# Patient Record
Sex: MALE | Race: WHITE | Employment: FULL TIME | URBAN - METROPOLITAN AREA
[De-identification: names, ages, dates, MRNs, and addresses within clinical notes are randomized per-mention and may not be internally consistent; named-entity substitution may affect disease eponyms.]

---

## 2019-02-04 ENCOUNTER — PATIENT OUTREACH (OUTPATIENT)
Dept: FAMILY MEDICINE CLINIC | Facility: CLINIC | Age: 49
End: 2019-02-04

## 2019-02-04 DIAGNOSIS — Z12.11 SCREEN FOR COLON CANCER: Primary | ICD-10-CM

## 2019-02-04 NOTE — PROGRESS NOTES
Please call pt to inquire if pt has had a colonoscopy in the last 10 years and if so who performed it (name and phone #). Please let [facility] know so I can obtain the report.     If pt did not have a colonoscopy please advise them we will leave the FIT stoo

## 2019-09-09 ENCOUNTER — TELEPHONE (OUTPATIENT)
Dept: FAMILY MEDICINE CLINIC | Facility: CLINIC | Age: 49
End: 2019-09-09

## 2019-12-01 ENCOUNTER — HOSPITAL ENCOUNTER (EMERGENCY)
Facility: HOSPITAL | Age: 49
Discharge: HOME | End: 2019-12-01
Attending: EMERGENCY MEDICINE
Payer: COMMERCIAL

## 2019-12-01 ENCOUNTER — APPOINTMENT (OUTPATIENT)
Dept: GENERAL RADIOLOGY | Facility: HOSPITAL | Age: 49
End: 2019-12-01
Payer: COMMERCIAL

## 2019-12-01 VITALS
WEIGHT: 152 LBS | TEMPERATURE: 98 F | DIASTOLIC BLOOD PRESSURE: 82 MMHG | SYSTOLIC BLOOD PRESSURE: 133 MMHG | RESPIRATION RATE: 16 BRPM | BODY MASS INDEX: 23.86 KG/M2 | HEIGHT: 67 IN | OXYGEN SATURATION: 96 % | HEART RATE: 63 BPM

## 2019-12-01 DIAGNOSIS — R07.9 CHEST PAIN WITH LOW RISK FOR CARDIAC ETIOLOGY: Primary | ICD-10-CM

## 2019-12-01 PROCEDURE — 93010 ELECTROCARDIOGRAM REPORT: CPT

## 2019-12-01 PROCEDURE — 85025 COMPLETE CBC W/AUTO DIFF WBC: CPT

## 2019-12-01 PROCEDURE — 80053 COMPREHEN METABOLIC PANEL: CPT

## 2019-12-01 PROCEDURE — 84484 ASSAY OF TROPONIN QUANT: CPT

## 2019-12-01 PROCEDURE — 80053 COMPREHEN METABOLIC PANEL: CPT | Performed by: EMERGENCY MEDICINE

## 2019-12-01 PROCEDURE — 36415 COLL VENOUS BLD VENIPUNCTURE: CPT

## 2019-12-01 PROCEDURE — 93005 ELECTROCARDIOGRAM TRACING: CPT

## 2019-12-01 PROCEDURE — 71046 X-RAY EXAM CHEST 2 VIEWS: CPT

## 2019-12-01 PROCEDURE — 99285 EMERGENCY DEPT VISIT HI MDM: CPT

## 2019-12-01 PROCEDURE — 85025 COMPLETE CBC W/AUTO DIFF WBC: CPT | Performed by: EMERGENCY MEDICINE

## 2019-12-01 PROCEDURE — 99284 EMERGENCY DEPT VISIT MOD MDM: CPT

## 2019-12-01 PROCEDURE — 84484 ASSAY OF TROPONIN QUANT: CPT | Performed by: EMERGENCY MEDICINE

## 2019-12-01 RX ORDER — CHOLECALCIFEROL (VITAMIN D3) 125 MCG
CAPSULE ORAL
COMMUNITY

## 2019-12-02 ENCOUNTER — TELEPHONE (OUTPATIENT)
Dept: FAMILY MEDICINE CLINIC | Facility: CLINIC | Age: 49
End: 2019-12-02

## 2019-12-02 NOTE — ED INITIAL ASSESSMENT (HPI)
A/O x 4. Patient presents with chest pain and dizziness that began Wednesday. Patient describes the pain as midsternal chest burning. Does not radiate. Denies any nausea or shortness of breath. States that the pain at this point feels like a tightness.

## 2019-12-02 NOTE — TELEPHONE ENCOUNTER
Patient was seen at ER yesterday. Was told to call office to have Dr. Rachelle Chu order a Stress Test.  Verified Phone number on file. Patient asking for a phone call when in system. Please advise. Thank you.

## 2019-12-02 NOTE — ED PROVIDER NOTES
Patient Seen in: [facility] Emergency Department      History   Patient presents with:  Chest Pain Angina (cardiovascular)  Dizziness (neurologic)    Stated Complaint: Chest pain, dizziness    HPI  20-year-old male no significant past medical histor dizziness  Other systems are as noted in HPI. Constitutional and vital signs reviewed. All other systems reviewed and negative except as noted above.     Physical Exam     ED Triage Vitals [12/01/19 2038]   /89   Pulse 75   Resp 18   Temp 98.1 ° ------                     CBC W/ DIFFERENTIAL[[id number]]                              Final result                 Please view results for these tests on the individual orders.    RAINBOW DRAW BLUE   RAINBOW DRAW LAVENDER   RAINBOW DRAW LIGHT GREEN improved condition.               Disposition and Plan     Clinical Impression:  Chest pain with low risk for cardiac etiology  (primary encounter diagnosis)    Disposition:  Discharge  12/1/2019 11:32 pm    Follow-up:  Janay Hines

## 2019-12-03 ENCOUNTER — OFFICE VISIT (OUTPATIENT)
Dept: FAMILY MEDICINE CLINIC | Facility: CLINIC | Age: 49
End: 2019-12-03

## 2019-12-03 VITALS
TEMPERATURE: 98 F | BODY MASS INDEX: 24.82 KG/M2 | DIASTOLIC BLOOD PRESSURE: 80 MMHG | OXYGEN SATURATION: 96 % | HEIGHT: 67.5 IN | HEART RATE: 68 BPM | WEIGHT: 160 LBS | RESPIRATION RATE: 16 BRPM | SYSTOLIC BLOOD PRESSURE: 122 MMHG

## 2019-12-03 DIAGNOSIS — R07.2 SUBSTERNAL CHEST PAIN: Primary | ICD-10-CM

## 2019-12-03 DIAGNOSIS — Z23 NEED FOR VACCINATION: ICD-10-CM

## 2019-12-03 PROCEDURE — 99203 OFFICE O/P NEW LOW 30 MIN: CPT | Performed by: FAMILY MEDICINE

## 2019-12-03 PROCEDURE — 90471 IMMUNIZATION ADMIN: CPT | Performed by: FAMILY MEDICINE

## 2019-12-03 PROCEDURE — 90686 IIV4 VACC NO PRSV 0.5 ML IM: CPT | Performed by: FAMILY MEDICINE

## 2019-12-03 NOTE — PROGRESS NOTES
[address] [facility] Family Medicine Office Note  Chief Complaint:   Patient presents with:  Referral: stress test  Imm/Inj: flu vaccine      HPI:   This is a 44year old male coming in for ER follow-up for chest pressure.   Patient was having chest pressur Brother    • Lipids Brother      Allergies:    Seasonal                  Current Meds:  Current Outpatient Medications   Medication Sig Dispense Refill   • Lactobacillus (PROBIOTIC ACIDOPHILUS) Oral Cap Take by mouth.      • Multiple Vitamins-Minerals (MULT CN 2 - 12 grossly intact     ASSESSMENT AND PLAN:   1. Substernal chest pain  -  ER workup negative  -  Symptoms resolved at this point  -  Check stress test  -  Check TSH and lipid panel  -  Recommend heart scan  -  Further recs once testing and labs are

## 2019-12-10 ENCOUNTER — HOSPITAL ENCOUNTER (OUTPATIENT)
Dept: CV DIAGNOSTICS | Facility: HOSPITAL | Age: 49
Discharge: HOME | End: 2019-12-10
Attending: FAMILY MEDICINE
Payer: COMMERCIAL

## 2019-12-10 DIAGNOSIS — R07.2 SUBSTERNAL CHEST PAIN: ICD-10-CM

## 2019-12-10 PROCEDURE — 93018 CV STRESS TEST I&R ONLY: CPT | Performed by: FAMILY MEDICINE

## 2019-12-10 PROCEDURE — 93017 CV STRESS TEST TRACING ONLY: CPT | Performed by: FAMILY MEDICINE

## 2020-01-11 ENCOUNTER — APPOINTMENT (OUTPATIENT)
Dept: LAB | Facility: HOSPITAL | Age: 50
End: 2020-01-11
Attending: FAMILY MEDICINE
Payer: COMMERCIAL

## 2020-01-11 DIAGNOSIS — R07.2 SUBSTERNAL CHEST PAIN: ICD-10-CM

## 2020-01-11 LAB
CHOLEST SMN-MCNC: 203 MG/DL (ref ?–200)
HDLC SERPL-MCNC: 103 MG/DL (ref 40–59)
LDLC SERPL CALC-MCNC: 93 MG/DL (ref ?–100)
NONHDLC SERPL-MCNC: 100 MG/DL (ref ?–130)
PATIENT FASTING Y/N/NP: YES
TRIGL SERPL-MCNC: 36 MG/DL (ref 30–149)
TSI SER-ACNC: 1.35 MIU/ML (ref 0.36–3.74)
VLDLC SERPL CALC-MCNC: 7 MG/DL (ref 0–30)

## 2020-01-11 PROCEDURE — 36415 COLL VENOUS BLD VENIPUNCTURE: CPT

## 2020-01-11 PROCEDURE — 80061 LIPID PANEL: CPT

## 2020-01-11 PROCEDURE — 84443 ASSAY THYROID STIM HORMONE: CPT

## 2020-02-02 ENCOUNTER — TELEPHONE (OUTPATIENT)
Dept: FAMILY MEDICINE CLINIC | Facility: CLINIC | Age: 50
End: 2020-02-02

## 2020-02-02 NOTE — TELEPHONE ENCOUNTER
Please call patient to ask him to schedule physical with Dr. Shannan Chan.   He is due for colorectal cancer screening and should discuss this with Dr. Shannan Chan at physical.

## 2020-02-10 NOTE — TELEPHONE ENCOUNTER
Left voicemail for patient to call back to schedule this appointment. Sent unable to reach letter via [employer].

## 2020-11-06 ENCOUNTER — MED REC SCAN ONLY (OUTPATIENT)
Dept: FAMILY MEDICINE CLINIC | Facility: CLINIC | Age: 50
End: 2020-11-06

## 2020-12-07 ENCOUNTER — PATIENT OUTREACH (OUTPATIENT)
Dept: FAMILY MEDICINE CLINIC | Facility: CLINIC | Age: 50
End: 2020-12-07

## 2020-12-07 NOTE — PROGRESS NOTES
Please call patient to ask him to schedule physical with Dr. Michelle Rosales.   He is due for colorectal cancer screening and should discuss this with Dr. Michelle Rosales at physical.

## 2021-08-17 ENCOUNTER — TELEPHONE (OUTPATIENT)
Dept: FAMILY MEDICINE CLINIC | Facility: CLINIC | Age: 51
End: 2021-08-17

## 2021-10-28 ENCOUNTER — OFFICE VISIT (OUTPATIENT)
Dept: FAMILY MEDICINE CLINIC | Facility: CLINIC | Age: 51
End: 2021-10-28

## 2021-10-28 VITALS
WEIGHT: 160 LBS | RESPIRATION RATE: 16 BRPM | TEMPERATURE: 98 F | SYSTOLIC BLOOD PRESSURE: 122 MMHG | DIASTOLIC BLOOD PRESSURE: 68 MMHG | HEIGHT: 65.25 IN | BODY MASS INDEX: 26.34 KG/M2 | HEART RATE: 68 BPM

## 2021-10-28 DIAGNOSIS — Z12.11 COLON CANCER SCREENING: ICD-10-CM

## 2021-10-28 DIAGNOSIS — Z12.83 SKIN CANCER SCREENING: ICD-10-CM

## 2021-10-28 DIAGNOSIS — Z00.00 LABORATORY EXAMINATION ORDERED AS PART OF A ROUTINE GENERAL MEDICAL EXAMINATION: Primary | ICD-10-CM

## 2021-10-28 PROCEDURE — 3008F BODY MASS INDEX DOCD: CPT | Performed by: FAMILY MEDICINE

## 2021-10-28 PROCEDURE — 3074F SYST BP LT 130 MM HG: CPT | Performed by: FAMILY MEDICINE

## 2021-10-28 PROCEDURE — 3078F DIAST BP <80 MM HG: CPT | Performed by: FAMILY MEDICINE

## 2021-10-28 PROCEDURE — G0438 PPPS, INITIAL VISIT: HCPCS | Performed by: FAMILY MEDICINE

## 2021-10-28 PROCEDURE — 99396 PREV VISIT EST AGE 40-64: CPT | Performed by: FAMILY MEDICINE

## 2021-10-28 RX ORDER — B-COMPLEX WITH VITAMIN C
TABLET ORAL
COMMUNITY
Start: 2020-06-01

## 2021-10-28 RX ORDER — VITAMIN B COMPLEX
TABLET ORAL
COMMUNITY
Start: 2020-06-01

## 2021-10-28 NOTE — PROGRESS NOTES
Anu Flores is a 46year old male who presents for a complete physical exam.   HPI:   Pt complains of nothing today. States he is doing well. Denies any recent illnesses or hospitalizations. Tdap up to date.   Denies any family history of prostat palsy   • Heart Disorder Maternal Grandmother    • Hypertension Maternal Grandmother    • Cancer Maternal Grandfather         bone cancer   • Other (Other[other]) Paternal Grandmother         emphysema   • Hypertension Brother    • Lipids Brother       Soc murmur  GI: good BS's,no masses, HSM or tenderness  :  two descended testes,no masses,no hernia,no penile lesions  RECTAL: good rectal tone, prostate shows no masses, stool is OB negative  MUSCULOSKELETAL: back is not tender,FROM of the back  EXTREMITIES

## 2021-11-06 ENCOUNTER — LAB ENCOUNTER (OUTPATIENT)
Dept: LAB | Facility: HOSPITAL | Age: 51
End: 2021-11-06
Attending: FAMILY MEDICINE
Payer: COMMERCIAL

## 2021-11-06 DIAGNOSIS — Z00.00 LABORATORY EXAMINATION ORDERED AS PART OF A ROUTINE GENERAL MEDICAL EXAMINATION: ICD-10-CM

## 2021-11-06 PROCEDURE — 36415 COLL VENOUS BLD VENIPUNCTURE: CPT

## 2021-11-06 PROCEDURE — 81003 URINALYSIS AUTO W/O SCOPE: CPT

## 2021-11-06 PROCEDURE — 80053 COMPREHEN METABOLIC PANEL: CPT

## 2021-11-06 PROCEDURE — 80061 LIPID PANEL: CPT

## 2021-11-06 PROCEDURE — 85025 COMPLETE CBC W/AUTO DIFF WBC: CPT

## 2021-11-06 PROCEDURE — 84443 ASSAY THYROID STIM HORMONE: CPT

## 2021-11-08 DIAGNOSIS — R73.09 ELEVATED GLUCOSE: ICD-10-CM

## 2021-11-08 DIAGNOSIS — E78.5 HYPERLIPIDEMIA, UNSPECIFIED HYPERLIPIDEMIA TYPE: Primary | ICD-10-CM

## 2022-01-28 ENCOUNTER — LAB ENCOUNTER (OUTPATIENT)
Dept: LAB | Facility: HOSPITAL | Age: 52
End: 2022-01-28
Attending: INTERNAL MEDICINE
Payer: COMMERCIAL

## 2022-01-28 DIAGNOSIS — Z01.818 PRE-OP TESTING: ICD-10-CM

## 2022-01-28 LAB — SARS-COV-2 RNA RESP QL NAA+PROBE: NOT DETECTED

## 2022-01-31 PROBLEM — Z12.11 SPECIAL SCREENING FOR MALIGNANT NEOPLASM OF COLON: Status: ACTIVE | Noted: 2022-01-31

## 2022-01-31 PROBLEM — D12.0 BENIGN NEOPLASM OF CECUM: Status: ACTIVE | Noted: 2022-01-31

## 2022-01-31 PROBLEM — Z83.719 FAMILY HISTORY OF COLONIC POLYPS: Status: ACTIVE | Noted: 2022-01-31

## 2022-01-31 PROBLEM — D12.2 BENIGN NEOPLASM OF ASCENDING COLON: Status: ACTIVE | Noted: 2022-01-31

## 2022-01-31 PROBLEM — Z83.71 FAMILY HISTORY OF COLONIC POLYPS: Status: ACTIVE | Noted: 2022-01-31

## 2022-01-31 PROCEDURE — 88305 TISSUE EXAM BY PATHOLOGIST: CPT | Performed by: INTERNAL MEDICINE

## 2022-05-14 ENCOUNTER — LAB ENCOUNTER (OUTPATIENT)
Dept: LAB | Facility: HOSPITAL | Age: 52
End: 2022-05-14
Attending: FAMILY MEDICINE
Payer: COMMERCIAL

## 2022-05-14 DIAGNOSIS — R73.09 ELEVATED GLUCOSE: ICD-10-CM

## 2022-05-14 DIAGNOSIS — E78.5 HYPERLIPIDEMIA, UNSPECIFIED HYPERLIPIDEMIA TYPE: ICD-10-CM

## 2022-05-14 LAB
ALBUMIN SERPL-MCNC: 3.9 G/DL (ref 3.4–5)
ALBUMIN/GLOB SERPL: 1.2 {RATIO} (ref 1–2)
ALP LIVER SERPL-CCNC: 74 U/L
ALT SERPL-CCNC: 37 U/L
ANION GAP SERPL CALC-SCNC: 6 MMOL/L (ref 0–18)
AST SERPL-CCNC: 31 U/L (ref 15–37)
BILIRUB SERPL-MCNC: 0.6 MG/DL (ref 0.1–2)
BUN BLD-MCNC: 24 MG/DL (ref 7–18)
CALCIUM BLD-MCNC: 9.4 MG/DL (ref 8.5–10.1)
CHLORIDE SERPL-SCNC: 103 MMOL/L (ref 98–112)
CHOLEST SERPL-MCNC: 193 MG/DL (ref ?–200)
CO2 SERPL-SCNC: 27 MMOL/L (ref 21–32)
CREAT BLD-MCNC: 1.2 MG/DL
EST. AVERAGE GLUCOSE BLD GHB EST-MCNC: 120 MG/DL (ref 68–126)
FASTING PATIENT LIPID ANSWER: YES
FASTING STATUS PATIENT QL REPORTED: YES
GLOBULIN PLAS-MCNC: 3.2 G/DL (ref 2.8–4.4)
GLUCOSE BLD-MCNC: 109 MG/DL (ref 70–99)
HBA1C MFR BLD: 5.8 % (ref ?–5.7)
HDLC SERPL-MCNC: 95 MG/DL (ref 40–59)
LDLC SERPL CALC-MCNC: 90 MG/DL (ref ?–100)
NONHDLC SERPL-MCNC: 98 MG/DL (ref ?–130)
OSMOLALITY SERPL CALC.SUM OF ELEC: 287 MOSM/KG (ref 275–295)
POTASSIUM SERPL-SCNC: 4 MMOL/L (ref 3.5–5.1)
PROT SERPL-MCNC: 7.1 G/DL (ref 6.4–8.2)
SODIUM SERPL-SCNC: 136 MMOL/L (ref 136–145)
TRIGL SERPL-MCNC: 41 MG/DL (ref 30–149)
VLDLC SERPL CALC-MCNC: 7 MG/DL (ref 0–30)

## 2022-05-14 PROCEDURE — 83036 HEMOGLOBIN GLYCOSYLATED A1C: CPT

## 2022-05-14 PROCEDURE — 80053 COMPREHEN METABOLIC PANEL: CPT

## 2022-05-14 PROCEDURE — 36415 COLL VENOUS BLD VENIPUNCTURE: CPT

## 2022-05-14 PROCEDURE — 80061 LIPID PANEL: CPT

## 2023-07-13 ENCOUNTER — OFFICE VISIT (OUTPATIENT)
Dept: FAMILY MEDICINE CLINIC | Facility: CLINIC | Age: 53
End: 2023-07-13
Payer: COMMERCIAL

## 2023-07-13 ENCOUNTER — LAB ENCOUNTER (OUTPATIENT)
Dept: LAB | Age: 53
End: 2023-07-13
Attending: FAMILY MEDICINE
Payer: COMMERCIAL

## 2023-07-13 VITALS
HEIGHT: 65 IN | HEART RATE: 60 BPM | RESPIRATION RATE: 14 BRPM | BODY MASS INDEX: 25.83 KG/M2 | DIASTOLIC BLOOD PRESSURE: 78 MMHG | TEMPERATURE: 97 F | WEIGHT: 155 LBS | SYSTOLIC BLOOD PRESSURE: 122 MMHG

## 2023-07-13 DIAGNOSIS — R73.03 PREDIABETES: ICD-10-CM

## 2023-07-13 DIAGNOSIS — Z00.00 LABORATORY EXAM ORDERED AS PART OF ROUTINE GENERAL MEDICAL EXAMINATION: ICD-10-CM

## 2023-07-13 DIAGNOSIS — Z00.00 LABORATORY EXAM ORDERED AS PART OF ROUTINE GENERAL MEDICAL EXAMINATION: Primary | ICD-10-CM

## 2023-07-13 LAB
ALBUMIN SERPL-MCNC: 4 G/DL (ref 3.4–5)
ALBUMIN/GLOB SERPL: 1.2 {RATIO} (ref 1–2)
ALP LIVER SERPL-CCNC: 76 U/L
ALT SERPL-CCNC: 40 U/L
ANION GAP SERPL CALC-SCNC: 7 MMOL/L (ref 0–18)
AST SERPL-CCNC: 26 U/L (ref 15–37)
BASOPHILS # BLD AUTO: 0.03 X10(3) UL (ref 0–0.2)
BASOPHILS NFR BLD AUTO: 0.6 %
BILIRUB SERPL-MCNC: 0.6 MG/DL (ref 0.1–2)
BILIRUB UR QL STRIP.AUTO: NEGATIVE
BUN BLD-MCNC: 22 MG/DL (ref 7–18)
CALCIUM BLD-MCNC: 9.7 MG/DL (ref 8.5–10.1)
CHLORIDE SERPL-SCNC: 103 MMOL/L (ref 98–112)
CHOLEST SERPL-MCNC: 204 MG/DL (ref ?–200)
CLARITY UR REFRACT.AUTO: CLEAR
CO2 SERPL-SCNC: 28 MMOL/L (ref 21–32)
COMPLEXED PSA SERPL-MCNC: 2.87 NG/ML (ref ?–4)
CREAT BLD-MCNC: 1.14 MG/DL
EOSINOPHIL # BLD AUTO: 0.11 X10(3) UL (ref 0–0.7)
EOSINOPHIL NFR BLD AUTO: 2.2 %
ERYTHROCYTE [DISTWIDTH] IN BLOOD BY AUTOMATED COUNT: 13 %
EST. AVERAGE GLUCOSE BLD GHB EST-MCNC: 114 MG/DL (ref 68–126)
FASTING PATIENT LIPID ANSWER: NO
FASTING STATUS PATIENT QL REPORTED: NO
GFR SERPLBLD BASED ON 1.73 SQ M-ARVRAT: 76 ML/MIN/1.73M2 (ref 60–?)
GLOBULIN PLAS-MCNC: 3.3 G/DL (ref 2.8–4.4)
GLUCOSE BLD-MCNC: 104 MG/DL (ref 70–99)
GLUCOSE UR STRIP.AUTO-MCNC: NEGATIVE MG/DL
HBA1C MFR BLD: 5.6 % (ref ?–5.7)
HCT VFR BLD AUTO: 50.4 %
HDLC SERPL-MCNC: 101 MG/DL (ref 40–59)
HGB BLD-MCNC: 16.3 G/DL
IMM GRANULOCYTES # BLD AUTO: 0.02 X10(3) UL (ref 0–1)
IMM GRANULOCYTES NFR BLD: 0.4 %
KETONES UR STRIP.AUTO-MCNC: NEGATIVE MG/DL
LDLC SERPL CALC-MCNC: 94 MG/DL (ref ?–100)
LEUKOCYTE ESTERASE UR QL STRIP.AUTO: NEGATIVE
LYMPHOCYTES # BLD AUTO: 1.52 X10(3) UL (ref 1–4)
LYMPHOCYTES NFR BLD AUTO: 30.4 %
MCH RBC QN AUTO: 29 PG (ref 26–34)
MCHC RBC AUTO-ENTMCNC: 32.3 G/DL (ref 31–37)
MCV RBC AUTO: 89.7 FL
MONOCYTES # BLD AUTO: 0.43 X10(3) UL (ref 0.1–1)
MONOCYTES NFR BLD AUTO: 8.6 %
NEUTROPHILS # BLD AUTO: 2.89 X10 (3) UL (ref 1.5–7.7)
NEUTROPHILS # BLD AUTO: 2.89 X10(3) UL (ref 1.5–7.7)
NEUTROPHILS NFR BLD AUTO: 57.8 %
NITRITE UR QL STRIP.AUTO: NEGATIVE
NONHDLC SERPL-MCNC: 103 MG/DL (ref ?–130)
OSMOLALITY SERPL CALC.SUM OF ELEC: 290 MOSM/KG (ref 275–295)
PH UR STRIP.AUTO: 7 [PH] (ref 5–8)
PLATELET # BLD AUTO: 174 10(3)UL (ref 150–450)
POTASSIUM SERPL-SCNC: 3.9 MMOL/L (ref 3.5–5.1)
PROT SERPL-MCNC: 7.3 G/DL (ref 6.4–8.2)
PROT UR STRIP.AUTO-MCNC: NEGATIVE MG/DL
RBC # BLD AUTO: 5.62 X10(6)UL
RBC UR QL AUTO: NEGATIVE
SODIUM SERPL-SCNC: 138 MMOL/L (ref 136–145)
SP GR UR STRIP.AUTO: 1.01 (ref 1–1.03)
TRIGL SERPL-MCNC: 50 MG/DL (ref 30–149)
TSI SER-ACNC: 1.08 MIU/ML (ref 0.36–3.74)
UROBILINOGEN UR STRIP.AUTO-MCNC: <2 MG/DL
VLDLC SERPL CALC-MCNC: 8 MG/DL (ref 0–30)
WBC # BLD AUTO: 5 X10(3) UL (ref 4–11)

## 2023-07-13 PROCEDURE — 80061 LIPID PANEL: CPT

## 2023-07-13 PROCEDURE — G0438 PPPS, INITIAL VISIT: HCPCS | Performed by: FAMILY MEDICINE

## 2023-07-13 PROCEDURE — 81003 URINALYSIS AUTO W/O SCOPE: CPT

## 2023-07-13 PROCEDURE — 3008F BODY MASS INDEX DOCD: CPT | Performed by: FAMILY MEDICINE

## 2023-07-13 PROCEDURE — 99396 PREV VISIT EST AGE 40-64: CPT | Performed by: FAMILY MEDICINE

## 2023-07-13 PROCEDURE — 80053 COMPREHEN METABOLIC PANEL: CPT

## 2023-07-13 PROCEDURE — 84443 ASSAY THYROID STIM HORMONE: CPT

## 2023-07-13 PROCEDURE — 3078F DIAST BP <80 MM HG: CPT | Performed by: FAMILY MEDICINE

## 2023-07-13 PROCEDURE — 83036 HEMOGLOBIN GLYCOSYLATED A1C: CPT

## 2023-07-13 PROCEDURE — 85025 COMPLETE CBC W/AUTO DIFF WBC: CPT

## 2023-07-13 PROCEDURE — 3074F SYST BP LT 130 MM HG: CPT | Performed by: FAMILY MEDICINE

## 2024-03-03 ENCOUNTER — HOSPITAL ENCOUNTER (INPATIENT)
Facility: HOSPITAL | Age: 54
LOS: 4 days | Discharge: HOME | End: 2024-03-07
Attending: EMERGENCY MEDICINE | Admitting: HOSPITALIST
Payer: COMMERCIAL

## 2024-03-03 ENCOUNTER — APPOINTMENT (OUTPATIENT)
Dept: CT IMAGING | Facility: HOSPITAL | Age: 54
End: 2024-03-03
Attending: EMERGENCY MEDICINE
Payer: COMMERCIAL

## 2024-03-03 ENCOUNTER — APPOINTMENT (OUTPATIENT)
Dept: ULTRASOUND IMAGING | Facility: HOSPITAL | Age: 54
End: 2024-03-03
Attending: EMERGENCY MEDICINE
Payer: COMMERCIAL

## 2024-03-03 ENCOUNTER — HOSPITAL ENCOUNTER (OUTPATIENT)
Age: 54
Discharge: ANOTHER HOSPITAL | End: 2024-03-03
Payer: COMMERCIAL

## 2024-03-03 VITALS
OXYGEN SATURATION: 98 % | SYSTOLIC BLOOD PRESSURE: 136 MMHG | HEIGHT: 66 IN | TEMPERATURE: 98 F | BODY MASS INDEX: 24.59 KG/M2 | DIASTOLIC BLOOD PRESSURE: 73 MMHG | RESPIRATION RATE: 18 BRPM | HEART RATE: 84 BPM | WEIGHT: 153 LBS

## 2024-03-03 DIAGNOSIS — R10.10 UPPER ABDOMINAL PAIN: Primary | ICD-10-CM

## 2024-03-03 DIAGNOSIS — K85.90 ACUTE PANCREATITIS, UNSPECIFIED COMPLICATION STATUS, UNSPECIFIED PANCREATITIS TYPE (HCC): Primary | ICD-10-CM

## 2024-03-03 DIAGNOSIS — R10.9 ABDOMINAL PAIN, ACUTE: ICD-10-CM

## 2024-03-03 DIAGNOSIS — K83.8 DILATED CBD, ACQUIRED: ICD-10-CM

## 2024-03-03 DIAGNOSIS — D12.2 BENIGN NEOPLASM OF ASCENDING COLON: ICD-10-CM

## 2024-03-03 DIAGNOSIS — R74.8 ELEVATED LIVER ENZYMES: ICD-10-CM

## 2024-03-03 LAB
ALBUMIN SERPL-MCNC: 3.9 G/DL (ref 3.4–5)
ALBUMIN/GLOB SERPL: 1.1 {RATIO} (ref 1–2)
ALP LIVER SERPL-CCNC: 183 U/L
ALT SERPL-CCNC: 1969 U/L
ANION GAP SERPL CALC-SCNC: 2 MMOL/L (ref 0–18)
AST SERPL-CCNC: 2280 U/L (ref 15–37)
BASOPHILS # BLD AUTO: 0.03 X10(3) UL (ref 0–0.2)
BASOPHILS NFR BLD AUTO: 0.5 %
BILIRUB SERPL-MCNC: 1.8 MG/DL (ref 0.1–2)
BILIRUB UR QL STRIP: NEGATIVE
BUN BLD-MCNC: 20 MG/DL (ref 9–23)
CALCIUM BLD-MCNC: 10.1 MG/DL (ref 8.5–10.1)
CHLORIDE SERPL-SCNC: 103 MMOL/L (ref 98–112)
CLARITY UR: CLEAR
CO2 SERPL-SCNC: 31 MMOL/L (ref 21–32)
COLOR UR: YELLOW
CREAT BLD-MCNC: 1.09 MG/DL
EGFRCR SERPLBLD CKD-EPI 2021: 80 ML/MIN/1.73M2 (ref 60–?)
EOSINOPHIL # BLD AUTO: 0.13 X10(3) UL (ref 0–0.7)
EOSINOPHIL NFR BLD AUTO: 2 %
ERYTHROCYTE [DISTWIDTH] IN BLOOD BY AUTOMATED COUNT: 12.7 %
GLOBULIN PLAS-MCNC: 3.4 G/DL (ref 2.8–4.4)
GLUCOSE BLD-MCNC: 113 MG/DL (ref 70–99)
GLUCOSE UR STRIP-MCNC: NEGATIVE MG/DL
HCT VFR BLD AUTO: 47.1 %
HGB BLD-MCNC: 16.3 G/DL
HGB UR QL STRIP: NEGATIVE
IMM GRANULOCYTES # BLD AUTO: 0.01 X10(3) UL (ref 0–1)
IMM GRANULOCYTES NFR BLD: 0.2 %
KETONES UR STRIP-MCNC: NEGATIVE MG/DL
LEUKOCYTE ESTERASE UR QL STRIP: NEGATIVE
LIPASE SERPL-CCNC: 3677 U/L (ref 13–75)
LYMPHOCYTES # BLD AUTO: 0.93 X10(3) UL (ref 1–4)
LYMPHOCYTES NFR BLD AUTO: 14.4 %
MCH RBC QN AUTO: 29.7 PG (ref 26–34)
MCHC RBC AUTO-ENTMCNC: 34.6 G/DL (ref 31–37)
MCV RBC AUTO: 85.8 FL
MONOCYTES # BLD AUTO: 0.73 X10(3) UL (ref 0.1–1)
MONOCYTES NFR BLD AUTO: 11.3 %
NEUTROPHILS # BLD AUTO: 4.64 X10 (3) UL (ref 1.5–7.7)
NEUTROPHILS # BLD AUTO: 4.64 X10(3) UL (ref 1.5–7.7)
NEUTROPHILS NFR BLD AUTO: 71.6 %
NITRITE UR QL STRIP: NEGATIVE
OSMOLALITY SERPL CALC.SUM OF ELEC: 285 MOSM/KG (ref 275–295)
PH UR STRIP: 7.5 [PH]
PLATELET # BLD AUTO: 218 10(3)UL (ref 150–450)
POTASSIUM SERPL-SCNC: 3.6 MMOL/L (ref 3.5–5.1)
PROT SERPL-MCNC: 7.3 G/DL (ref 6.4–8.2)
PROT UR STRIP-MCNC: NEGATIVE MG/DL
RBC # BLD AUTO: 5.49 X10(6)UL
SODIUM SERPL-SCNC: 136 MMOL/L (ref 136–145)
SP GR UR STRIP: 1.01
UROBILINOGEN UR STRIP-ACNC: <2 MG/DL
WBC # BLD AUTO: 6.5 X10(3) UL (ref 4–11)

## 2024-03-03 PROCEDURE — 99223 1ST HOSP IP/OBS HIGH 75: CPT | Performed by: HOSPITALIST

## 2024-03-03 PROCEDURE — 74177 CT ABD & PELVIS W/CONTRAST: CPT | Performed by: EMERGENCY MEDICINE

## 2024-03-03 PROCEDURE — 76700 US EXAM ABDOM COMPLETE: CPT | Performed by: EMERGENCY MEDICINE

## 2024-03-03 RX ORDER — ENOXAPARIN SODIUM 100 MG/ML
40 INJECTION SUBCUTANEOUS DAILY
Status: DISCONTINUED | OUTPATIENT
Start: 2024-03-04 | End: 2024-03-07

## 2024-03-03 RX ORDER — ONDANSETRON 2 MG/ML
4 INJECTION INTRAMUSCULAR; INTRAVENOUS EVERY 6 HOURS PRN
Status: DISCONTINUED | OUTPATIENT
Start: 2024-03-03 | End: 2024-03-07

## 2024-03-03 RX ORDER — HYDROMORPHONE HYDROCHLORIDE 1 MG/ML
0.4 INJECTION, SOLUTION INTRAMUSCULAR; INTRAVENOUS; SUBCUTANEOUS EVERY 2 HOUR PRN
Status: DISCONTINUED | OUTPATIENT
Start: 2024-03-03 | End: 2024-03-07

## 2024-03-03 RX ORDER — BISACODYL 10 MG
10 SUPPOSITORY, RECTAL RECTAL
Status: DISCONTINUED | OUTPATIENT
Start: 2024-03-03 | End: 2024-03-07

## 2024-03-03 RX ORDER — HYDROMORPHONE HYDROCHLORIDE 1 MG/ML
0.5 INJECTION, SOLUTION INTRAMUSCULAR; INTRAVENOUS; SUBCUTANEOUS ONCE
Status: COMPLETED | OUTPATIENT
Start: 2024-03-03 | End: 2024-03-03

## 2024-03-03 RX ORDER — MELATONIN
3 NIGHTLY PRN
Status: DISCONTINUED | OUTPATIENT
Start: 2024-03-03 | End: 2024-03-07

## 2024-03-03 RX ORDER — HYDROMORPHONE HYDROCHLORIDE 1 MG/ML
0.2 INJECTION, SOLUTION INTRAMUSCULAR; INTRAVENOUS; SUBCUTANEOUS EVERY 2 HOUR PRN
Status: DISCONTINUED | OUTPATIENT
Start: 2024-03-03 | End: 2024-03-07

## 2024-03-03 RX ORDER — SODIUM CHLORIDE, SODIUM LACTATE, POTASSIUM CHLORIDE, CALCIUM CHLORIDE 600; 310; 30; 20 MG/100ML; MG/100ML; MG/100ML; MG/100ML
INJECTION, SOLUTION INTRAVENOUS CONTINUOUS
Status: DISCONTINUED | OUTPATIENT
Start: 2024-03-03 | End: 2024-03-06

## 2024-03-03 RX ORDER — ENEMA 19; 7 G/133ML; G/133ML
1 ENEMA RECTAL ONCE AS NEEDED
Status: DISCONTINUED | OUTPATIENT
Start: 2024-03-03 | End: 2024-03-07

## 2024-03-03 RX ORDER — HYDROMORPHONE HYDROCHLORIDE 1 MG/ML
0.8 INJECTION, SOLUTION INTRAMUSCULAR; INTRAVENOUS; SUBCUTANEOUS EVERY 2 HOUR PRN
Status: DISCONTINUED | OUTPATIENT
Start: 2024-03-03 | End: 2024-03-07

## 2024-03-03 RX ORDER — PROCHLORPERAZINE EDISYLATE 5 MG/ML
5 INJECTION INTRAMUSCULAR; INTRAVENOUS EVERY 8 HOURS PRN
Status: DISCONTINUED | OUTPATIENT
Start: 2024-03-03 | End: 2024-03-07

## 2024-03-03 RX ORDER — POLYETHYLENE GLYCOL 3350 17 G/17G
17 POWDER, FOR SOLUTION ORAL DAILY PRN
Status: DISCONTINUED | OUTPATIENT
Start: 2024-03-03 | End: 2024-03-07

## 2024-03-03 RX ORDER — ACETAMINOPHEN 500 MG
1000 TABLET ORAL EVERY 4 HOURS PRN
Status: DISCONTINUED | OUTPATIENT
Start: 2024-03-03 | End: 2024-03-07

## 2024-03-03 RX ORDER — SENNOSIDES 8.6 MG
17.2 TABLET ORAL NIGHTLY PRN
Status: DISCONTINUED | OUTPATIENT
Start: 2024-03-03 | End: 2024-03-07

## 2024-03-03 RX ORDER — ECHINACEA PURPUREA EXTRACT 125 MG
1 TABLET ORAL
Status: DISCONTINUED | OUTPATIENT
Start: 2024-03-03 | End: 2024-03-07

## 2024-03-03 RX ORDER — SODIUM CHLORIDE 9 MG/ML
INJECTION, SOLUTION INTRAVENOUS CONTINUOUS
Status: DISCONTINUED | OUTPATIENT
Start: 2024-03-03 | End: 2024-03-03

## 2024-03-03 RX ORDER — SODIUM CHLORIDE 9 MG/ML
1000 INJECTION, SOLUTION INTRAVENOUS ONCE
Status: COMPLETED | OUTPATIENT
Start: 2024-03-03 | End: 2024-03-03

## 2024-03-03 NOTE — ED PROVIDER NOTES
Patient Seen in: [facility] Emergency Department      History     Chief Complaint   Patient presents with    Abdomen/Flank Pain     Stated Complaint: RUQ abdominal pain    Subjective:   HPI    This is a 53-year-old male presents emergency room for evaluation of right upper quadrant discomfort.  States symptoms have been intermittent, states he had an episode last week with pain in the right upper quadrant.  States today symptoms occurred again after eating lunch, states the pain does seem to travel towards the back.  Denies tearing type chest or abdominal pain.  Admits to nausea but denies vomiting or diarrhea.  Denies melena hematochezia.  Denies chest pain or shortness of breath.  Denies fevers or chills denies cough.  Denies any pleuritic type chest pain.  Denies fevers or chills.  Denies urinary symptoms.  Patient went to immediate care, urine dip performed, these were also reviewed which was negative.  Denies any trauma.    Objective:   Past Medical History:   Diagnosis Date    Anxiety     Belching     Bloating     Disorder of prostate 2016    Slightly enlarged    Flatulence/gas pain/belching     Food intolerance     Headache disorder     Hemorrhoids     Stress     Wears glasses               Past Surgical History:   Procedure Laterality Date    KNEE REPLACEMENT SURGERY      OTHER SURGICAL HISTORY  [id number]    left knee/\"removed a band\"                Social History     Socioeconomic History    Marital status:    Tobacco Use    Smoking status: Former     Packs/day: 1.00     Years: 18.00     Additional pack years: 0.00     Total pack years: 18.00     Types: Cigarettes     Quit date: 10/1/2004     Years since quittin.4    Smokeless tobacco: Former     Types: Chew     Quit date: 1985   Substance and Sexual Activity    Alcohol use: Yes     Alcohol/week: 6.0 standard drinks of alcohol     Types: 3 Cans of beer, 3 Standard drinks or equivalent per week      Comment: 3-4drinks/wk    Drug use: No    Sexual activity: Yes     Partners: Female   Other Topics Concern    Caffeine Concern Yes     Comment: 3x/day    Exercise Yes     Comment: walk, bike, run weights              Review of Systems    Positive for stated complaint: RUQ abdominal pain  Other systems are as noted in HPI.  Constitutional and vital signs reviewed.      All other systems reviewed and negative except as noted above.    Physical Exam     ED Triage Vitals [03/03/24 1627]   /90   Pulse 78   Resp 18   Temp 97 °F (36.1 °C)   Temp src Temporal   SpO2 97 %   O2 Device None (Room air)       Current:BP (!) 140/92   Pulse 58   Temp 97 °F (36.1 °C) (Temporal)   Resp 11   Ht 167.6 cm (5' 6\")   Wt 68 kg   SpO2 99%   BMI 24.21 kg/m²         Physical Exam    GENERAL: Patient is awake, alert, well-appearing, in no acute distress.  HEENT: no scleral icterus.  Mucous membranes are moist,  HEART: Regular rate and rhythm, no murmurs.  LUNGS: Clear to auscultation bilaterally.  No Rales, no rhonchi, no wheezing, no stridor.  ABDOMEN: Soft, nondistended, mild right upper quadrant tender, no periumbilical or McBurney point tenderness negative Rovsing sign, bowel sounds are present, no rebound, no rigidity, no guarding.no pulsatile masses. No CVA tenderness  EXTREMITIES: No peripheral edema, no calf tenderness  ED Course     Labs Reviewed   COMP METABOLIC PANEL (14) - Abnormal; Notable for the following components:       Result Value    Glucose 113 (*)     AST 2,280 (*)     ALT 1,969 (*)     Alkaline Phosphatase 183 (*)     All other components within normal limits   LIPASE - Abnormal; Notable for the following components:    Lipase 3,677 (*)     All other components within normal limits   CBC W/ DIFFERENTIAL - Abnormal; Notable for the following components:    Lymphocyte Absolute 0.93 (*)     All other components within normal limits   CBC WITH DIFFERENTIAL WITH PLATELET    Narrative:     The following orders  were created for panel order CBC With Differential With Platelet.  Procedure                               Abnormality         Status                     ---------                               -----------         ------                     CBC W/ DIFFERENTIAL[[id number]]          Abnormal            Final result                 Please view results for these tests on the individual orders.     EKG    Rate, intervals and axes as noted on EKG Report.  Rate: 58  Rhythm: Sinus Rhythm  Reading: Sinus bradycardia.  Right bundle branch block.                          MDM        Differential diagnosis before testing includes but not limited to cholecystitis, cholelithiasis, choledocholithiasis, pancreatitis, bowel obstruction, perforated viscus, which is a medical condition that poses a threat to life/function    Radiographic images  I personally reviewed the radiographs and my individual interpretation shows CT no free air  I also reviewed the official reports that showed CT with distended gallbladder, common bile duct dilated measuring 13 mm.  Distal common bile duct may be obstructed.  Ultrasound no hydronephrosis.  Dilated common bile duct noted.      Discussion of management (consult/physicians, social work, pharmacy,ect) GI Dr. Patel, [facility] Dr. Martel    Medications Provided: IV normal saline, Dilaudid    Course of Events during Emergency Room Visit include IV established blood work obtained.  CBC was unremarkable.  Lipase elevated at 3677.  Chemistry sodium 136 potassium 3.6 BUN 20 creatinine 1.09 glucose 113.  AST 2280 ALT 1969 alk phos 183.  Bilirubin 1.8.  Ultrasound and CT of the abdomen performed, discussed with GI and hospitalist.  Patient to remain n.p.o., will admit for further evaluation of acute pancreatitis, suspect choledocholithiasis.  Admit soft nonsurgical.  Discussed all results and plan the patient who is in agreement.    Shared decision making was utilized            Disposition:    Admission  I have discussed with the patient the results of test, differential diagnosis, and treatment plan. They expressed clear understanding of these instructions and agrees to the plan provided.         Note to patient: The 21st Century Cures Act makes medical notes like these available to patients in the interest of transparency. However, this is a medical document intended as peer to peer communication. It is written in medical language and may contain abbreviations or verbiage that are unfamiliar. It may appear blunt or direct. Medical documents are intended to carry relevant information, facts as evident, and the clinical opinion of the practitioner.           Admission disposition: 3/3/2024  9:07 PM                                        Medical Decision Making      Disposition and Plan     Clinical Impression:  1. Acute pancreatitis, unspecified complication status, unspecified pancreatitis type (HCC)    2. Elevated liver enzymes    3. Dilated cbd, acquired    4. Abdominal pain, acute         Disposition:  Admit  3/3/2024  9:07 pm    Follow-up:  No follow-up provider specified.        Medications Prescribed:  Current Discharge Medication List                            Hospital Problems       Present on Admission  Date Reviewed: 3/3/2024            ICD-10-CM Noted POA    * (Principal) Acute pancreatitis, unspecified complication status, unspecified pancreatitis type (HCC) K85.90 3/3/2024 Unknown    Abdominal pain, acute R10.9 3/3/2024 Unknown    Dilated cbd, acquired K83.8 3/3/2024 Unknown    Elevated liver enzymes R74.8 3/3/2024 Unknown

## 2024-03-03 NOTE — ED PROVIDER NOTES
Patient Seen in: [facility]      History     Chief Complaint   Patient presents with    Abdomen/Flank Pain     Stated Complaint: Abdominal Pain, back pain    Subjective:   54 yo male presents to the immediate care with c/o abdominal pain.  Patient states he had some upper abdominal pain last week.  He states the pain resolved on its own.  However for the last 2 days he has continued to have pain to his upper abdomen.  He states he bent over this morning and got a sharp shooting pain to his epigastric area.  Since then he has continued to have pain that now radiates across into his back.  He states has had some nausea with this but no vomiting.  Patient states he does feel bloated.  He denies any fever, chills, shortness of breath, chest pain, diarrhea, or urinary symptoms.      The history is provided by the patient.         Objective:   Past Medical History:   Diagnosis Date    Anxiety     Belching     Bloating     Disorder of prostate     Slightly enlarged    Flatulence/gas pain/belching     Food intolerance     Headache disorder     Hemorrhoids     Stress     Wears glasses               Past Surgical History:   Procedure Laterality Date    KNEE REPLACEMENT SURGERY      OTHER SURGICAL HISTORY  [id number]    left knee/\"removed a band\"                Social History     Socioeconomic History    Marital status:    Tobacco Use    Smoking status: Former     Packs/day: 1.00     Years: 18.00     Additional pack years: 0.00     Total pack years: 18.00     Types: Cigarettes     Quit date: 10/1/2004     Years since quittin.4    Smokeless tobacco: Former     Types: Chew     Quit date: 1985   Substance and Sexual Activity    Alcohol use: Yes     Alcohol/week: 6.0 standard drinks of alcohol     Types: 3 Cans of beer, 3 Standard drinks or equivalent per week     Comment: 3-4drinks/wk    Drug use: No    Sexual activity: Yes     Partners: Female   Other Topics  Concern    Caffeine Concern Yes     Comment: 3x/day    Exercise Yes     Comment: walk, bike, run weights              Review of Systems   Constitutional: Negative.    Gastrointestinal:  Positive for abdominal distention, abdominal pain and nausea. Negative for diarrhea and vomiting.   Genitourinary: Negative.    All other systems reviewed and are negative.      Positive for stated complaint: Abdominal Pain, back pain  Other systems are as noted in HPI.  Constitutional and vital signs reviewed.      All other systems reviewed and negative except as noted above.    Physical Exam     ED Triage Vitals [03/03/24 1506]   /73   Pulse 84   Resp 18   Temp 97.9 °F (36.6 °C)   Temp src Temporal   SpO2 98 %   O2 Device None (Room air)       Current:/73   Pulse 84   Temp 97.9 °F (36.6 °C) (Temporal)   Resp 18   Ht 167.6 cm (5' 6\")   Wt 69.4 kg   SpO2 98%   BMI 24.69 kg/m²         Physical Exam  Vitals and nursing note reviewed.   Constitutional:       General: He is not in acute distress.     Appearance: He is well-developed and normal weight. He is not ill-appearing.   HENT:      Head: Normocephalic and atraumatic.      Mouth/Throat:      Mouth: Mucous membranes are moist.      Pharynx: Oropharynx is clear.   Eyes:      Pupils: Pupils are equal, round, and reactive to light.   Cardiovascular:      Rate and Rhythm: Normal rate and regular rhythm.      Heart sounds: Normal heart sounds.   Pulmonary:      Effort: Pulmonary effort is normal. No respiratory distress.      Breath sounds: Normal breath sounds.   Abdominal:      General: Abdomen is flat. Bowel sounds are normal.      Palpations: Abdomen is soft.      Tenderness: There is abdominal tenderness in the right upper quadrant, epigastric area and left upper quadrant. There is no guarding. Negative signs include Leon's sign.   Skin:     General: Skin is warm and dry.   Neurological:      General: No focal deficit present.      Mental Status: He is alert  and oriented to person, place, and time.   Psychiatric:         Mood and Affect: Mood normal.         Behavior: Behavior normal.           ED Course     Labs Reviewed   [facility] POCT URINALYSIS DIPSTICK          ED Course as of 03/03/24 1546  ------------------------------------------------------------  Time: 03/03 1517  Value: POCT Urinalysis Dipstick  Comment: Negative for infection or pathology.             [facility]                             Medical Decision Making  53-year-old male with upper abdominal pain, nausea, and radiation of pain to the back.  Discussed with patient that due to his symptoms I feel he needs to go to the ER for further evaluation as we do not have the blood work and imaging necessary.  Feel that patient could have possible gastritis, cholecystitis, pancreatitis, or infection.  Patient states he will go to [facility].  Patient states he will drive himself.  Feel that he is stable to do this.  Encourage patient to not eat or drink prior to going to the ER.        Disposition and Plan     Clinical Impression:  1. Upper abdominal pain         Disposition:  Ic to ed  3/3/2024  3:23 pm    Follow-up:  No follow-up provider specified.        Medications Prescribed:  Discharge Medication List as of 3/3/2024  3:30 PM

## 2024-03-03 NOTE — ED INITIAL ASSESSMENT (HPI)
Pt here due to RUQ pain that started last week, it went away and then came back this morning with back pain also. Pt states that he has no change in urine or bowl habits. Pt stated that he did eat some lunch thinking that it would help but it make it a little worse. Pt denies N/V/D. Pt states that when he lays on his back it makes his stomach better. No BURTON.

## 2024-03-03 NOTE — ED INITIAL ASSESSMENT (HPI)
Patient started with pain to his abdomen a week ago- burning sensation. It went away for a few days but now is back and he feels like a pressure and burning to his upper abdomen. He has a sharp pain though the his right upper abdomen with palpation or bending. He also has bilateral flank pain that started to day- feels like a dull ache. No fevers. Took Zantac earlier today without improvement. Waves of nausea without vomiting as well.

## 2024-03-04 ENCOUNTER — APPOINTMENT (OUTPATIENT)
Dept: MRI IMAGING | Facility: HOSPITAL | Age: 54
End: 2024-03-04
Attending: HOSPITALIST
Payer: COMMERCIAL

## 2024-03-04 LAB
ALBUMIN SERPL-MCNC: 3.1 G/DL (ref 3.4–5)
ALBUMIN/GLOB SERPL: 1.1 {RATIO} (ref 1–2)
ALP LIVER SERPL-CCNC: 175 U/L
ALT SERPL-CCNC: 1505 U/L
ANION GAP SERPL CALC-SCNC: 4 MMOL/L (ref 0–18)
AST SERPL-CCNC: 991 U/L (ref 15–37)
ATRIAL RATE: 58 BPM
BASOPHILS # BLD AUTO: 0.04 X10(3) UL (ref 0–0.2)
BASOPHILS NFR BLD AUTO: 0.7 %
BILIRUB SERPL-MCNC: 1 MG/DL (ref 0.1–2)
BUN BLD-MCNC: 12 MG/DL (ref 9–23)
CALCIUM BLD-MCNC: 9.1 MG/DL (ref 8.5–10.1)
CHLORIDE SERPL-SCNC: 114 MMOL/L (ref 98–112)
CO2 SERPL-SCNC: 24 MMOL/L (ref 21–32)
CREAT BLD-MCNC: 0.9 MG/DL
EGFRCR SERPLBLD CKD-EPI 2021: 101 ML/MIN/1.73M2 (ref 60–?)
EOSINOPHIL # BLD AUTO: 0.2 X10(3) UL (ref 0–0.7)
EOSINOPHIL NFR BLD AUTO: 3.7 %
ERYTHROCYTE [DISTWIDTH] IN BLOOD BY AUTOMATED COUNT: 12.8 %
GLOBULIN PLAS-MCNC: 2.7 G/DL (ref 2.8–4.4)
GLUCOSE BLD-MCNC: 101 MG/DL (ref 70–99)
HCT VFR BLD AUTO: 42.7 %
HGB BLD-MCNC: 14.6 G/DL
IMM GRANULOCYTES # BLD AUTO: 0.01 X10(3) UL (ref 0–1)
IMM GRANULOCYTES NFR BLD: 0.2 %
LYMPHOCYTES # BLD AUTO: 1.27 X10(3) UL (ref 1–4)
LYMPHOCYTES NFR BLD AUTO: 23.4 %
MCH RBC QN AUTO: 29.3 PG (ref 26–34)
MCHC RBC AUTO-ENTMCNC: 34.2 G/DL (ref 31–37)
MCV RBC AUTO: 85.7 FL
MONOCYTES # BLD AUTO: 0.56 X10(3) UL (ref 0.1–1)
MONOCYTES NFR BLD AUTO: 10.3 %
NEUTROPHILS # BLD AUTO: 3.34 X10 (3) UL (ref 1.5–7.7)
NEUTROPHILS # BLD AUTO: 3.34 X10(3) UL (ref 1.5–7.7)
NEUTROPHILS NFR BLD AUTO: 61.7 %
OSMOLALITY SERPL CALC.SUM OF ELEC: 294 MOSM/KG (ref 275–295)
P AXIS: 57 DEGREES
P-R INTERVAL: 174 MS
PLATELET # BLD AUTO: 200 10(3)UL (ref 150–450)
POTASSIUM SERPL-SCNC: 3.8 MMOL/L (ref 3.5–5.1)
PROT SERPL-MCNC: 5.8 G/DL (ref 6.4–8.2)
Q-T INTERVAL: 404 MS
QRS DURATION: 136 MS
QTC CALCULATION (BEZET): 396 MS
R AXIS: -8 DEGREES
RBC # BLD AUTO: 4.98 X10(6)UL
SODIUM SERPL-SCNC: 142 MMOL/L (ref 136–145)
T AXIS: 12 DEGREES
VENTRICULAR RATE: 58 BPM
WBC # BLD AUTO: 5.4 X10(3) UL (ref 4–11)

## 2024-03-04 PROCEDURE — 99222 1ST HOSP IP/OBS MODERATE 55: CPT | Performed by: SURGERY

## 2024-03-04 PROCEDURE — 76376 3D RENDER W/INTRP POSTPROCES: CPT | Performed by: HOSPITALIST

## 2024-03-04 PROCEDURE — 99232 SBSQ HOSP IP/OBS MODERATE 35: CPT | Performed by: HOSPITALIST

## 2024-03-04 PROCEDURE — 74183 MRI ABD W/O CNTR FLWD CNTR: CPT | Performed by: HOSPITALIST

## 2024-03-04 RX ORDER — GADOTERATE MEGLUMINE 376.9 MG/ML
15 INJECTION INTRAVENOUS
Status: COMPLETED | OUTPATIENT
Start: 2024-03-04 | End: 2024-03-04

## 2024-03-04 RX ORDER — BUTALBITAL, ACETAMINOPHEN AND CAFFEINE 50; 325; 40 MG/1; MG/1; MG/1
1 TABLET ORAL EVERY 4 HOURS PRN
Status: DISCONTINUED | OUTPATIENT
Start: 2024-03-04 | End: 2024-03-07

## 2024-03-04 NOTE — PROGRESS NOTES
Pt reports mild abdominal discomfort. Denies need for pain medication at this time. NPO. IVF infusing. Bowel sounds are active. Denies nausea. Denies passing flatus. Last BM yesterday. Reports moderate headache he believes is related to not eating and lack of caffeine. He states that he does get migraines and takes excedrin PRN at home. Voiding without difficulty. Ambulatory. Plan discussed for MRI and surgical consult today. All questions addressed.     1243 Pt voided 800 ml, .

## 2024-03-04 NOTE — ED QUICK NOTES
Orders for admission, patient is aware of plan and ready to go upstairs. Any questions, please call ED RN mateo at extension [phone].     Patient Covid vaccination status: Fully vaccinated     COVID Test Ordered in ED: None    COVID Suspicion at Admission: N/A    Running Infusions:  None    Mental Status/LOC at time of transport: aox4    Other pertinent information:   CIWA score: N/A   NIH score:  N/A

## 2024-03-04 NOTE — PROGRESS NOTES
[facility]   part of [facility]     Hospitalist Progress Note     Mateo Byrd Patient Status:  Inpatient    1970 MRN [id number]   Location [facility] 3NW-A Attending Rene Walker MD   Hosp Day # 1 PCP ELIEL KINGSLEY DO     Chief Complaint: abd pain    Subjective:     Patient improved    Objective:    Review of Systems:   A comprehensive review of systems was completed; pertinent positive and negatives stated in subjective.    Vital signs:  Temp:  [97 °F (36.1 °C)-98.4 °F (36.9 °C)] 98.4 °F (36.9 °C)  Pulse:  [51-78] 55  Resp:  [10-18] 18  BP: (107-155)/(68-95) 133/73  SpO2:  [94 %-99 %] 99 %    Physical Exam:    General: No acute distress  Respiratory: No wheezes, no rhonchi  Cardiovascular: S1, S2, regular rate and rhythm  Abdomen: Soft, RUQ-tender, non-distended, positive bowel sounds  Neuro: No new focal deficits.   Extremities: No edema      Diagnostic Data:    Labs:  Recent Labs   Lab 24  1803 03/04/24  0527   WBC 6.5 5.4   HGB 16.3 14.6   MCV 85.8 85.7   .0 200.0       Recent Labs   Lab 24  0527   * 101*   BUN 20 12   CREATSERUM 1.09 0.90   CA 10.1 9.1   ALB 3.9 3.1*    142   K 3.6 3.8    114*   CO2 31.0 24.0   ALKPHO 183* 175*   AST 2,280* 991*   ALT 1,969* 1,505*   BILT 1.8 1.0   TP 7.3 5.8*       Estimated Creatinine Clearance: 83.7 mL/min (based on SCr of 0.9 mg/dL).    No results for input(s): \"TROP\", \"TROPHS\", \"CK\" in the last 168 hours.    No results for input(s): \"PTP\", \"INR\" in the last 168 hours.               Microbiology    No results found for this visit on 24.      Imaging: Reviewed in Epic.    Medications:    enoxaparin  40 mg Subcutaneous Daily       Assessment & Plan:      #Acute Pancreatitis, likely GB induced  IVF  NPO  Pain control  MRCP pending  Gen surg     #Transaminase elevation  Due to above  MRCP  IVF  Monitor     #Distended Bladder  Asymptomatic  Likely due to BPH  Flomax when  able  RVP      Rene Walker MD    Supplementary Documentation:     Quality:  DVT Mechanical Prophylaxis:     Early ambuation  DVT Pharmacologic Prophylaxis   Medication    enoxaparin (Lovenox) 40 MG/0.4ML SUBQ injection 40 mg                Code Status: Not on file  Dominguez: No urinary catheter in place  Dominguez Duration (in days):   Central line:    DESTINY:     Discharge is dependent on: course  At this point Mr. Byrd is expected to be discharge to: home    The 21st Century Cures Act makes medical notes like these available to patients in the interest of transparency. Please be advised this is a medical document. Medical documents are intended to carry relevant information, facts as evident, and the clinical opinion of the practitioner. The medical note is intended as peer to peer communication and may appear blunt or direct. It is written in medical language and may contain abbreviations or verbiage that are unfamiliar.

## 2024-03-04 NOTE — PLAN OF CARE
A&Ox4. VSS. RA. Denies chest pain and SOB.   GI: Abdomen soft, nondistended. Passing gas. BM 3/3.   Denies nausea.   : Voids in bathroom.   No pain meds given at this time.   Up ad bossman in room.   Diet: NPO with ice chips  IVF running per order.   All appropriate safety measures in place. All questions and concerns addressed.

## 2024-03-04 NOTE — H&P
[facility]     Mateo Byrd Patient Status:  Inpatient    1970 MRN [id number]   Location [facility] 3NW-A Attending Wander Martel MD   Hosp Day # 0 PCP ELIEL KINGSLEY DO     Chief Complaint:   Chief Complaint   Patient presents with    Abdomen/Flank Pain       Subjective:    History of Present Illness:     Mateo Byrd is a 53 year old male with a past medical history of anxiety and enlarged prostate.  He comes the emergency room secondary to abdominal pain.  In the emergency room imaging shows distended gallbladder consistent with likely cholecystitis.  Lipase also elevated.     History/Other:    Past Medical History:  Past Medical History:   Diagnosis Date    Anxiety     Belching     Bloating     Disorder of prostate     Slightly enlarged    Flatulence/gas pain/belching     Food intolerance     Headache disorder     Hemorrhoids     Stress     Wears glasses      Past Surgical History:   Past Surgical History:   Procedure Laterality Date    KNEE REPLACEMENT SURGERY      OTHER SURGICAL HISTORY  [id number]    left knee/\"removed a band\"      Family History:   Family History   Problem Relation Age of Onset    Cancer Father         abdominal    Diabetes Mother         prediabetes    Hypertension Mother     Lipids Mother     Breast Cancer Mother     Uterine Cancer Mother     Colon Polyps Mother     Other (Other[other]) Daughter         cerebral palsy    Heart Disorder Maternal Grandmother     Hypertension Maternal Grandmother     Cancer Maternal Grandfather         bone cancer    Other (Other[other]) Paternal Grandmother         emphysema    Hypertension Brother     Lipids Brother     Prostate Cancer Paternal Uncle     Hypertension Brother      Social History:    reports that he quit smoking about 19 years ago. His smoking use included cigarettes. He has a 18 pack-year smoking history. He quit smokeless tobacco use about  39 years ago.  His smokeless tobacco use included chew. He reports current alcohol use of about 6.0 standard drinks of alcohol per week. He reports that he does not use drugs.     Allergies:   Allergies   Allergen Reactions    Seasonal        Medications:    No current facility-administered medications on file prior to encounter.     Current Outpatient Medications on File Prior to Encounter   Medication Sig Dispense Refill    raNITIdine HCl (ZANTAC OR) Inject as directed.      PEG 3350-KCl-Na Bicarb-NaCl 420 g Oral Recon Soln Take as directed by physician (Patient not taking: Reported on 3/3/2024) 4000 mL 0    Zinc 100 MG Oral Tab       TURMERIC CURCUMIN OR       Cholecalciferol (VITAMIN D) 25 MCG (1000 UT) Oral Tab       Lactobacillus (PROBIOTIC ACIDOPHILUS) Oral Cap Take by mouth.      Multiple Vitamins-Minerals (MULTIVITAMIN OR) Take 1 tablet by mouth daily.         Omega-3 Fatty Acids (FISH OIL) 1200 MG Oral Cap Take 1 capsule by mouth daily.      Loratadine 10 MG Oral Cap Take 1 capsule by mouth daily as needed.         Review of Systems:   A comprehensive review of systems was completed.    Pertinent positives and negatives noted in the HPI.    Objective:   Physical Exam:    BP (!) 140/92   Pulse 58   Temp 97 °F (36.1 °C) (Temporal)   Resp 11   Ht 5' 6\" (1.676 m)   Wt 150 lb (68 kg)   SpO2 99%   BMI 24.21 kg/m²   General: No acute distress, Alert  Respiratory: No rhonchi, no wheezes  Cardiovascular: S1, S2.   Abdomen: Soft, TTP, Non-distended, Positive bowel sounds  Neuro: No new focal deficits  Extremities: No edema      Results:    Labs:      Labs Last 24 Hours:  Recent Labs   Lab 03/03/24  1803   WBC 6.5   HGB 16.3   MCV 85.8   .0       Recent Labs   Lab 03/03/24  1803   *   BUN 20   CREATSERUM 1.09   CA 10.1   ALB 3.9      K 3.6      CO2 31.0   ALKPHO 183*   AST 2,280*   ALT 1,969*   BILT 1.8   TP 7.3       Estimated Creatinine Clearance: 69.1 mL/min (based on SCr of 1.09  mg/dL).    No results for input(s): \"TROP\", \"TROPHS\", \"CK\" in the last 168 hours.    No results for input(s): \"PTP\", \"INR\" in the last 168 hours.    No results for input(s): \"TROP\", \"CK\" in the last 168 hours.      Imaging: Imaging data reviewed in Epic.    Assessment & Plan:      #Acute Pancreatitis, likely GB induced  IVF  NPO  Pain control  MRCP    #Transaminase elevation  Due to above  MRCP  IVF  Monitor    #Distended Bladder  Asymptomatic  Likely due to BPH  Flomax when able        Plan of care discussed with ED physician    Wander Martel MD    Supplementary Documentation:     The 21st Century Cures Act makes medical notes like these available to patients in the interest of transparency. Please be advised this is a medical document. Medical documents are intended to carry relevant information, facts as evident, and the clinical opinion of the practitioner. The medical note is intended as peer to peer communication and may appear blunt or direct. It is written in medical language and may contain abbreviations or verbiage that are unfamiliar.

## 2024-03-04 NOTE — CONSULTS
[facility]  Report of Consultation    Mateo Byrd Patient Status:  Inpatient    1970 MRN [id number]   Location [facility] 3NW-A Attending Rene Walker MD   Hosp Day # 1 PCP ELIEL KINGSLEY DO     Requesting Physician:  Dr. Martel    Reason for Consultation:  Biliary pancreatitis    I seen and examined the patient in conjunction with my physician assistant.  I reviewed all data and imaging from this admission; I concur with radiologist report.  I have modified this document to reflect my data interpretation, my physical exam and my care plan.    Chief Complaint:  Abdominal pain    History of Present Illness:  Mateo Byrd is a 53 year old male who presents to [facility] on 3/3/2024 with complaints of abdominal pain.  Patient states that he had a sudden onset of right upper quadrant abdominal pain on the day of his admission.  He states the pain radiated into his right flank and right back area.  He reported mild associated nausea.  He denies vomiting.  He denies diarrhea or constipation.  He denies jaundice.  He denies weight loss.  He denies change in urinary output.  He states that the pain was severe.  He states that he had similar episode of abdominal pain several days ago.  He reported mild right upper quadrant abdominal pain that did not radiate into the back.  He stated that the pain lasted for several days and resolved 2 days prior to his admission.    Upon presentation to the hospital, the patient was afebrile and hemodynamically stable.  WBC 6.5 hemoglobin 16.3 platelets 218,000.  BUN 20 creatinine 1.09 alkaline phosphatase 183 AST 2280 ALT 1969 total bilirubin 1.8 lipase 3677.  Ultrasound of the abdomen with findings of dilated common bile duct.    Past abdominal surgical history is negative for previous abdominal surgery        History:  Past Medical History:   Diagnosis Date    Anxiety     Belching     Bloating     Disorder of prostate      Slightly enlarged    Flatulence/gas pain/belching 2005    Food intolerance 2005    Headache disorder 1995    Hemorrhoids 2010    Stress 2005    Wears glasses 1995     Past Surgical History:   Procedure Laterality Date    KNEE REPLACEMENT SURGERY      OTHER SURGICAL HISTORY  [id number]    left knee/\"removed a band\"     Family History   Problem Relation Age of Onset    Cancer Father         abdominal    Diabetes Mother         prediabetes    Hypertension Mother     Lipids Mother     Breast Cancer Mother     Uterine Cancer Mother     Colon Polyps Mother     Other (Other[other]) Daughter         cerebral palsy    Heart Disorder Maternal Grandmother     Hypertension Maternal Grandmother     Cancer Maternal Grandfather         bone cancer    Other (Other[other]) Paternal Grandmother         emphysema    Hypertension Brother     Lipids Brother     Prostate Cancer Paternal Uncle     Hypertension Brother       reports that he quit smoking about 19 years ago. His smoking use included cigarettes. He has a 18 pack-year smoking history. He quit smokeless tobacco use about 39 years ago.  His smokeless tobacco use included chew. He reports current alcohol use of about 6.0 standard drinks of alcohol per week. He reports that he does not use drugs.    Allergies:  Allergies   Allergen Reactions    Seasonal        Medications:  Medications Prior to Admission   Medication Sig    raNITIdine HCl (ZANTAC OR) Inject as directed.    Zinc 100 MG Oral Tab     TURMERIC CURCUMIN OR     Cholecalciferol (VITAMIN D) 25 MCG (1000 UT) Oral Tab     Lactobacillus (PROBIOTIC ACIDOPHILUS) Oral Cap Take by mouth.    Multiple Vitamins-Minerals (MULTIVITAMIN OR) Take 1 tablet by mouth daily.       Omega-3 Fatty Acids (FISH OIL) 1200 MG Oral Cap Take 1 capsule by mouth daily.    PEG 3350-KCl-Na Bicarb-NaCl 420 g Oral Recon Soln Take as directed by physician (Patient not taking: Reported on 3/3/2024)    Loratadine 10 MG Oral Cap Take 1 capsule by mouth daily  as needed.         Current Facility-Administered Medications:     lactated ringers infusion, , Intravenous, Continuous    HYDROmorphone (Dilaudid) 1 MG/ML injection 0.2 mg, 0.2 mg, Intravenous, Q2H PRN **OR** HYDROmorphone (Dilaudid) 1 MG/ML injection 0.4 mg, 0.4 mg, Intravenous, Q2H PRN **OR** HYDROmorphone (Dilaudid) 1 MG/ML injection 0.8 mg, 0.8 mg, Intravenous, Q2H PRN    ondansetron (Zofran) 4 MG/2ML injection 4 mg, 4 mg, Intravenous, Q6H PRN    acetaminophen (Tylenol Extra Strength) tab 1,000 mg, 1,000 mg, Oral, Q4H PRN    melatonin tab 3 mg, 3 mg, Oral, Nightly PRN    glycerin-hypromellose- (Artifical Tears) 0.2-0.2-1 % ophthalmic solution 1 drop, 1 drop, Both Eyes, QID PRN    sodium chloride (Saline Mist) 0.65 % nasal solution 1 spray, 1 spray, Each Nare, Q3H PRN    enoxaparin (Lovenox) 40 MG/0.4ML SUBQ injection 40 mg, 40 mg, Subcutaneous, Daily    ondansetron (Zofran) 4 MG/2ML injection 4 mg, 4 mg, Intravenous, Q6H PRN    prochlorperazine (Compazine) 10 MG/2ML injection 5 mg, 5 mg, Intravenous, Q8H PRN    polyethylene glycol (PEG 3350) (Miralax) 17 g oral packet 17 g, 17 g, Oral, Daily PRN    sennosides (Senokot) tab 17.2 mg, 17.2 mg, Oral, Nightly PRN    bisacodyl (Dulcolax) 10 MG rectal suppository 10 mg, 10 mg, Rectal, Daily PRN    fleet enema (Fleet) 7-19 GM/118ML rectal enema 133 mL, 1 enema, Rectal, Once PRN    Review of Systems:  Review of Systems   Constitutional:  Negative for appetite change, chills, fatigue, fever and unexpected weight change.   HENT:  Negative for facial swelling, rhinorrhea and sore throat.    Eyes:  Negative for pain and itching.   Respiratory:  Negative for cough, chest tightness and shortness of breath.    Cardiovascular:  Negative for chest pain and leg swelling.   Gastrointestinal:  Positive for nausea and abdominal pain. Negative for vomiting, diarrhea, constipation and abdominal distention.   Genitourinary:  Negative for urgency, hematuria and difficulty  urinating.   Musculoskeletal:  Negative for back pain and neck pain.   Skin:  Negative for color change, pallor, rash and wound.   Neurological:  Negative for dizziness, speech difficulty, weakness, light-headedness and numbness.   Hematological:  Negative for adenopathy. Does not bruise/bleed easily.   Psychiatric/Behavioral:  Negative for hallucinations and agitation.        Physical Exam:  /68 (BP Location: Left arm)   Pulse 51   Temp 97.7 °F (36.5 °C) (Oral)   Resp 18   Ht 66\"   Wt 150 lb (68 kg)   SpO2 95%   BMI 24.21 kg/m²   Physical Exam  Constitutional:       General: He is not in acute distress.     Appearance: Normal appearance. He is not ill-appearing.   HENT:      Head: Normocephalic and atraumatic.   Cardiovascular:      Rate and Rhythm: Normal rate and regular rhythm.      Pulses: Normal pulses.   Pulmonary:      Effort: Pulmonary effort is normal. No respiratory distress.   Abdominal:      General: There is no distension.      Palpations: Abdomen is soft.      Tenderness: There is abdominal tenderness. There is no guarding or rebound.      Comments: Abdomen soft, mildly distended, positive right upper quadrant tenderness to palpation, no rebound tenderness, no guarding.   Musculoskeletal:         General: Normal range of motion.      Cervical back: Normal range of motion.   Skin:     General: Skin is warm and dry.   Neurological:      General: No focal deficit present.      Mental Status: He is alert and oriented to person, place, and time.   Psychiatric:         Mood and Affect: Mood normal.         Behavior: Behavior normal.         Laboratory Data:  Recent Labs   Lab 03/03/24  1803 03/04/24  0527   RBC 5.49 4.98   HGB 16.3 14.6   HCT 47.1 42.7   MCV 85.8 85.7   MCH 29.7 29.3   MCHC 34.6 34.2   RDW 12.7 12.8   NEPRELIM 4.64 3.34   WBC 6.5 5.4   .0 200.0       Recent Labs   Lab 03/03/24  1803 03/04/24  0527   * 101*   BUN 20 12   CREATSERUM 1.09 0.90   CA 10.1 9.1   ALB 3.9  3.1*    142   K 3.6 3.8    114*   CO2 31.0 24.0   ALKPHO 183* 175*   AST 2,280* 991*   ALT 1,969* 1,505*   BILT 1.8 1.0   TP 7.3 5.8*         No results for input(s): \"PTP\", \"INR\", \"PTT\" in the last 168 hours.      CT ABDOMEN+PELVIS(CONTRAST ONLY)(CPT=74177)    Result Date: 3/3/2024  CONCLUSION:   1. Distended gallbladder is noted.  Common bile duct proximally is dilated measuring 13 mm.  Distal common bile duct may be obstructed.  A definite lesion is not identified.  A follow-up MRCP may be done.  Follow-up MRI abdomen examination with and without contrast would also be helpful.  2. Markedly distended bladder.    LOCATION:  [facility]   Dictated by (CST): Nam Peñaloza MD on 3/03/2024 at 8:55 PM     Finalized by (CST): Nam Peñaloza MD on 3/03/2024 at 8:59 PM       US ABDOMEN COMPLETE (CPT=76700)    Result Date: 3/3/2024  CONCLUSION:   1. No hydronephrosis.  2. Dilated common bile duct is noted.  If there is suspicion for a distal obstructing calculus or lesion a follow-up MRCP may be done.  The common bile duct measures 14 mm.    LOCATION:  [facility]    Dictated by (CST): Nam Peñaloza MD on 3/03/2024 at 7:47 PM     Finalized by (CST): Nam Peñaloza MD on 3/03/2024 at 7:48 PM          April Smith PA-C  3/4/2024  9:04 AM      Medical Decision Making         Impression:  Patient Active Problem List   Diagnosis    Chronic prostatitis    Special screening for malignant neoplasm of colon    Family history of colonic polyps    Benign neoplasm of cecum    Benign neoplasm of ascending colon    Acute pancreatitis, unspecified complication status, unspecified pancreatitis type (HCC)    Elevated liver enzymes    Dilated cbd, acquired    Abdominal pain, acute       Acute biliary pancreatitis.  Elevated transaminases  Normal bilirubin    The patient has pancreatitis likely secondary to a passed gallstone.  No evidence of biliary obstruction as evidenced by normal bilirubin  Dilation of the common  bile duct of uncertain etiology; could represent distal obstruction, inflammatory change or stricture.  Markedly dilated gallbladder  Optimize pain control and avoid opioids  Will order MRCP to better assess the biliary tree and evaluate for common bile duct obstruction.  Further surgical recommendations after MRCP complete  If MRCP is normal, patient may have trial of diet in anticipation of elective outpatient cholecystectomy.  If abnormal MRCP, we will proceed accordingly with further diagnostic workup.  Care plan discussed with patient all questions answered  The patient was provided ample opportunity to ask questions.  All of the patient's questions were answered in detail.  The patient voiced understanding of the care plan.      Lyle Negrete MD FACS  Trauma Medical Director, [facility] General Surgery    The 21st Century Cures Act makes medical notes like these available to patients in the interest of transparency. Please be advised this is a medical document. Medical documents are intended to carry relevant information, facts as evident, and the clinical opinion of the practitioner. The medical note is intended as peer to peer communication and may appear blunt or direct. It is written in medical language and may contain abbreviations or verbiage that are unfamiliar.    This note was prepared using Dragon Medical voice recognition dictation software. As a result, errors may occur. When identified, these errors have been corrected. While every attempt is made to correct errors during dictation, discrepancies may still exist.

## 2024-03-05 ENCOUNTER — ANESTHESIA EVENT (OUTPATIENT)
Dept: ENDOSCOPY | Facility: HOSPITAL | Age: 54
End: 2024-03-05
Payer: COMMERCIAL

## 2024-03-05 LAB
ALBUMIN SERPL-MCNC: 3 G/DL (ref 3.4–5)
ALBUMIN/GLOB SERPL: 1.1 {RATIO} (ref 1–2)
ALP LIVER SERPL-CCNC: 154 U/L
ALT SERPL-CCNC: 875 U/L
ANION GAP SERPL CALC-SCNC: 9 MMOL/L (ref 0–18)
AST SERPL-CCNC: 294 U/L (ref 15–37)
BASOPHILS # BLD AUTO: 0.03 X10(3) UL (ref 0–0.2)
BASOPHILS NFR BLD AUTO: 0.5 %
BILIRUB SERPL-MCNC: 1 MG/DL (ref 0.1–2)
BUN BLD-MCNC: 11 MG/DL (ref 9–23)
CALCIUM BLD-MCNC: 9.1 MG/DL (ref 8.5–10.1)
CANCER AG19-9 SERPL-ACNC: 23.9 U/ML (ref ?–37)
CHLORIDE SERPL-SCNC: 110 MMOL/L (ref 98–112)
CO2 SERPL-SCNC: 22 MMOL/L (ref 21–32)
CREAT BLD-MCNC: 0.98 MG/DL
DEPRECATED HBV CORE AB SER IA-ACNC: 284.2 NG/ML
EGFRCR SERPLBLD CKD-EPI 2021: 91 ML/MIN/1.73M2 (ref 60–?)
EOSINOPHIL # BLD AUTO: 0.17 X10(3) UL (ref 0–0.7)
EOSINOPHIL NFR BLD AUTO: 2.9 %
ERYTHROCYTE [DISTWIDTH] IN BLOOD BY AUTOMATED COUNT: 12.7 %
FOLATE SERPL-MCNC: 19.8 NG/ML (ref 8.7–?)
GLOBULIN PLAS-MCNC: 2.7 G/DL (ref 2.8–4.4)
GLUCOSE BLD-MCNC: 88 MG/DL (ref 70–99)
HAV IGM SER QL: NONREACTIVE
HBV CORE AB SERPL QL IA: NONREACTIVE
HBV SURFACE AB SER QL: NONREACTIVE
HBV SURFACE AB SERPL IA-ACNC: <3.1 MIU/ML
HBV SURFACE AG SER-ACNC: 0.2 [IU]/L
HBV SURFACE AG SERPL QL IA: NONREACTIVE
HCT VFR BLD AUTO: 45.4 %
HCV AB SERPL QL IA: NONREACTIVE
HGB BLD-MCNC: 14.9 G/DL
IMM GRANULOCYTES # BLD AUTO: 0.02 X10(3) UL (ref 0–1)
IMM GRANULOCYTES NFR BLD: 0.3 %
IMMUNOGLOBULIN PNL SER-MCNC: 869 MG/DL (ref 791–1643)
IRON SATN MFR SERPL: 21 %
IRON SERPL-MCNC: 66 UG/DL
LIPASE SERPL-CCNC: 158 U/L (ref ?–300)
LYMPHOCYTES # BLD AUTO: 1.28 X10(3) UL (ref 1–4)
LYMPHOCYTES NFR BLD AUTO: 22.1 %
MCH RBC QN AUTO: 28.9 PG (ref 26–34)
MCHC RBC AUTO-ENTMCNC: 32.8 G/DL (ref 31–37)
MCV RBC AUTO: 88 FL
MONOCYTES # BLD AUTO: 0.43 X10(3) UL (ref 0.1–1)
MONOCYTES NFR BLD AUTO: 7.4 %
NEUTROPHILS # BLD AUTO: 3.87 X10 (3) UL (ref 1.5–7.7)
NEUTROPHILS # BLD AUTO: 3.87 X10(3) UL (ref 1.5–7.7)
NEUTROPHILS NFR BLD AUTO: 66.8 %
OSMOLALITY SERPL CALC.SUM OF ELEC: 291 MOSM/KG (ref 275–295)
PLATELET # BLD AUTO: 202 10(3)UL (ref 150–450)
POTASSIUM SERPL-SCNC: 3.6 MMOL/L (ref 3.5–5.1)
PROT SERPL-MCNC: 5.7 G/DL (ref 6.4–8.2)
RBC # BLD AUTO: 5.16 X10(6)UL
SODIUM SERPL-SCNC: 141 MMOL/L (ref 136–145)
TIBC SERPL-MCNC: 310 UG/DL (ref 240–450)
TRANSFERRIN SERPL-MCNC: 208 MG/DL (ref 200–360)
VIT B12 SERPL-MCNC: 1316 PG/ML (ref 193–986)
WBC # BLD AUTO: 5.8 X10(3) UL (ref 4–11)

## 2024-03-05 PROCEDURE — 99232 SBSQ HOSP IP/OBS MODERATE 35: CPT | Performed by: SURGERY

## 2024-03-05 PROCEDURE — 99232 SBSQ HOSP IP/OBS MODERATE 35: CPT | Performed by: HOSPITALIST

## 2024-03-05 NOTE — PROGRESS NOTES
[facility]  Progress Note    Mateo Byrd Patient Status:  Inpatient    1970 MRN [id number]   Location [facility] 3NW-A Attending Rene Walker MD   Hosp Day # 2 PCP ELIEL KINGSLEY,      Subjective:  The patient was seen and examined at bedside.  No acute events overnight.  The patient denies significant abdominal pain.  He is tolerating a diet.    MRCP yesterday revealed a dilated gallbladder with trace pericholecystic fluid.  He has a dilated common bile duct to 1 cm in diameter with abrupt tapering within the mid to distal segment.  No enhancing lesion is identified.  ERCP is recommended.  He also has findings suggestive of pancreas divisum.     LFTs trending downward.  ,  and alkaline phosphatase 154.  Total bilirubin 1.0.    Objective/Physical Exam:  /74 (BP Location: Left arm)   Pulse 70   Temp 98.4 °F (36.9 °C) (Oral)   Resp 18   Ht 66\"   Wt 153 lb (69.4 kg)   SpO2 99%   BMI 24.69 kg/m²     Intake/Output Summary (Last 24 hours) at 3/5/2024 1023  Last data filed at 3/5/2024 0800  Gross per 24 hour   Intake 360 ml   Output 800 ml   Net -440 ml         General: Alert, oriented x3. No acute distress.  HEENT: Normocephalic, atraumatic. No scleral icterus.  Pulmonary: No respiratory distress, effort normal.   Abdomen: Non-distended, without tympany to percussion. Soft, non-tender to palpation. No rebound or guarding. No peritoneal signs.   Extremities: No lower extremity edema. No clubbing or cyanosis.   Skin: Warm, dry. No jaundice.       Labs:  Lab Results   Component Value Date    WBC 5.8 2024    HGB 14.9 2024    HCT 45.4 2024    .0 2024      No results found for: \"PT\", \"INR\"  Lab Results   Component Value Date     2024    K 3.6 2024     2024    CO2 22.0 2024    BUN 11 2024    CREATSERUM 0.98 2024    GLU 88 2024    CA 9.1 2024    ALKPHO 154 2024      03/05/2024     03/05/2024    BILT 1.0 03/05/2024    ALB 3.0 03/05/2024    TP 5.7 03/05/2024          Assessment:  Patient Active Problem List   Diagnosis    Chronic prostatitis    Special screening for malignant neoplasm of colon    Family history of colonic polyps    Benign neoplasm of cecum    Benign neoplasm of ascending colon    Acute pancreatitis, unspecified complication status, unspecified pancreatitis type (HCC)    Elevated liver enzymes    Dilated cbd, acquired    Abdominal pain, acute     Acute pancreatitis  Transaminitis  Common bile duct stricture    Plan:  Dr. Negrete discussed the patient's MRCP results with him.  No plan for acute general surgical intervention at this time  GI on consult for an ERCP to further evaluate common bile duct stricture  Continue to trend LFTs  Diet as tolerated from a general surgical standpoint  Antiemetics and analgesics as needed  Encourage ambulation  DVT prophylaxis with Lovenox      The patient was discussed with Dr. Negrete, and he is in agreement with the assessment and plan. My total face time with this patient was 25 minutes. Greater than half of the visit was spent in counseling the patient on the above listed diagnoses and treatment options.     Nehal Alvarenga PA-C  3/5/2024  10:23 AM    Addendum:    I have seen and examined the patient; I obtained and performed the above history, physical examination, assessment and plan.  I have I have edited the above to reflect my evaluation, opinion, and physical exam findings.    Exam and plan as above.    Care plan discussed with patient.  I reviewed imaging results from last evening.  I briefly explained findings to the patient and possible ramifications thereof.  There is dilation of the common bile duct with abrupt tapering in the mid to distal segment of questionable significance.  There is also findings of pancreas divisum.  This may best be evaluated by EUS/ERCP.  Await GI recommendations.   Patient is pain-free and may initiate diet when cleared by GI.    The patient may benefit from elective outpatient cholecystectomy.  Further recommendations as the clinical course evolves.    I, Dr. Lyle Negrete, personally performed the services described in this documentation  by Ms Alvarenga, and they are both accurate and complete.    Lyle Negrete MD FACS  [facility] General Surgery

## 2024-03-05 NOTE — PLAN OF CARE
Pt is alert and oriented x4.  VSS.  Maintaining o2 sats on r/a.  Pt voiding freely.  No BM this shift.  MRI late last night.  Dr Negrete paged this AM, okay to resume diet as tolerated.  IVF as ordered.  Pt updated w/ poc.  All questions and concerns addressed at this time.

## 2024-03-05 NOTE — CONSULTS
[facility]                       [facility]    Mateo Byrd Patient Status:  Inpatient    1970 MRN [id number]   Location [facility] 3NW-A Attending Rene Walker MD   Hosp Day # 2 PCP ELIEL KINGSLEY DO     Reason for consultation: elevated LFTs; abnormal biliary tree imaging   HPI: This is a 53-year-old male with no significant past medical history who presented to the ER 3/3 with abdominal pain.  Patient reports being in his usual state of health until 3/3 when he suddenly developed severe RUQ abd pain with radiation and bandlike manner throughout his upper abdomen and back.  Symptoms progressed to nausea without vomiting patient reports similar less severe symptoms approximately 1 week prior which self resolved.  No fevers, chills, diarrhea, vomiting, melena, or hematochezia.  Patient reports rare alcohol intake (no recent intake) and does not use prescription medications on a regular basis.  Patient reports using turmeric, fish oil, and probiotics most days--no other herbal supplements.  He reports a stable weight and appetite prior to onset of symptoms.  CT A/P IV (3/3) suggests distended gallbladder with CBD dilated to 13 mm and an unremarkable PD; MRI abdomen/MRCP (3/4) suggests dilated gallbladder with trace pericholecystic fluid, dilated CBD to 1 cm with abrupt tapering within the mid to distal segment, and findings suggestive of pancreatic divisum with a mild prominence of the main pancreatic duct measuring 5 mm; labs with improving LFTs (  Alk Phos 154 bili 1) from (AST 2280 ALT 1969 Alk Phos 183 bili 1.8) on arrival with normal levels in 2023, normal CBC  Patient reports a near complete resolution of abdominal discomfort and has tolerated an advancement of diet without nausea.  PMHx:   Past Medical History:   Diagnosis Date    Anxiety 2005    Belching 2005    Bloating     Disorder of prostate     Slightly  enlarged    Flatulence/gas pain/belching 2005    Food intolerance 2005    Headache disorder 1995    Hemorrhoids 2010    Stress 2005    Wears glasses 1995                PSHx:   Past Surgical History:   Procedure Laterality Date    KNEE REPLACEMENT SURGERY      OTHER SURGICAL HISTORY  [id number]    left knee/\"removed a band\"     Medications:    [COMPLETED] gadoterate meglumine (Dotarem) 7.5 MMOL/15ML injection 15 mL  15 mL Intravenous ONCE PRN    butalbital-acetaminophen-caffeine (Fioricet) -40 MG per tab 1 tablet  1 tablet Oral Q4H PRN    [COMPLETED] iopamidol 76% (ISOVUE-370) injection for power injector  85 mL Intravenous ONCE PRN    [COMPLETED] sodium chloride 0.9 % IV bolus 1,000 mL  1,000 mL Intravenous Once    [COMPLETED] sodium chloride 0.9% infusion 1,000 mL  1,000 mL Intravenous Once    [COMPLETED] HYDROmorphone (Dilaudid) 1 MG/ML injection 0.5 mg  0.5 mg Intravenous Once    lactated ringers infusion   Intravenous Continuous    HYDROmorphone (Dilaudid) 1 MG/ML injection 0.2 mg  0.2 mg Intravenous Q2H PRN    Or    HYDROmorphone (Dilaudid) 1 MG/ML injection 0.4 mg  0.4 mg Intravenous Q2H PRN    Or    HYDROmorphone (Dilaudid) 1 MG/ML injection 0.8 mg  0.8 mg Intravenous Q2H PRN    ondansetron (Zofran) 4 MG/2ML injection 4 mg  4 mg Intravenous Q6H PRN    acetaminophen (Tylenol Extra Strength) tab 1,000 mg  1,000 mg Oral Q4H PRN    melatonin tab 3 mg  3 mg Oral Nightly PRN    glycerin-hypromellose- (Artifical Tears) 0.2-0.2-1 % ophthalmic solution 1 drop  1 drop Both Eyes QID PRN    sodium chloride (Saline Mist) 0.65 % nasal solution 1 spray  1 spray Each Nare Q3H PRN    enoxaparin (Lovenox) 40 MG/0.4ML SUBQ injection 40 mg  40 mg Subcutaneous Daily    ondansetron (Zofran) 4 MG/2ML injection 4 mg  4 mg Intravenous Q6H PRN    prochlorperazine (Compazine) 10 MG/2ML injection 5 mg  5 mg Intravenous Q8H PRN    polyethylene glycol (PEG 3350) (Miralax) 17 g oral packet 17 g  17 g Oral Daily PRN     sennosides (Senokot) tab 17.2 mg  17.2 mg Oral Nightly PRN    bisacodyl (Dulcolax) 10 MG rectal suppository 10 mg  10 mg Rectal Daily PRN    fleet enema (Fleet) 7-19 GM/118ML rectal enema 133 mL  1 enema Rectal Once PRN     Allergies:   Allergies   Allergen Reactions    Seasonal      Social HX:   Social History     Socioeconomic History    Marital status:    Tobacco Use    Smoking status: Former     Packs/day: 1.00     Years: 18.00     Additional pack years: 0.00     Total pack years: 18.00     Types: Cigarettes     Quit date: 10/1/2004     Years since quittin.4    Smokeless tobacco: Former     Types: Chew     Quit date: 1985   Substance and Sexual Activity    Alcohol use: Yes     Alcohol/week: 6.0 standard drinks of alcohol     Types: 3 Cans of beer, 3 Standard drinks or equivalent per week     Comment: 3-4drinks/wk    Drug use: No    Sexual activity: Yes     Partners: Female   Other Topics Concern    Caffeine Concern Yes     Comment: 3x/day    Exercise Yes     Comment: walk, bike, run weights     Social Determinants of Health     Food Insecurity: No Food Insecurity (3/3/2024)    Food Insecurity     Food Insecurity: Never true   Transportation Needs: No Transportation Needs (3/3/2024)    Transportation Needs     Lack of Transportation: No   Housing Stability: Low Risk  (3/3/2024)    Housing Stability     Housing Instability: No      FamHx: The patient has no family history of colon cancer or other gastrointestinal malignancies;  No family history of ulcer disease, or inflammatory bowel disease  ROS:  In addition to the pertinent positives described above:            Infectious Disease:  No chronic infections or recent fevers prior to the acute illness            Cardiovascular: No history of CAD, prior MI, chest pain, or palpitations            Respiratory: No shortness of breath, asthma, copd, recurrent pneumonia            Hematologic: The patient reports no easy bruising, frequent gum bleeding or  nose bleeding;  The patient has no history of known chronic anemia            Dermatologic: The patient reports no recent rashes or chronic skin disorders            Rheumatologic: The patient reports no history of chronic arthritis, myalgias, arthralgias            Genitourinary:  The patient reports no history of recurrent urinary tract infections, hematuria, dysuria, or nephrolithiasis           Psychiatric: The patient reports no history of depression, anxiety, suicidal ideation, or homicidal ideation           Oncologic: The patient reports no history of prior solid tumor or hematologic malignancy           ENT: The patient reports no hoarseness of voice, hearing loss, sinus congestion, tinnitus           Neurologic: The patient reports no history of seizure, stroke, or frequent headaches  PE: /82 (BP Location: Left arm)   Pulse 57   Temp 98.2 °F (36.8 °C) (Oral)   Resp 18   Ht 5' 6\" (1.676 m)   Wt 153 lb (69.4 kg)   SpO2 100%   BMI 24.69 kg/m²   Gen: AAO x 3, able to speak in complete sentences  HENT: EOMI, PERRL, oropharynx is clear with moist mucosal membranes  Eyes: Sclerae are anicteric  Neck:  Supple without nuchal rigidity  CV: Bradycardic, regular  Resp: Clear to auscultation bilaterally without wheezes; rubs, rhonchi, or rales  Abdomen: Soft, minimal upper abdominal tenderness, non-distended with the presence of bowel sounds; No hepatosplenomegaly; no rebound or guarding; No ascites is clinically apparent; no tympany to percussion  Ext: No peripheral edema or cyanosis  Skin: Warm and dry  Psychiatric: Appropriate mood and congruent affect without obvious depression or anxiety  Labs:   Lab Results   Component Value Date    WBC 5.8 03/05/2024    HGB 14.9 03/05/2024    HCT 45.4 03/05/2024    .0 03/05/2024    CREATSERUM 0.98 03/05/2024    BUN 11 03/05/2024     03/05/2024    K 3.6 03/05/2024     03/05/2024    CO2 22.0 03/05/2024    GLU 88 03/05/2024    CA 9.1 03/05/2024     ALB 3.0 03/05/2024    ALKPHO 154 03/05/2024    BILT 1.0 03/05/2024     03/05/2024     03/05/2024     03/05/2024     Recent Labs   Lab 03/03/24  1803 03/04/24  0527 03/05/24  0538   * 101* 88   BUN 20 12 11   CREATSERUM 1.09 0.90 0.98   CA 10.1 9.1 9.1    142 141   K 3.6 3.8 3.6    114* 110   CO2 31.0 24.0 22.0     Recent Labs   Lab 03/05/24  0538   RBC 5.16   HGB 14.9   HCT 45.4   MCV 88.0   MCH 28.9   MCHC 32.8   RDW 12.7   NEPRELIM 3.87   WBC 5.8   .0       Recent Labs   Lab 03/03/24  1803 03/04/24  0527 03/05/24  0538   ALT 1,969* 1,505* 875*   AST 2,280* 991* 294*       Imaging:   ROCEDURE:  MRI ABDOMEN&MRCP W/3D (W+W0)(CPT=74183/01589)     COMPARISON:  [facility] , CT, CT ABDOMEN+PELVIS(CONTRAST ONLY)(CPT=74177), 3/03/2024, 7:04 PM.     INDICATIONS:  pancreatitis     TECHNIQUE:  Multiplanar single shot fast spin echo, chemical shift imaging, breath hold T2 weighted images and 2-D fiesta sequences were acquired. Fluid sensitive imaging with MRCP reconstruction was also performed including 3D multi-projection imaging  (non independent workstation).  Both projection and source MRCP images are evaluated.  Subsequent post contrast imaging was performed after intravenous administration of paramagnetic contrast agent.     3-D RENDERING:  Additional 3-D rendering was generated by the technologist.     PATIENT STATED HISTORY:(As transcribed by Technologist)  Patient complains of right upper quadrant and epigastric pain radiating to the upper back since yesterday.      CONTRAST USED:  14mL of Dotarem     FINDINGS:    LIVER:  No enlargement, atrophy, abnormal density, or significant focal lesion.    BILIARY:  The gallbladder is dilated measuring up to 10 centimeters.  No intraluminal filling defects to suggest cholelithiasis.  The common bile duct is measuring up to 1 cm with abrupt narrowing within the midportion (series 7, image 18) tapering to  approximately 3 millimeters at  this site.  No enhancing lesion or luminal filling defect is noted at this location.  PANCREAS:  No fluid collection or enhancing lesion.  The main duct is mildly prominent to 5 millimeters.  There are findings suggestive of pancreas divisum.  SPLEEN:  No enlargement or focal lesion.    KIDNEYS:  No mass or obstruction.    ADRENALS:  No mass or enlargement.    AORTA/VASCULAR:  No aneurysm or dissection.    RETROPERITONEUM:  No mass or adenopathy.    BOWEL/MESENTERY:  No visible mass, obstruction, or bowel wall thickening.    ABDOMINAL WALL:  No mass or hernia.    BONES:  No bony lesion or fracture.    LUNG BASES:  No visible pleural disease.  Lung bases not well assessed with MRI.    OTHER:  Negative.        Impression   CONCLUSION:    1. Dilated gallbladder with trace pericholecystic fluid, correlation for acute cholecystitis is recommended.  2. Dilated common bile duct to 1 cm in diameter with abrupt tapering within the mid to distal segment.  No enhancing lesion identified at this site.  Findings may be related to a benign stricture, though further evaluation with ERCP is recommended.  3. Findings suggestive of pancreas divisum.  There is mild prominence of the main duct measuring up to 5 millimeters.  No peripancreatic fluid collections or enhancing lesions.     LOCATION:  [facility]    Dictated by (CST): Hemanth Pressley MD on 3/04/2024 at 11:53 PM      Finalized by (CST): Hemanth Pressley MD on 3/05/2024 at 0:06 AM   _____________________________________________________  PROCEDURE:  CT ABDOMEN+PELVIS (CONTRAST ONLY) (CPT=74177)     COMPARISON:  [facility], US ABDOMEN COMPLETE (CPT=76700), 3/03/2024, 6:12 PM.     INDICATIONS:  RUQ abdominal pain     TECHNIQUE:  CT scanning was performed from the dome of the diaphragm to the pubic symphysis with non-ionic intravenous contrast material. Post contrast coronal MPR imaging was performed.  Dose reduction techniques were used. Dose information is  transmitted to the ACR  (American College of Radiology) NRDR (National Radiology Data Registry) which includes the Dose Index Registry.     PATIENT STATED HISTORY:(As transcribed by Technologist)  Pt here due to RUQ pain that started last week, it went away and then came back this morning with back pain also. Pt states that he has no change in urine or bowl habits.        CONTRAST USED:  100cc of Isovue 370     FINDINGS:    LIVER:  No enlargement, atrophy, abnormal density, or significant focal lesion.  Periportal edema is noted.  BILIARY:  Gallbladder is distended.  Common bile duct is dilated measuring up to 13 mm.  The distal duct appears to be obstructed.  This difficult to delineate.  This is adjacent to the duodenum.  The pancreatic duct is unremarkable.  PANCREAS:  No lesion, fluid collection, ductal dilatation, or atrophy.    SPLEEN:  No enlargement or focal lesion.    KIDNEYS:  No mass, obstruction, or calcification.    ADRENALS:  No mass or enlargement.    AORTA/VASCULAR:  No aneurysm or dissection.    RETROPERITONEUM:  No mass or adenopathy.    BOWEL/MESENTERY:  No visible mass, obstruction, or bowel wall thickening.    ABDOMINAL WALL:  No mass or hernia.    URINARY BLADDER:  Distended bladder.  PELVIC NODES:  No adenopathy.    PELVIC ORGANS:  No visible mass.  Pelvic organs appropriate for patient age.    BONES:  No bony lesion or fracture.    LUNG BASES:  No visible pulmonary or pleural disease.    OTHER:  Negative.       Impression   CONCLUSION:       1. Distended gallbladder is noted.  Common bile duct proximally is dilated measuring 13 mm.  Distal common bile duct may be obstructed.  A definite lesion is not identified.  A follow-up MRCP may be done.  Follow-up MRI abdomen examination with and  without contrast would also be helpful.     2. Markedly distended bladder.          LOCATION:  [facility]        Dictated by (CST): Nam Peñaloza MD on 3/03/2024 at 8:55 PM      Finalized by (CST): Nam Peñaloza MD on 3/03/2024  at 8:59 PM      Impression: 53-year-old male with no significant past medical history admitted 3/3 with abdominal pain found to have acute pancreatitis thought to be biliary in origin given marked LFT elevation on arrival. MRI abdomen/MRCP (3/4) suggests dilated gallbladder with trace pericholecystic fluid, dilated CBD to 1 cm with abrupt tapering within the mid to distal segment, and findings suggestive of pancreatic divisum with a mild prominence of the main pancreatic duct measuring 5 mm.  Case discussed and reviewed with interventional GI (Dr. Galarza) recommend EUS with ERCP in a.m.  The risks, benefits, alternatives of the procedure including the risks of anesthesia, bleeding, perforation, missed lesions, need for surgery, infection, and acute pancreatitis were discussed with the patient. He expressed understanding of the risks and was agreeable to proceed.    Recommendations:     EUS/ERCP under MAC in a.m. with Dr. Michell Mcgee to continue diet today; n.p.o. at midnight with sips of water for necessary medications  Pain management per hospitalist recommendations; antiemetics as needed  CA 19-9, autoimmune serology, iron studies, viral hepatitis serology to blood in lab  Continue to increase activity as tolerated; I-S hourly while awake, DVT prophylaxis per hospitalist recommendations  CBC, CMP, magnesium in a.m. correcting electrolytes per hospitalist recommendations    Thank you for the consultation, we will follow the patient with you.  Attending addendum (Dr MASON Bonds) to follow later today and provide formal, final recommendations at that time   DARYN Gatica  2:18 PM  3/5/2024  [facility]  [phone]    GI attending addendum:    I have personally seen and examined this patient and agree with above nurse practitioner's assessment and recommendations.     Briefly, patient is a 53-year-old male admitted on March 3, 2024 and found to have acute pancreatitis.  We were consulted  today as MRCP was performed concerning for distal common bile duct stricture as well as pancreatic divisum.  He was noted to have significant elevations in liver enzymes on admission which have been downtrending.  On physical exam, patient was standing up in room.  Abdomen is soft, nontender, and nondistended.  Patient with acute pancreatitis possibly secondary to biliary stricture or may be pancreatic divisum.  Could have also had passed stone.  Denies significant alcohol use.  Will plan for EUS and ERCP tomorrow with Dr. Galarza.  Pain management per primary.  Okay to continue diet today and n.p.o. after midnight.  Serologic workup as above for elevated liver enzymes to rule out additional etiologies.  Thank you for the consultation.  Will continue to follow along with you.    Terry Bonds,   9:30 PM  3/5/2024  [facility]  [phone]

## 2024-03-05 NOTE — PROGRESS NOTES
[facility]   part of [facility]     Hospitalist Progress Note     Mateo Byrd Patient Status:  Inpatient    1970 MRN [id number]   Location [facility] 3NW-A Attending Rene Walker MD   Hosp Day # 2 PCP ELIEL KINGSLEY DO     Chief Complaint: abd pain    Subjective:     Patient improved    Objective:    Review of Systems:   A comprehensive review of systems was completed; pertinent positive and negatives stated in subjective.    Vital signs:  Temp:  [98 °F (36.7 °C)-98.4 °F (36.9 °C)] 98.2 °F (36.8 °C)  Pulse:  [57-70] 57  Resp:  [18] 18  BP: (128-133)/(74-82) 128/82  SpO2:  [99 %-100 %] 100 %    Physical Exam:    General: No acute distress  Respiratory: No wheezes, no rhonchi  Cardiovascular: S1, S2, regular rate and rhythm  Abdomen: Soft, RUQ-tender, non-distended, positive bowel sounds  Neuro: No new focal deficits.   Extremities: No edema      Diagnostic Data:    Labs:  Recent Labs   Lab 24  0524  0538   WBC 6.5 5.4 5.8   HGB 16.3 14.6 14.9   MCV 85.8 85.7 88.0   .0 200.0 202.0       Recent Labs   Lab 24  0527 24  0538   * 101* 88   BUN 20 12 11   CREATSERUM 1.09 0.90 0.98   CA 10.1 9.1 9.1   ALB 3.9 3.1* 3.0*    142 141   K 3.6 3.8 3.6    114* 110   CO2 31.0 24.0 22.0   ALKPHO 183* 175* 154*   AST 2,280* 991* 294*   ALT 1,969* 1,505* 875*   BILT 1.8 1.0 1.0   TP 7.3 5.8* 5.7*       Estimated Creatinine Clearance: 76.9 mL/min (based on SCr of 0.98 mg/dL).    No results for input(s): \"TROP\", \"TROPHS\", \"CK\" in the last 168 hours.    No results for input(s): \"PTP\", \"INR\" in the last 168 hours.               Microbiology    No results found for this visit on 24.      Imaging: Reviewed in Epic.    Medications:    enoxaparin  40 mg Subcutaneous Daily       Assessment & Plan:      #Acute Pancreatitis, likely GB induced  #pancreatic divisum possible  #possible CBD stricture  IVF  NPO  Pain  control  MRCP noted  Gen surg - outpt denisa  Consult GI for ERCP/EUS tomorrow     #Transaminase elevation  Due to above  IVF  Monitor  -improving     #Distended Bladder  Asymptomatic  Likely due to BPH  Flomax when able  PVR      eRne Walker MD    Supplementary Documentation:     Quality:  DVT Mechanical Prophylaxis:     Early ambuation  DVT Pharmacologic Prophylaxis   Medication    enoxaparin (Lovenox) 40 MG/0.4ML SUBQ injection 40 mg                Code Status: Not on file  Dominguez: No urinary catheter in place  Dominguez Duration (in days):   Central line:    DESTINY: 3/6/2024    Discharge is dependent on: course  At this point Mr. Byrd is expected to be discharge to: home    The 21st Century Cures Act makes medical notes like these available to patients in the interest of transparency. Please be advised this is a medical document. Medical documents are intended to carry relevant information, facts as evident, and the clinical opinion of the practitioner. The medical note is intended as peer to peer communication and may appear blunt or direct. It is written in medical language and may contain abbreviations or verbiage that are unfamiliar.

## 2024-03-05 NOTE — ANESTHESIA PREPROCEDURE EVALUATION
PRE-OP EVALUATION    Patient Name: Mateo Byrd    Admit Diagnosis: Elevated liver enzymes [R74.8]  Abdominal pain, acute [R10.9]  Dilated cbd, acquired [K83.8]  Acute pancreatitis, unspecified complication status, unspecified pancreatitis type (HCC) [K85.90]    Pre-op Diagnosis: inpatient    ENDOSCOPIC ULTRASOUND (EUS), ENDOSCOPIC RETROGRADE CHOLANGIOPANCREATOGRAPHY (ERCP)    Anesthesia Procedure: ENDOSCOPIC ULTRASOUND (EUS), ENDOSCOPIC RETROGRADE CHOLANGIOPANCREATOGRAPHY (ERCP)  ENDOSCOPIC RETROGRADE CHOLANGIOPANCREATOGRAPHY (ERCP)    Surgeon(s) and Role:     * Gene Galarza MD - Primary    Pre-op vitals reviewed.  Temp: 98.1 °F (36.7 °C)  Pulse: 50  Resp: 18  BP: 126/78  SpO2: 100 %  Body mass index is 24.69 kg/m².    Current medications reviewed.  Hospital Medications:   [COMPLETED] gadoterate meglumine (Dotarem) 7.5 MMOL/15ML injection 15 mL  15 mL Intravenous ONCE PRN    butalbital-acetaminophen-caffeine (Fioricet) -40 MG per tab 1 tablet  1 tablet Oral Q4H PRN    [COMPLETED] iopamidol 76% (ISOVUE-370) injection for power injector  85 mL Intravenous ONCE PRN    [COMPLETED] sodium chloride 0.9 % IV bolus 1,000 mL  1,000 mL Intravenous Once    [COMPLETED] sodium chloride 0.9% infusion 1,000 mL  1,000 mL Intravenous Once    [COMPLETED] HYDROmorphone (Dilaudid) 1 MG/ML injection 0.5 mg  0.5 mg Intravenous Once    lactated ringers infusion   Intravenous Continuous    HYDROmorphone (Dilaudid) 1 MG/ML injection 0.2 mg  0.2 mg Intravenous Q2H PRN    Or    HYDROmorphone (Dilaudid) 1 MG/ML injection 0.4 mg  0.4 mg Intravenous Q2H PRN    Or    HYDROmorphone (Dilaudid) 1 MG/ML injection 0.8 mg  0.8 mg Intravenous Q2H PRN    ondansetron (Zofran) 4 MG/2ML injection 4 mg  4 mg Intravenous Q6H PRN    acetaminophen (Tylenol Extra Strength) tab 1,000 mg  1,000 mg Oral Q4H PRN    melatonin tab 3 mg  3 mg Oral Nightly PRN    glycerin-hypromellose- (Artifical Tears) 0.2-0.2-1 % ophthalmic solution 1 drop   1 drop Both Eyes QID PRN    sodium chloride (Saline Mist) 0.65 % nasal solution 1 spray  1 spray Each Nare Q3H PRN    enoxaparin (Lovenox) 40 MG/0.4ML SUBQ injection 40 mg  40 mg Subcutaneous Daily    ondansetron (Zofran) 4 MG/2ML injection 4 mg  4 mg Intravenous Q6H PRN    prochlorperazine (Compazine) 10 MG/2ML injection 5 mg  5 mg Intravenous Q8H PRN    polyethylene glycol (PEG 3350) (Miralax) 17 g oral packet 17 g  17 g Oral Daily PRN    sennosides (Senokot) tab 17.2 mg  17.2 mg Oral Nightly PRN    bisacodyl (Dulcolax) 10 MG rectal suppository 10 mg  10 mg Rectal Daily PRN    fleet enema (Fleet) 7-19 GM/118ML rectal enema 133 mL  1 enema Rectal Once PRN       Outpatient Medications:     Medications Prior to Admission   Medication Sig Dispense Refill Last Dose    raNITIdine HCl (ZANTAC OR) Inject as directed.   3/3/2024    Zinc 100 MG Oral Tab    3/2/2024    TURMERIC CURCUMIN OR    3/2/2024    Cholecalciferol (VITAMIN D) 25 MCG (1000 UT) Oral Tab    3/2/2024    Lactobacillus (PROBIOTIC ACIDOPHILUS) Oral Cap Take by mouth.   3/3/2024    Multiple Vitamins-Minerals (MULTIVITAMIN OR) Take 1 tablet by mouth daily.      3/3/2024    Omega-3 Fatty Acids (FISH OIL) 1200 MG Oral Cap Take 1 capsule by mouth daily.   3/2/2024    PEG 3350-KCl-Na Bicarb-NaCl 420 g Oral Recon Soln Take as directed by physician (Patient not taking: Reported on 3/3/2024) 4000 mL 0     Loratadine 10 MG Oral Cap Take 1 capsule by mouth daily as needed.   Unknown       Allergies: Seasonal      Anesthesia Evaluation    Patient summary reviewed.    Anesthetic Complications  (-) history of anesthetic complications         GI/Hepatic/Renal                                 Cardiovascular    Negative cardiovascular ROS.                                                   Endo/Other    Negative endo/other ROS.                              Pulmonary    Negative pulmonary ROS.                       Neuro/Psych    Negative neuro/psych ROS.                           Abdominal pain, acute Acute pancreatitis, unspecified complication status, unspecified pancreatitis type (HCC)  Benign neoplasm of ascending colon Benign neoplasm of cecum  Chronic prostatitis Dilated cbd, acquired  Elevated liver enzymes Family history of colonic polyps  Special screening for malignant neoplasm of colon             Past Surgical History:   Procedure Laterality Date    KNEE REPLACEMENT SURGERY      OTHER SURGICAL HISTORY  [id number]    left knee/\"removed a band\"     Social History     Socioeconomic History    Marital status:    Tobacco Use    Smoking status: Former     Packs/day: 1.00     Years: 18.00     Additional pack years: 0.00     Total pack years: 18.00     Types: Cigarettes     Quit date: 10/1/2004     Years since quittin.4    Smokeless tobacco: Former     Types: Chew     Quit date: 1985   Substance and Sexual Activity    Alcohol use: Yes     Alcohol/week: 6.0 standard drinks of alcohol     Types: 3 Cans of beer, 3 Standard drinks or equivalent per week     Comment: 3-4drinks/wk    Drug use: No    Sexual activity: Yes     Partners: Female   Other Topics Concern    Caffeine Concern Yes     Comment: 3x/day    Exercise Yes     Comment: walk, bike, run weights     History   Drug Use No     Available pre-op labs reviewed.  Lab Results   Component Value Date    WBC 5.0 2024    RBC 5.31 2024    HGB 15.8 2024    HCT 46.2 2024    MCV 87.0 2024    MCH 29.8 2024    MCHC 34.2 2024    RDW 12.7 2024    .0 2024     Lab Results   Component Value Date     2024    K 4.4 2024     2024    CO2 28.0 2024    BUN 10 2024    CREATSERUM 1.04 2024     (H) 2024    CA 9.7 2024            Airway      Mallampati: I  Mouth opening: >3 FB  TM distance: > 6 cm  Neck ROM: full Cardiovascular    Cardiovascular exam normal.         Dental    Dentition appears grossly intact          Pulmonary    Pulmonary exam normal.                 Other findings              ASA: 2   Plan: MAC  NPO status verified and patient meets guidelines.        Comment: Plan is MAC anesthesia, which may include deep sedation.  Implied that memory of procedure is unlikely although intraop recall, if it occurs, may be a reasonable and comfortable experience with this anesthetic.  Aware that general anesthesia is not intended though necessary care may involve deep sedation with transient moments of general anesthesia.  The backup plan for safe patient care may require change of plan to full general anesthesia with potential airway placement and attendant risks.  Patient (legal guardian) demonstrated understanding, accepted risks and benefits, and wishes to proceed with MAC anesthesia as reflected in the signed consent form.    Plan/risks discussed with: patient                Present on Admission:  **None**

## 2024-03-05 NOTE — PROGRESS NOTES
A&Ox4. VSS. RA.  GI: Abdomen soft, nondistended.   Denies nausea.  : Voids.  Pain controlled with PRN pain medications  Up ad bossman.  Diet:tolerating general diet  IVF running per order.  All appropriate safety measures in place. All questions and concerns addressed. Will continue to monitor

## 2024-03-06 ENCOUNTER — APPOINTMENT (OUTPATIENT)
Dept: GENERAL RADIOLOGY | Facility: HOSPITAL | Age: 54
End: 2024-03-06
Attending: INTERNAL MEDICINE
Payer: COMMERCIAL

## 2024-03-06 ENCOUNTER — ANESTHESIA (OUTPATIENT)
Dept: ENDOSCOPY | Facility: HOSPITAL | Age: 54
End: 2024-03-06
Payer: COMMERCIAL

## 2024-03-06 LAB
ACTIN SMOOTH MUSCLE AB: 4 UNITS
ALBUMIN SERPL-MCNC: 3.3 G/DL (ref 3.4–5)
ALBUMIN/GLOB SERPL: 1.1 {RATIO} (ref 1–2)
ALP LIVER SERPL-CCNC: 148 U/L
ALT SERPL-CCNC: 695 U/L
ANION GAP SERPL CALC-SCNC: 3 MMOL/L (ref 0–18)
AST SERPL-CCNC: 123 U/L (ref 15–37)
BASOPHILS # BLD AUTO: 0.04 X10(3) UL (ref 0–0.2)
BASOPHILS NFR BLD AUTO: 0.8 %
BILIRUB SERPL-MCNC: 0.7 MG/DL (ref 0.1–2)
BUN BLD-MCNC: 10 MG/DL (ref 9–23)
CALCIUM BLD-MCNC: 9.7 MG/DL (ref 8.5–10.1)
CHLORIDE SERPL-SCNC: 112 MMOL/L (ref 98–112)
CO2 SERPL-SCNC: 28 MMOL/L (ref 21–32)
CREAT BLD-MCNC: 1.04 MG/DL
DSDNA IGG SERPL IA-ACNC: 2.9 IU/ML
EGFRCR SERPLBLD CKD-EPI 2021: 85 ML/MIN/1.73M2 (ref 60–?)
ENA AB SER QL IA: 0.1 UG/L
ENA AB SER QL IA: NEGATIVE
EOSINOPHIL # BLD AUTO: 0.22 X10(3) UL (ref 0–0.7)
EOSINOPHIL NFR BLD AUTO: 4.4 %
ERYTHROCYTE [DISTWIDTH] IN BLOOD BY AUTOMATED COUNT: 12.7 %
GLOBULIN PLAS-MCNC: 3 G/DL (ref 2.8–4.4)
GLUCOSE BLD-MCNC: 124 MG/DL (ref 70–99)
HCT VFR BLD AUTO: 46.2 %
HGB BLD-MCNC: 15.8 G/DL
IMM GRANULOCYTES # BLD AUTO: 0.01 X10(3) UL (ref 0–1)
IMM GRANULOCYTES NFR BLD: 0.2 %
LYMPHOCYTES # BLD AUTO: 1.11 X10(3) UL (ref 1–4)
LYMPHOCYTES NFR BLD AUTO: 22.1 %
M2 MITOCHONDRIAL AB: <20 UNITS
MAGNESIUM SERPL-MCNC: 2.1 MG/DL (ref 1.6–2.6)
MCH RBC QN AUTO: 29.8 PG (ref 26–34)
MCHC RBC AUTO-ENTMCNC: 34.2 G/DL (ref 31–37)
MCV RBC AUTO: 87 FL
MONOCYTES # BLD AUTO: 0.42 X10(3) UL (ref 0.1–1)
MONOCYTES NFR BLD AUTO: 8.4 %
NEUTROPHILS # BLD AUTO: 3.22 X10 (3) UL (ref 1.5–7.7)
NEUTROPHILS # BLD AUTO: 3.22 X10(3) UL (ref 1.5–7.7)
NEUTROPHILS NFR BLD AUTO: 64.1 %
OSMOLALITY SERPL CALC.SUM OF ELEC: 296 MOSM/KG (ref 275–295)
PLATELET # BLD AUTO: 205 10(3)UL (ref 150–450)
POTASSIUM SERPL-SCNC: 4.4 MMOL/L (ref 3.5–5.1)
PROT SERPL-MCNC: 6.3 G/DL (ref 6.4–8.2)
RBC # BLD AUTO: 5.31 X10(6)UL
SODIUM SERPL-SCNC: 143 MMOL/L (ref 136–145)
WBC # BLD AUTO: 5 X10(3) UL (ref 4–11)

## 2024-03-06 PROCEDURE — 0F798DZ DILATION OF COMMON BILE DUCT WITH INTRALUMINAL DEVICE, VIA NATURAL OR ARTIFICIAL OPENING ENDOSCOPIC: ICD-10-PCS | Performed by: INTERNAL MEDICINE

## 2024-03-06 PROCEDURE — 0FDG8ZX EXTRACTION OF PANCREAS, VIA NATURAL OR ARTIFICIAL OPENING ENDOSCOPIC, DIAGNOSTIC: ICD-10-PCS | Performed by: INTERNAL MEDICINE

## 2024-03-06 PROCEDURE — 74328 X-RAY BILE DUCT ENDOSCOPY: CPT | Performed by: INTERNAL MEDICINE

## 2024-03-06 PROCEDURE — BF10YZZ FLUOROSCOPY OF BILE DUCTS USING OTHER CONTRAST: ICD-10-PCS | Performed by: INTERNAL MEDICINE

## 2024-03-06 PROCEDURE — 99232 SBSQ HOSP IP/OBS MODERATE 35: CPT | Performed by: HOSPITALIST

## 2024-03-06 DEVICE — REPLAY HEMOSTASIS CLIP, 11MM SPAN
Type: IMPLANTABLE DEVICE
Brand: REPLAY

## 2024-03-06 DEVICE — BILIARY STENT WITH NAVIFLEXTM RX DELIVERY SYSTEM
Type: IMPLANTABLE DEVICE | Site: BILIARY | Status: FUNCTIONAL
Brand: ADVANIX™ BILIARY

## 2024-03-06 RX ORDER — KETOROLAC TROMETHAMINE 30 MG/ML
30 INJECTION, SOLUTION INTRAMUSCULAR; INTRAVENOUS ONCE
Status: COMPLETED | OUTPATIENT
Start: 2024-03-06 | End: 2024-03-06

## 2024-03-06 RX ORDER — SODIUM CHLORIDE, SODIUM LACTATE, POTASSIUM CHLORIDE, CALCIUM CHLORIDE 600; 310; 30; 20 MG/100ML; MG/100ML; MG/100ML; MG/100ML
INJECTION, SOLUTION INTRAVENOUS CONTINUOUS
Status: DISCONTINUED | OUTPATIENT
Start: 2024-03-06 | End: 2024-03-06

## 2024-03-06 RX ORDER — HYDROCODONE BITARTRATE AND ACETAMINOPHEN 5; 325 MG/1; MG/1
1 TABLET ORAL EVERY 6 HOURS PRN
Status: DISCONTINUED | OUTPATIENT
Start: 2024-03-06 | End: 2024-03-07

## 2024-03-06 RX ORDER — GLYCOPYRROLATE 0.2 MG/ML
INJECTION, SOLUTION INTRAMUSCULAR; INTRAVENOUS AS NEEDED
Status: DISCONTINUED | OUTPATIENT
Start: 2024-03-06 | End: 2024-03-06 | Stop reason: SURG

## 2024-03-06 RX ORDER — SODIUM CHLORIDE, SODIUM LACTATE, POTASSIUM CHLORIDE, CALCIUM CHLORIDE 600; 310; 30; 20 MG/100ML; MG/100ML; MG/100ML; MG/100ML
INJECTION, SOLUTION INTRAVENOUS CONTINUOUS
Status: DISCONTINUED | OUTPATIENT
Start: 2024-03-06 | End: 2024-03-07

## 2024-03-06 RX ORDER — HYDROMORPHONE HYDROCHLORIDE 1 MG/ML
INJECTION, SOLUTION INTRAMUSCULAR; INTRAVENOUS; SUBCUTANEOUS
Status: COMPLETED
Start: 2024-03-06 | End: 2024-03-06

## 2024-03-06 RX ORDER — HYDROCODONE BITARTRATE AND ACETAMINOPHEN 5; 325 MG/1; MG/1
1 TABLET ORAL EVERY 6 HOURS PRN
Qty: 30 TABLET | Refills: 0 | Status: SHIPPED | OUTPATIENT
Start: 2024-03-06

## 2024-03-06 RX ORDER — NALOXONE HYDROCHLORIDE 0.4 MG/ML
80 INJECTION, SOLUTION INTRAMUSCULAR; INTRAVENOUS; SUBCUTANEOUS AS NEEDED
Status: DISCONTINUED | OUTPATIENT
Start: 2024-03-06 | End: 2024-03-06 | Stop reason: HOSPADM

## 2024-03-06 RX ORDER — MIDAZOLAM HYDROCHLORIDE 1 MG/ML
INJECTION INTRAMUSCULAR; INTRAVENOUS AS NEEDED
Status: DISCONTINUED | OUTPATIENT
Start: 2024-03-06 | End: 2024-03-06 | Stop reason: SURG

## 2024-03-06 RX ORDER — LIDOCAINE HYDROCHLORIDE 10 MG/ML
INJECTION, SOLUTION EPIDURAL; INFILTRATION; INTRACAUDAL; PERINEURAL AS NEEDED
Status: DISCONTINUED | OUTPATIENT
Start: 2024-03-06 | End: 2024-03-06 | Stop reason: SURG

## 2024-03-06 RX ADMIN — GLYCOPYRROLATE 0.4 MG: 0.2 INJECTION, SOLUTION INTRAMUSCULAR; INTRAVENOUS at 08:05:00

## 2024-03-06 RX ADMIN — LIDOCAINE HYDROCHLORIDE 50 MG: 10 INJECTION, SOLUTION EPIDURAL; INFILTRATION; INTRACAUDAL; PERINEURAL at 07:57:00

## 2024-03-06 RX ADMIN — MIDAZOLAM HYDROCHLORIDE 2 MG: 1 INJECTION INTRAMUSCULAR; INTRAVENOUS at 07:56:00

## 2024-03-06 RX ADMIN — SODIUM CHLORIDE, SODIUM LACTATE, POTASSIUM CHLORIDE, CALCIUM CHLORIDE: 600; 310; 30; 20 INJECTION, SOLUTION INTRAVENOUS at 07:51:00

## 2024-03-06 RX ADMIN — LIDOCAINE HYDROCHLORIDE 50 MG: 10 INJECTION, SOLUTION EPIDURAL; INFILTRATION; INTRACAUDAL; PERINEURAL at 07:56:00

## 2024-03-06 RX ADMIN — SODIUM CHLORIDE, SODIUM LACTATE, POTASSIUM CHLORIDE, CALCIUM CHLORIDE: 600; 310; 30; 20 INJECTION, SOLUTION INTRAVENOUS at 08:31:00

## 2024-03-06 NOTE — PLAN OF CARE
Pt VSS,  AOx4. Pt up ad bossman, steady gait. Tolerated reg diet for dinner with no pain. NPO at midnight for procedure tomorrow. Consent signed in day shift on paper chart. Pt reports BM today, formed/\"normal\". Voids in adequate amounts without difficulty. Pt on room air; denies BURTON/SOB/lightheadedness. Fall & safety precautions in place. POC discussed.

## 2024-03-06 NOTE — ANESTHESIA POSTPROCEDURE EVALUATION
[facility]    Mateo Byrd Patient Status:  Inpatient   Age/Gender 53 year old male MRN [id number]   Location [facility] Attending Rene Walker MD   Hosp Day # 3 PCP ELIEL KINGSLEY,        Anesthesia Post-op Note    ENDOSCOPIC ULTRASOUND (EUS) WITH FINE NEEDLE BIOPSIES , ENDOSCOPIC RETROGRADE CHOLANGIOPANCREATOGRAPHY (ERCP)    Procedure Summary       Date: 03/06/24 Room / Location:  ENDOSCOPY 02 / [facility]    Anesthesia Start: 0751 Anesthesia Stop: 0831    Procedures:       ENDOSCOPIC ULTRASOUND (EUS) WITH FINE NEEDLE BIOPSIES , ENDOSCOPIC RETROGRADE CHOLANGIOPANCREATOGRAPHY (ERCP)      ENDOSCOPIC RETROGRADE CHOLANGIOPANCREATOGRAPHY (ERCP) Diagnosis: (PANCREAS HEAD MASS, BILE DUCT STRICTURE)    Surgeons: Gene Galarza MD Anesthesiologist: Chaka Bai MD    Anesthesia Type: MAC ASA Status: 2            Anesthesia Type: MAC    Vitals Value Taken Time   /94 03/06/24 0831   Temp  03/06/24 0832   Pulse 84 03/06/24 0831   Resp 18 03/06/24 0831   SpO2 99 % 03/06/24 0831   Vitals shown include unfiled device data.    Patient Location: Endoscopy    Anesthesia Type: MAC    Airway Patency: patent    Postop Pain Control: adequate    Mental Status: mildly sedated but able to meaningfully participate in the post-anesthesia evaluation    Nausea/Vomiting: none    Cardiopulmonary/Hydration status: stable euvolemic    Complications: no apparent anesthesia related complications    Postop vital signs: stable    Comments: The patient was responsive in a meaningful way and demonstrated a good airway. report to AMILCAR Wakefield.  Full report, identifications, history, procedure, anesthesia course, recovery expectations with chance for questions was provided to a responsible recovery RN from the endoscopy staff.        Dental Exam: Unchanged from Preop    Patient to be discharged from PACU when criteria met.

## 2024-03-06 NOTE — PROGRESS NOTES
[facility]  Progress Note    Mateo Byrd Patient Status:  Inpatient    1970 MRN [id number]   [facility] 3NW-A Attending Rene Walker MD   Hosp Day # 3 PCP ELIEL KINGSLEY, DO     Subjective:  Pt underwent EUS this AM. He is resuming clear liquids. Was tolerating soft diet yesterday without worsening abd pain or vomiting. He continues to endorse RUQ abd pain. No fevers.     Objective/Physical Exam:  General: Alert, orientated x3.  Cooperative.  No apparent distress.  Vital Signs:  Blood pressure 122/75, pulse 56, temperature 97.7 °F (36.5 °C), temperature source Oral, resp. rate 18, height 66\", weight 153 lb (69.4 kg), SpO2 94%.  Lungs: No respiratory distress.  Cardiac: Regular rate and rhythm.   Abdomen:  Soft, non distended, minimal RUQ tenderness, with no rebound or guarding.  No peritoneal signs.   Extremities:  No lower extremity edema noted.        Labs:  Lab Results   Component Value Date    WBC 5.0 2024    HGB 15.8 2024    HCT 46.2 2024    .0 2024     Lab Results   Component Value Date     2024    K 4.4 2024     2024    CO2 28.0 2024    BUN 10 2024    CREATSERUM 1.04 2024     2024    CA 9.7 2024    ALKPHO 148 2024     2024     2024    BILT 0.7 2024    ALB 3.3 2024    TP 6.3 2024     No results found for: \"PT\", \"INR\"    I/O last 3 completed shifts:  In: 7253 [P.O.:1440; I.V.:5813]  Out: 601 [Urine:600; Stool:1]  I/O this shift:  In: 1100 [I.V.:1100]  Out: 0     Assessment  Patient Active Problem List   Diagnosis    Chronic prostatitis    Special screening for malignant neoplasm of colon    Family history of colonic polyps    Benign neoplasm of cecum    Benign neoplasm of ascending colon    Acute pancreatitis, unspecified complication status, unspecified pancreatitis type (HCC)    Elevated liver enzymes    Dilated cbd, acquired     Abdominal pain, acute     Transaminitis  Pancreatitis  Common bile duct stricture      Plan:  EUS op note reviewed - pancreas head mass identified, biopsies pending  No acute plans for surgical intervention at time time. Defer further work up and management to GI for now  May resume diet from general surgery standpoint        Azalia Mejia PA-C  3/6/2024  12:52 PM

## 2024-03-06 NOTE — OPERATIVE REPORT
Mateo Byrd Patient Status:  Inpatient    1970 MRN [id number]   [facility] Attending Rene Walker MD   Date 3/6/2024 PCP ELIEL KINGSLEY DO     PREOPERATIVE DIAGNOSIS/INDICATION: Abnormal LFT's, pancreatitis, abnormal MRCP  POSTOPERTATIVE DIAGNOSIS: Pancreas head mass  PROCEDURE PERFORMED: EUS/EGD FNB  TIME OUT WAS PERFORMED    SEDATION: MAC sedation provided by General Anesthesia    INFORMED CONSENT: Risks, benefits and alternatives to the procedure were explained to the patient including but not limited to bleeding, infection, perforation, adverse drug reactions, pancreatitis and the need for hospitalization and surgery if this occurs, the patient understands and agrees to procedure.  PROCEDURE DESCRIPTION: The therapeutic side viewing echoendoscope was introduced into the patient’s mouth, hypo pharynx, esophagus, stomach and the first and second portion of the duodenum, straightening of the endoscope was performed to obtain a direct view of the major ampulla. Careful but limited examination of the above described areas was performed on withdrawal of the endoscope. The patient tolerated the procedure well and there were no immediate complications noted during the procedure, the patient was transported to the recovery area in stable condition.  FINDINGS:  DUODENUM: Normal  MAJOR AMPULLA: Normal  ECHO: Imaging was performed through the esophagus, stomach and duodenum. The visualized pancreatic parenchyma showed a subtle hypoechoic heterogeneous mass 1.8 cm in diameter, upstream from  this the CHD was dilated to 1 cm, the main PD was dilated upstream, from this and was preferentially draining through the minor ampulla, the GB was distended  THERAPEUTICS: FNB 22 G Acquire needle, three passes, multiple throws, preliminary in room cytopathology showed atypical cells, mild bleeding at duodenal puncture site was controlled with two endoclips  RECOMMENDATIONS:    Post EUS/FNBprecautions, watch for bleeding, infection, perforation, adverse drug reactions and pancreatitis.  CMP, CBCin AM.  Call status report post procedure.  Proceed with ERCP    Gene Galarza MD  3/6/2024  8:31 AM

## 2024-03-06 NOTE — PROGRESS NOTES
[facility]   part of [facility]     Hospitalist Progress Note     Mateo Byrd Patient Status:  Inpatient    1970 MRN [id number]   Location [facility] 3NW-A Attending Rene Walker MD   Hosp Day # 3 PCP ELIEL KINGSLEY DO     Chief Complaint: abd pain    Subjective:     Patient post op. Some pain w/ FLD    Objective:    Review of Systems:   A comprehensive review of systems was completed; pertinent positive and negatives stated in subjective.    Vital signs:  Temp:  [97.7 °F (36.5 °C)-98.3 °F (36.8 °C)] 97.7 °F (36.5 °C)  Pulse:  [] 56  Resp:  [18] 18  BP: (122-162)/() 122/75  SpO2:  [94 %-100 %] 94 %    Physical Exam:    General: No acute distress  Respiratory: No wheezes, no rhonchi  Cardiovascular: S1, S2, regular rate and rhythm  Abdomen: Soft, RUQ-tender, non-distended, positive bowel sounds  Neuro: No new focal deficits.   Extremities: No edema      Diagnostic Data:    Labs:  Recent Labs   Lab 24  1803 24  0527 24  0538 24  0612   WBC 6.5 5.4 5.8 5.0   HGB 16.3 14.6 14.9 15.8   MCV 85.8 85.7 88.0 87.0   .0 200.0 202.0 205.0       Recent Labs   Lab 24  0527 24  0538 24  0612   * 88 124*   BUN 12 11 10   CREATSERUM 0.90 0.98 1.04   CA 9.1 9.1 9.7   ALB 3.1* 3.0* 3.3*    141 143   K 3.8 3.6 4.4   * 110 112   CO2 24.0 22.0 28.0   ALKPHO 175* 154* 148*   * 294* 123*   ALT 1,505* 875* 695*   BILT 1.0 1.0 0.7   TP 5.8* 5.7* 6.3*       Estimated Creatinine Clearance: 72.4 mL/min (based on SCr of 1.04 mg/dL).    No results for input(s): \"TROP\", \"TROPHS\", \"CK\" in the last 168 hours.    No results for input(s): \"PTP\", \"INR\" in the last 168 hours.               Microbiology    No results found for this visit on 24.      Imaging: Reviewed in Epic.    Medications:    enoxaparin  40 mg Subcutaneous Daily       Assessment & Plan:      #Acute Pancreatitis, likely GB induced  #pancreatic divisum  possible  #possible CBD stricture  Pain control  MRCP noted  Gen surg - outpt denisa  Consult GI   S/p ERCP/EUS w/ small mass biopsied. Stent in CBD placed. D/w patient  D/w GI  ADAT. Had pain w/ FLD. Start norco     #Transaminase elevation  Due to above  IVF  Monitor  -improving     #Distended Bladder  Asymptomatic  Likely due to BPH  Flomax when able    POC: If tolerates diet. Ok to DC home      Rene Walker MD    Supplementary Documentation:     Quality:  DVT Mechanical Prophylaxis:     Early ambuation  DVT Pharmacologic Prophylaxis   Medication    enoxaparin (Lovenox) 40 MG/0.4ML SUBQ injection 40 mg                Code Status: Not on file  Dominguez: No urinary catheter in place  Dominguez Duration (in days):   Central line:    DESTINY: 3/6/2024    Discharge is dependent on: course  At this point Mr. Byrd is expected to be discharge to: home    The 21st Century Cures Act makes medical notes like these available to patients in the interest of transparency. Please be advised this is a medical document. Medical documents are intended to carry relevant information, facts as evident, and the clinical opinion of the practitioner. The medical note is intended as peer to peer communication and may appear blunt or direct. It is written in medical language and may contain abbreviations or verbiage that are unfamiliar.

## 2024-03-07 VITALS
DIASTOLIC BLOOD PRESSURE: 82 MMHG | WEIGHT: 153 LBS | BODY MASS INDEX: 24.59 KG/M2 | OXYGEN SATURATION: 99 % | TEMPERATURE: 99 F | RESPIRATION RATE: 18 BRPM | HEART RATE: 50 BPM | HEIGHT: 66 IN | SYSTOLIC BLOOD PRESSURE: 143 MMHG

## 2024-03-07 LAB
ALBUMIN SERPL-MCNC: 3 G/DL (ref 3.4–5)
ALBUMIN/GLOB SERPL: 1 {RATIO} (ref 1–2)
ALP LIVER SERPL-CCNC: 124 U/L
ALT SERPL-CCNC: 475 U/L
ANION GAP SERPL CALC-SCNC: 2 MMOL/L (ref 0–18)
AST SERPL-CCNC: 64 U/L (ref 15–37)
BILIRUB SERPL-MCNC: 0.6 MG/DL (ref 0.1–2)
BUN BLD-MCNC: 7 MG/DL (ref 9–23)
CALCIUM BLD-MCNC: 9.1 MG/DL (ref 8.5–10.1)
CHLORIDE SERPL-SCNC: 110 MMOL/L (ref 98–112)
CO2 SERPL-SCNC: 29 MMOL/L (ref 21–32)
CREAT BLD-MCNC: 0.81 MG/DL
EGFRCR SERPLBLD CKD-EPI 2021: 104 ML/MIN/1.73M2 (ref 60–?)
ERYTHROCYTE [DISTWIDTH] IN BLOOD BY AUTOMATED COUNT: 12.6 %
GLOBULIN PLAS-MCNC: 2.9 G/DL (ref 2.8–4.4)
GLUCOSE BLD-MCNC: 127 MG/DL (ref 70–99)
HCT VFR BLD AUTO: 42.4 %
HGB BLD-MCNC: 15 G/DL
MCH RBC QN AUTO: 30.1 PG (ref 26–34)
MCHC RBC AUTO-ENTMCNC: 35.4 G/DL (ref 31–37)
MCV RBC AUTO: 85 FL
OSMOLALITY SERPL CALC.SUM OF ELEC: 292 MOSM/KG (ref 275–295)
PLATELET # BLD AUTO: 206 10(3)UL (ref 150–450)
POTASSIUM SERPL-SCNC: 3.5 MMOL/L (ref 3.5–5.1)
PROT SERPL-MCNC: 5.9 G/DL (ref 6.4–8.2)
RBC # BLD AUTO: 4.99 X10(6)UL
SODIUM SERPL-SCNC: 141 MMOL/L (ref 136–145)
WBC # BLD AUTO: 6.7 X10(3) UL (ref 4–11)

## 2024-03-07 PROCEDURE — 99239 HOSP IP/OBS DSCHRG MGMT >30: CPT | Performed by: HOSPITALIST

## 2024-03-07 RX ORDER — CALCIUM CARBONATE 500 MG/1
1000 TABLET, CHEWABLE ORAL 3 TIMES DAILY PRN
Status: DISCONTINUED | OUTPATIENT
Start: 2024-03-07 | End: 2024-03-07

## 2024-03-07 NOTE — DISCHARGE SUMMARY
[facility]     Mateo Byrd Patient Status:  Inpatient    1970 MRN [id number]   Location [facility] 3NW-A Attending Rene Walker MD   Hosp Day # 4 PCP ELIEL KINGSLEY DO     Date of Admission: 3/3/2024  Date of Discharge:   3/7/24    Discharge Disposition: Acute Care Short Term Hospital    Discharge Diagnosis:  #Acute Pancreatitis, likely GB induced  #pancreatic divisum possible  #possible CBD stricture  Pain control  MRCP noted  Gen surg - outpt denisa  Consult GI   S/p ERCP/EUS w/ small mass biopsied. Stent in CBD placed. D/w patient  D/w GI  [place] for pain, improving now. DC if tolerates soft diet     #Transaminase elevation  Due to above  IVF  Monitor  -improving     #Distended Bladder  Asymptomatic  Likely due to BPH  Flomax when able    History of Present Illness: nilda Byrd is a 53 year old male with a past medical history of anxiety and enlarged prostate.  He comes the emergency room secondary to abdominal pain.  In the emergency room imaging shows distended gallbladder consistent with likely cholecystitis.  Lipase also elevated.     Brief Synopsis: pt w/ pancreatitis. Imaging revealed dilated CBD and possible pancreatic divisum. No stones or masses noted on imaging. Went for ERCP/EUS and stricture found with a small mass. Biopsied and stent placed. Ok to DC home and f/u GI for results. May need surg onc pending path.     Lace+ Score: 36  59-90 High Risk  29-58 Medium Risk  0-28   Low Risk       TCM Follow-Up Recommendation:  LACE 29-58: Moderate Risk of readmission after discharge from the hospital.      Procedures during hospitalization:   ERCP/EUS    Incidental or significant findings and recommendations (brief descriptions):  none    Lab/Test results pending at Discharge:   pathology    Consultants:  Gen surg, gi    Discharge Medication List:     Discharge Medications        START taking these medications        Instructions Prescription details    HYDROcodone-acetaminophen 5-325 MG Tabs  Commonly known as: Norco      Take 1 tablet by mouth every 6 (six) hours as needed.   Quantity: 30 tablet  Refills: 0            CONTINUE taking these medications        Instructions Prescription details   Fish Oil 1200 MG Caps      Take 1 capsule by mouth daily.   Refills: 0     Loratadine 10 MG Caps      Take 1 capsule by mouth daily as needed.   Refills: 0     MULTIVITAMIN OR      Take 1 tablet by mouth daily.    Refills: 0     Probiotic Acidophilus Caps      Take by mouth.   Refills: 0     TURMERIC CURCUMIN OR       Refills: 0     Vitamin D 25 MCG (1000 UT) Tabs       Refills: 0     ZANTAC OR      Inject as directed.   Refills: 0     Zinc 100 MG Tabs       Refills: 0            ASK your doctor about these medications        Instructions Prescription details   PEG 3350-KCl-Na Bicarb-NaCl 420 g Solr  Commonly known as: NULYTELY      Take as directed by physician   Quantity: 4000 mL  Refills: 0               Where to Get Your Medications        These medications were sent to [id number] - [place], [place]  [address] [place] [address] [phone], [phone]  [address] [place] [id number]      Phone: [phone]   HYDROcodone-acetaminophen 5-325 MG Tabs         [place] reviewed[place]    Follow-up appointment:   Shankar Lanza DO  [address] [place] [id number] [phone]    Schedule an appointment as soon as possible for a visit      Lyle Negrete MD  [address] [facility] [address] [phone]    Follow up  As needed    Appointments for Next 30 Days 3/7/2024 - 4/6/2024      None            Vital signs:  Temp:  [98.4 °F (36.9 °C)-98.8 °F (37.1 °C)] 98.8 °F (37.1 °C)  Pulse:  [48-59] 59  Resp:  [18] 18  BP: (138-147)/(82-85) 145/82  SpO2:  [96 %-99 %] 99 %    Physical Exam:    General: No acute distress   Lungs: clear to auscultation  Cardiovascular: S1, S2  Abdomen:  Soft      -----------------------------------------------------------------------------------------------  PATIENT DISCHARGE INSTRUCTIONS: See electronic chart    Rene Walker MD    Total time spent on discharge plannin minutes     The  Century Cures Act makes medical notes like these available to patients in the interest of transparency. Please be advised this is a medical document. Medical documents are intended to carry relevant information, facts as evident, and the clinical opinion of the practitioner. The medical note is intended as peer to peer communication and may appear blunt or direct. It is written in medical language and may contain abbreviations or verbiage that are unfamiliar.

## 2024-03-07 NOTE — PLAN OF CARE
Alert and oriented. Vitals stable. Pt tolerated CLD jello, however, c/o 8/10 abdominal burning pain several hours into the night. X1 dose PRN dilaudid given with relief, pain rated 2/10. Denies nausea, denies lightheadedness, shortness of breath, palpitations. Up to void. IVF infusing. Up ad bossman. Tolerating RA.

## 2024-03-07 NOTE — PROGRESS NOTES
[facility]  Progress Note    Mateo Byrd Patient Status:  Inpatient    1970 MRN [id number]   [facility] 3NW-A Attending Rene Walker MD   Hosp Day # 4 PCP ELIEL KINGSLEY,      Subjective:  The patient reports that he is feeling better today.  He reported mild abdominal pain yesterday.  He is on clear liquid diet.  He is tolerating clear liquids well.  He denies nausea or vomiting.    Objective/Physical Exam:  General: Alert, orientated x3.  Cooperative.  No apparent distress.  Vital Signs:  Blood pressure 145/82, pulse 59, temperature 98.8 °F (37.1 °C), temperature source Oral, resp. rate 18, height 66\", weight 153 lb (69.4 kg), SpO2 99%.  Lungs: No respiratory distress.  Cardiac: Regular rate and rhythm.   Abdomen:  Soft, non distended, non tender, with no rebound or guarding.  No peritoneal signs.   Extremities:  No lower extremity edema noted.        Labs:  Lab Results   Component Value Date    WBC 6.7 2024    HGB 15.0 2024    HCT 42.4 2024    .0 2024     Lab Results   Component Value Date     2024    K 3.5 2024     2024    CO2 29.0 2024    BUN 7 2024    CREATSERUM 0.81 2024     2024    CA 9.1 2024    ALKPHO 124 2024     2024    AST 64 2024    BILT 0.6 2024    ALB 3.0 2024    TP 5.9 2024     No results found for: \"PT\", \"INR\"    I/O last 3 completed shifts:  In: 9061 [P.O.:1510; I.V.:0935]  Out: 1 [Stool:1]  No intake/output data recorded.    Assessment  Patient Active Problem List   Diagnosis    Chronic prostatitis    Special screening for malignant neoplasm of colon    Family history of colonic polyps    Benign neoplasm of cecum    Benign neoplasm of ascending colon    Acute pancreatitis, unspecified complication status, unspecified pancreatitis type (HCC)    Elevated liver enzymes    Dilated cbd, acquired    Abdominal pain, acute      EUS with fine-needle biopsies of pancreatic head mass  ERCP with biliary sphincterotomy and bile stent placement      Plan:  The patient is stable for discharge to home from surgical standpoint once cleared by other specialties.  Diet as per GI.  Pathology is pending, probable follow-up with surgical oncology pending biopsy results.  Follow-up with [facility] general surgery as needed.    April Smith PA-C  3/7/2024  12:11 PM    Addendum:    I have seen and examined the patient; I obtained and performed the above history, physical examination, assessment and plan.  I have I have edited the above to reflect my evaluation, opinion, and physical exam findings.    Exam and plan as above.    ERCP findings noted.  Awaiting final pathology results from biopsy.  Explained to the patient that no general surgical intervention for cholecystectomy required now.  Will await for final pathology prior to further surgical planning.  If malignancy identified, patient may need to see surgical oncology.  For now patient may have diet as tolerated and discharged home.  Patient has instructions to make an appointment with his gastroenterologist, Dr. Zamora within 1 to 2 weeks after discharge for further management and discussion of pathology.    I, Dr. Lyle Negrete, personally performed the services described in this documentation  by Ms Smith, and they are both accurate and complete.    Lyle Negrete MD FACS  [facility] General Surgery

## 2024-03-07 NOTE — PROGRESS NOTES
Notified Dr. Walker that the patient is complaining of severe abdominal pain after eating some yogurt and jello. Dr. Walker stated to keep the patient in the hospital tonight, put him back on a clear liquid diet, and restart intravenous fluid administration. Intravenous Dilaudid administered for the patient's pain per MD order.

## 2024-03-07 NOTE — PROGRESS NOTES
[facility]                       Gastroenterology Follow up Note - [facility]    Mateo Byrd Patient Status:  Inpatient    1970 MRN [id number]   Location [facility] 3NW-A Attending Rene Walker MD   Hosp Day # 4 PCP ELIEL KINGSLEY DO     Reason for consultation: Pancreatitis, elevated liver enzymes, pancreatic head mass  Subjective: Patient seen and examined.  Overnight he did have some worsening upper abdominal pain that was controlled with pain medication.  He feels okay this morning.  Review of Systems:   10 point ROS completed and was negative, except for pertinent positive and negatives stated in subjective.    For PMH, PSH, FHx and SHx- please see initial consult note.     Objective: /82 (BP Location: Left arm)   Pulse 59   Temp 98.8 °F (37.1 °C) (Oral)   Resp 18   Ht 5' 6\" (1.676 m)   Wt 153 lb (69.4 kg)   SpO2 99%   BMI 24.69 kg/m²   Gen: AAO x 3, able to speak in complete sentences.  Resting comfortably in bed  Abdomen: Soft, minimal tender to palpation epigastric region, non-distended with the presence of bowel sounds; No hepatosplenomegaly; no rebound or guarding; No ascites is clinically apparent; no tympany to percussion  Ext: No peripheral edema or cyanosis  Skin: Warm and dry  Psychiatric: Appropriate mood and congruent affect without obvious depression or anxiety  Labs:   Lab Results   Component Value Date    WBC 6.7 2024    HGB 15.0 2024    HCT 42.4 2024    .0 2024    CREATSERUM 0.81 2024    BUN 7 2024     2024    K 3.5 2024     2024    CO2 29.0 2024     2024    CA 9.1 2024    ALB 3.0 2024    ALKPHO 124 2024    BILT 0.6 2024    AST 64 2024     2024     Recent Labs   Lab 24  0538 24  0612 24  0545   GLU 88 124* 127*   BUN 11 10 7*   CREATSERUM 0.98 1.04 0.81   CA 9.1 9.7 9.1   NA  141 143 141   K 3.6 4.4 3.5    112 110   CO2 22.0 28.0 29.0     Recent Labs   Lab 03/06/24  0612 03/07/24  0545   RBC 5.31 4.99   HGB 15.8 15.0   HCT 46.2 42.4   MCV 87.0 85.0   MCH 29.8 30.1   MCHC 34.2 35.4   RDW 12.7 12.6   NEPRELIM 3.22  --    WBC 5.0 6.7   .0 206.0       Recent Labs   Lab 03/05/24  0538 03/06/24  0612 03/07/24  0545   * 695* 475*   * 123* 64*       Assessment:  Impression: 53-year-old male with no significant past medical history admitted 3/3 with abdominal pain found to have acute pancreatitis thought to be biliary in origin given marked LFT elevation on arrival. MRI abdomen/MRCP (3/4) suggests dilated gallbladder with trace pericholecystic fluid, dilated CBD to 1 cm with abrupt tapering within the mid to distal segment, and findings suggestive of pancreatic divisum with a mild prominence of the main pancreatic duct measuring 5 mm.  Underwent EUS and ERCP yesterday with findings of pancreatic head mass and had a 10 French by 7 cm plastic biliary stent placed.  Elevated liver enzymes are secondary to this and now downtrending.  He did have some mild discomfort overnight is overall feeling better this morning after pain medication so less likely post EUS/ERCP pancreatitis, however if pain persists will reevaluate for this.  Plan:  Clear liquid diet and okay to advance to GI soft low-fat diet as tolerated  Pain control per primary  If pain persists, then consider repeating lipase to assess for post EUS/ERCP pancreatitis  Antiemetics as needed  Follow-up pathology results  Continue IV fluids with lactated Ringer's at 100 mL/h  If tolerating soft diet then he can be discharged from GI perspective with further outpatient follow-up.  Will need to follow-up with nurse practitioner or Dr. Zamora in 1 to 2 weeks.    Thank you for allowing us to participate in patient's care. Please call us with any questions or concerns.  The GI consult service will continue to follow.      Terry Bonds [facility] Gastroenterology  [phone]

## 2024-03-07 NOTE — PROGRESS NOTES
NURSING DISCHARGE NOTE    Discharged Home via Ambulatory.  Accompanied by Spouse  Belongings Taken by patient/family.    IV taken out. Discharge instructions given to patient. Patient verbalized understanding. Prescriptions sent to patient's pharmacy.

## 2024-03-07 NOTE — PLAN OF CARE
Patient is alert and oriented. On room air. Abdomen is soft/ non-distended. Patient states passing gas. Patient denies BM. Patient denies nausea. Patient states abdominal pain is better, rates it 2/10. Tolerating clear liquid diet for breakfast so far. Receiving IVF. Up ad bossman. Voiding freely. POC updated with patient. Patient verbalized understanding.

## 2024-03-08 ENCOUNTER — PATIENT OUTREACH (OUTPATIENT)
Dept: CASE MANAGEMENT | Age: 54
End: 2024-03-08

## 2024-03-08 ENCOUNTER — TELEPHONE (OUTPATIENT)
Dept: FAMILY MEDICINE CLINIC | Facility: CLINIC | Age: 54
End: 2024-03-08

## 2024-03-08 DIAGNOSIS — Z02.9 ENCOUNTERS FOR UNSPECIFIED ADMINISTRATIVE PURPOSE: Primary | ICD-10-CM

## 2024-03-08 DIAGNOSIS — K85.90 ACUTE PANCREATITIS, UNSPECIFIED COMPLICATION STATUS, UNSPECIFIED PANCREATITIS TYPE (HCC): ICD-10-CM

## 2024-03-08 NOTE — PROGRESS NOTES
Initial Post Discharge Follow Up   Discharge Date: 3/7/24  Contact Date: 3/8/2024    Consent Verification:  Assessment Completed With: Patient  HIPAA Verified?  Yes    Discharge Dx:   Acute pancreatitis, unspecified complication status, unspecified pancreatitis type (HCC)     General:   How have you been since your discharge from the hospital? NCM spoke with patient states he is feeling a little sore. Pt denies any nausea, vomiting, shortness of breath, chest pain or any others. Pt states has really eaten much. He denies any constipation or diarrhea. Pt states took advil last night and this morning, has not had to take Norco for pain.   Do you have any pain since discharge?  Yes  Where: abdominal pain    Rating on pain scale 1-10, 10 being the worst pain you have ever experienced, what is current pain: 3  Alleviating factors: Advil  Aggravating factors: unknown  Is the pain manageable at home? Yes  How well was your pain managed while in the hospital?   On a scale of 1-5   1- Very Poor and 5- Very well   5  When you were leaving the hospital were your discharge instructions reviewed with you? Yes  How well were your discharge instructions explained to you?   On a scale of 1-5   1- Very Poor and 5- Very well   5  Do you have any questions about your discharge instructions?  No  Before leaving the hospital was your diagnoses explained to you? Yes  Do you have any questions about your diagnoses? No  Are you able to perform normal daily activities of living as you have prior to your hospital stay (dressing, bathing, ambulating to the bathroom, etc)? yes  (NCM) Was patient given a different diet per AVS? no      Medications: Reviewed medication list.  Medications are up to date.  Current Outpatient Medications   Medication Sig Dispense Refill    HYDROcodone-acetaminophen 5-325 MG Oral Tab Take 1 tablet by mouth every 6 (six) hours as needed. 30 tablet 0    raNITIdine HCl (ZANTAC OR) Inject as directed.      PEG  3350-KCl-Na Bicarb-NaCl 420 g Oral Recon Soln Take as directed by physician (Patient not taking: Reported on 3/3/2024) 4000 mL 0    Zinc 100 MG Oral Tab       TURMERIC CURCUMIN OR       Cholecalciferol (VITAMIN D) 25 MCG (1000 UT) Oral Tab       Lactobacillus (PROBIOTIC ACIDOPHILUS) Oral Cap Take by mouth.      Multiple Vitamins-Minerals (MULTIVITAMIN OR) Take 1 tablet by mouth daily.         Omega-3 Fatty Acids (FISH OIL) 1200 MG Oral Cap Take 1 capsule by mouth daily.      Loratadine 10 MG Oral Cap Take 1 capsule by mouth daily as needed.       Were there any changes to your current medication(s) noted on the AVS? Yes  If so, were these medication changes discussed with you prior to leaving the hospital? Yes  If a new medication was prescribed:    Was the new medication's purpose & side effects reviewed? Yes  Do you have any questions about your new medication? No  Did you  your discharge medications when you left the hospital? Yes  Let's go over your medications together to make sure we are not missing anything. Medications Reviewed  Are there any reasons that keep you from taking your medication as prescribed? No  Are you having any concerns with constipation? No  Did patient receive their flu shot (Sept-March)? No    Discharge medications reviewed/discussed/and reconciled against outpatient medications with patient.  Any changes or updates to medications sent to PCP.  Patient Acknowledged     Referrals/orders at D/C:  Referrals/orders placed at D/C? no    DME ordered at D/C? No      Discharge orders, AVS reviewed and discussed with patient. Any changes or updates to orders sent to PCP.  Patient Acknowledged      SDOH:   Transportation Needs: No Transportation Needs (3/3/2024)    Transportation Needs     Lack of Transportation: No     Financial Resource Strain: Low Risk  (3/8/2024)    Financial Resource Strain     Difficulty of Paying Living Expenses: Not very hard     Med Affordability: Not on file          Follow up appointments:          TCC  Was TCC ordered: No      PCP (If no TCC appointment)  Does patient already have a PCP appointment scheduled? No  NCM Attempted to schedule PCP office TCM appointment with patient   If no appointment scheduled: Explain: pt declined, states will follow up with GI.     Specialist    Does the patient have any other follow up appointment(s) needing to be scheduled? Yes  If yes: NCM reviewed upcoming specialist appointment with patient: Yes  Does the patient need assistance scheduling appointment(s): No    Is there any reason as to why you cannot make your appointment(s)?  No     Needs post D/C:   Now that you are home, are there any needs or concerns you need addressed before your next visit with your PCP?  (DME, meds, questions, etc.): No    Interventions by NCM:   All discharge instructions reviewed with the patient. Reviewed when to call MD vs when to call 911 or go the ED. Educated patient on the importance of taking all meds as prescribed as well as close f/u with PCP/specialists. Pt verbalized understanding and will contact the office with any further questions or concerns. Patient denies fevers, chills, nausea, vomiting, shortness of breath, chest pain, or any other symptoms at this time.  NCM attempted to schedule HFU, patient declined will call PCP/TCC office directly. NCM sent TE to PCP office re: assistance in scheduling HFU appt. NCM provided contact information for any further questions/concerns. Patient verbalized understanding and agreeable.       Overall Rating:   How would you rate the care you received while in the hospital? good    CCM referral placed:    No    BOOK BY DATE: 03/21/24

## 2024-03-08 NOTE — TELEPHONE ENCOUNTER
Spoke to patient for TCM today.  Patient does not have a TCM appointment scheduled at this time.  Per guidelines patient should  be seen within seven days by 03/14/24.     Otherwise, last date for TCM: 03/21/24     Clinical staff:  Please follow-up with patient and try to get them to schedule as patient would greatly benefit from a TCM appointment.  Thank you!

## 2024-03-11 LAB — A-1-ANTITRYPSIN: 113 MG/DL

## 2024-03-14 ENCOUNTER — OFFICE VISIT (OUTPATIENT)
Dept: FAMILY MEDICINE CLINIC | Facility: CLINIC | Age: 54
End: 2024-03-14
Payer: COMMERCIAL

## 2024-03-14 VITALS
BODY MASS INDEX: 24.88 KG/M2 | HEIGHT: 66 IN | DIASTOLIC BLOOD PRESSURE: 82 MMHG | TEMPERATURE: 97 F | WEIGHT: 154.81 LBS | SYSTOLIC BLOOD PRESSURE: 130 MMHG | RESPIRATION RATE: 18 BRPM | HEART RATE: 71 BPM

## 2024-03-14 DIAGNOSIS — C25.0 MALIGNANT NEOPLASM OF HEAD OF PANCREAS (HCC): Primary | ICD-10-CM

## 2024-03-14 PROCEDURE — 99495 TRANSJ CARE MGMT MOD F2F 14D: CPT | Performed by: FAMILY MEDICINE

## 2024-03-14 PROCEDURE — 3008F BODY MASS INDEX DOCD: CPT | Performed by: FAMILY MEDICINE

## 2024-03-14 PROCEDURE — 3079F DIAST BP 80-89 MM HG: CPT | Performed by: FAMILY MEDICINE

## 2024-03-14 PROCEDURE — 3075F SYST BP GE 130 - 139MM HG: CPT | Performed by: FAMILY MEDICINE

## 2024-03-14 NOTE — PROGRESS NOTES
Subjective:   Mateo Byrd is a 53 year old male who presents for hospital follow up.   He was discharged from [facility] to Home or Self Care  Admission Date: 3/3/24   Discharge Date: 3/7/24  Hospital Discharge Diagnosis: Acute pancreatitis, pancreatic cancer    Interactive contact within 2 business days post discharge first initiated on Date: 3/8/2024    During the visit, the following was completed:  Obtained and reviewed discharge summary, continuity of care documents, Hospitalist notes, Surgery Notes, and GI notes  Reviewed Labs (CBC, CMP), CT radiology results, and MRI results, ERCP results, pathology report results    HPI: This is a 53-year-old gentleman who presented to the ER with abdominal pain.  Imaging in the ER demonstrated gallbladder distention along with possible cholecystitis.  Lipase was also significantly elevated.  He was originally diagnosed with pancreatitis.  Imaging revealed that he had a dilated common bile duct and possible pancreatic divisum.  No stones or masses were seen on imaging however patient then underwent ERCP/EUS and the stricture was found with a small mass on the head of the pancreas.  Biopsy was collected and sent.  Unfortunately results finalized last night from the biopsy showed pancreatic cancer.  He is here today for hospital follow-up and to discuss results.  He still continues to have some intermittent abdominal pain particular in the epigastric region.    History/Other:   Current Medications:  Medication Reconciliation:  I am aware of an inpatient discharge within the last 30 days.  The discharge medication list has been reconciled with the patient's current medication list and reviewed by me.  See medication list for additions of new medication, and changes to current doses of medications and discontinued medications.  Outpatient Medications Marked as Taking for the 3/14/24 encounter (Office Visit) with Shankar Lanza, DO   Medication Sig     HYDROcodone-acetaminophen 5-325 MG Oral Tab Take 1 tablet by mouth every 6 (six) hours as needed.    raNITIdine HCl (ZANTAC OR) Inject as directed.    Zinc 100 MG Oral Tab     TURMERIC CURCUMIN OR     Cholecalciferol (VITAMIN D) 25 MCG (1000 UT) Oral Tab     Lactobacillus (PROBIOTIC ACIDOPHILUS) Oral Cap Take by mouth.    Multiple Vitamins-Minerals (MULTIVITAMIN OR) Take 1 tablet by mouth daily.       Omega-3 Fatty Acids (FISH OIL) 1200 MG Oral Cap Take 1 capsule by mouth daily.    Loratadine 10 MG Oral Cap Take 1 capsule by mouth daily as needed.       Review of Systems:  GENERAL: weight stable, energy stable, no sweating  SKIN: denies any unusual skin lesions  EYES: denies blurred vision or double vision  HEENT: denies nasal congestion, sinus pain or ST  LUNGS: denies shortness of breath with exertion  CARDIOVASCULAR: denies chest pain on exertion or palpitations  GI: + intermittent abdominal pain, denies heartburn, denies diarrhea  MUSCULOSKELETAL: denies pain, normal range of motion of extremities  NEURO: denies headaches, denies dizziness, denies weakness    Objective:   No LMP for male patient.  Estimated body mass index is 24.99 kg/m² as calculated from the following:    Height as of this encounter: 5' 6\" (1.676 m).    Weight as of this encounter: 154 lb 12.8 oz (70.2 kg).   /82   Pulse 71   Temp 97 °F (36.1 °C) (Temporal)   Resp 18   Ht 5' 6\" (1.676 m)   Wt 154 lb 12.8 oz (70.2 kg)   BMI 24.99 kg/m²    GENERAL: well developed, well nourished, in no apparent distress  SKIN: no rashes, no suspicious lesions  HEENT: atraumatic, normocephalic, ears and throat are clear  EYES: PERRLA, EOMI, conjunctiva are clear  NECK: supple, no adenopathy  LUNGS: clear to auscultation, no r/r/w  CARDIO: RRR without murmur  GI: good BS's, no masses, HSM or tenderness  MUSCULOSKELETAL: back is not tender, FROM of the extremities  EXTREMITIES: no cyanosis, clubbing or edema  NEURO: Oriented times three, cranial nerves  are intact, motor and sensory are grossly intact    Assessment & Plan:   1. Malignant neoplasm of head of pancreas (HCC) (Primary)  -     Surg Onc Referral - In Network  -     Oncology/Hematology Referral - [place] ([place])  -  likely source of current symptoms  -  GI following and will provide further instructions for patient  -  recheck LFTs  -  refer to surgical oncology and medical oncology  -  further recs per oncology        No follow-ups on file.

## 2024-03-15 ENCOUNTER — PATIENT MESSAGE (OUTPATIENT)
Dept: FAMILY MEDICINE CLINIC | Facility: CLINIC | Age: 54
End: 2024-03-15

## 2024-03-15 DIAGNOSIS — C25.0 MALIGNANT NEOPLASM OF HEAD OF PANCREAS (HCC): Primary | ICD-10-CM

## 2024-03-15 NOTE — TELEPHONE ENCOUNTER
Per Dr. Lanza, We can try him on higher dose of norco given his recent diagnosis of pancreatic cancer. We can try norco 10/325 every 8 hours as needed with #30 and no refills.     Script pended for approval

## 2024-03-15 NOTE — TELEPHONE ENCOUNTER
From: Mateo Byrd  To: ELIEL KINGSLEY  Sent: 3/15/2024 11:11 AM CDT  Subject: Pain    Is there something else I can take for pain? I only take it when it gets bad but the Norco doesn’t seem to do much for me.

## 2024-03-17 RX ORDER — HYDROCODONE BITARTRATE AND ACETAMINOPHEN 10; 325 MG/1; MG/1
1 TABLET ORAL EVERY 8 HOURS PRN
Qty: 30 TABLET | Refills: 0 | Status: SHIPPED | OUTPATIENT
Start: 2024-03-17

## 2024-03-18 ENCOUNTER — TELEPHONE (OUTPATIENT)
Dept: FAMILY MEDICINE CLINIC | Facility: CLINIC | Age: 54
End: 2024-03-18

## 2024-03-18 DIAGNOSIS — C25.0 MALIGNANT NEOPLASM OF HEAD OF PANCREAS (HCC): Primary | ICD-10-CM

## 2024-03-18 NOTE — TELEPHONE ENCOUNTER
I received a message back from [facility] Centralized referrals, Luz TA. She did confirm Dr. Camilo Corral is in network for IHP. She stated you can also log into [facility] website to confirm. Referral pended to Dr. Lanza for authorization. I will fax the referral to [phone] once authorized. Pt.s spouse was informed of above.

## 2024-03-18 NOTE — TELEPHONE ENCOUNTER
I called and notified the pt.s spouse that Dr. Lanza said this physician is not in network for IHP. I offered the name of another physician but the spouse did not want the information. She said she was told this doctor is in network and she will call back. I sent a message to Luz TA with centralized referrals to see if this specialist is in network and I am waiting for a response.

## 2024-03-18 NOTE — TELEPHONE ENCOUNTER
Pt requesting referral for the below specialist:    Specialty: Sergical oncologist   Provider/Specialist: Dr. Bradley Corral   Address:  Ph.#    Reason/Symptoms: 2nd opinion     Has pt seen PCP for this condition? (Y/N): Y    Insurance: [facility]         Pt has appointment next Wednesday. Please fax referral to [phone]

## 2024-03-18 NOTE — TELEPHONE ENCOUNTER
Unfortunately, that physician is not in network and likely won't be approved by [facility] insurance.  Dr. Ayala is in network.

## 2024-03-19 PROBLEM — C25.9 PANCREATIC ADENOCARCINOMA (HCC): Status: ACTIVE | Noted: 2024-03-19

## 2024-03-19 NOTE — CONSULTS
[facility]        Patient Name:  Mateo Byrd   YOB: 1970   Gender:  Male   Appt Date:  3/20/2024   Provider:  Anuj Reyez MD     PATIENT PROVIDERS  Referring Provider: Shankar Kingsley DO    Primary Care Provider:SHANKAR KINGSLEY DO   Address: [address] [place] [address]   Phone #: [phone]       CHIEF COMPLAINT  Chief Complaint   Patient presents with    Consult        PROBLEMS  Reviewed   Patient Active Problem List   Diagnosis    Chronic prostatitis    Special screening for malignant neoplasm of colon    Family history of colonic polyps    Benign neoplasm of cecum    Benign neoplasm of ascending colon    Acute pancreatitis, unspecified complication status, unspecified pancreatitis type (HCC)    Elevated liver enzymes    Dilated cbd, acquired    Acute biliary pancreatitis (HCC)    Cancer of head of pancreas (HCC)        History of Present Illness:  Patient is a 53 year old man who is currently being referred for consideration of surgical oncology management of recently diagnosed pancreatic adenocarcinoma.     Patient began having recurrent stomach discomfort in late February described as burning pain.  Pain returned on 3/3 which radiated into the middle of the back, associated with right upper quadrant pain.  US completed showing dilated common bile duct.  CT with distended gallbladder, common bile duct dilation to 13 mm concerning for obstruction.  No pancreatic lesion identified. Patient was admitted for biliary pancreatitis.     3/4/2024: MRI/MRCP dilated common bile duct to 1 cm with abrupt tapering within the middle to distal segment.  No enhancing lesion identified at the site.      3/6/2024: ERCP/EUS showed possible pancreas divisum and stricture with small pancreatic head mass, hypoechoic heterogenous mass, 1.8 cm in diameter.  Area was biopsied and stent was placed.  Biopsy of pancreatic head lesion revealed adenocarcinoma. CA 19-9 23.9.      Patient has family history of a mother with breast and uterine cancer diagnosed between 60 and 70 years old, father with gastric or pancreatic cancer-patient is unsure, paternal uncle with prostate cancer, maternal grandfather with bone cancer.       Vital Signs:  /82 (BP Location: Left arm, Patient Position: Sitting, Cuff Size: adult)   Pulse 59   Temp 97.2 °F (36.2 °C) (Temporal)   Resp 20   Ht 1.702 m (5' 7\")   Wt 71 kg (156 lb 9.6 oz)   BMI 24.53 kg/m²      Medications Reviewed:    Current Outpatient Medications:     HYDROcodone-acetaminophen (NORCO)  MG Oral Tab, Take 1 tablet by mouth every 8 (eight) hours as needed for Pain., Disp: 30 tablet, Rfl: 0    HYDROcodone-acetaminophen 5-325 MG Oral Tab, Take 1 tablet by mouth every 6 (six) hours as needed., Disp: 30 tablet, Rfl: 0    raNITIdine HCl (ZANTAC OR), Inject as directed., Disp: , Rfl:     Zinc 100 MG Oral Tab, , Disp: , Rfl:     TURMERIC CURCUMIN OR, , Disp: , Rfl:     Cholecalciferol (VITAMIN D) 25 MCG (1000 UT) Oral Tab, , Disp: , Rfl:     Lactobacillus (PROBIOTIC ACIDOPHILUS) Oral Cap, Take by mouth., Disp: , Rfl:     Multiple Vitamins-Minerals (MULTIVITAMIN OR), Take 1 tablet by mouth daily. , Disp: , Rfl:     Omega-3 Fatty Acids (FISH OIL) 1200 MG Oral Cap, Take 1 capsule by mouth daily., Disp: , Rfl:     Loratadine 10 MG Oral Cap, Take 1 capsule by mouth daily as needed., Disp: , Rfl:      Allergies Reviewed:  Allergies   Allergen Reactions    Seasonal         History:  Reviewed:  Past Medical History:   Diagnosis Date    Abdominal pain 02/25/24    Anxiety 2005    Belching 2005    Bloating 20005    Disorder of prostate 2016    Slightly enlarged    Flatulence/gas pain/belching 2005    Food intolerance 2005    Headache disorder 1995    Hemorrhoids 2010    Stented coronary artery 03/06/24    Stress 2005    Wears glasses 1995      Reviewed:  Past Surgical History:   Procedure Laterality Date    Colonoscopy  January 2022     Ercp,diagnostic  24    Knee replacement surgery      Other surgical history  1982    left knee/\"removed a band\"      Reviewed Social History:  Social History     Socioeconomic History    Marital status:    Tobacco Use    Smoking status: Former     Packs/day: 1.00     Years: 18.00     Additional pack years: 0.00     Total pack years: 18.00     Types: Cigarettes     Quit date: 10/1/2004     Years since quittin.4    Smokeless tobacco: Former     Types: Chew     Quit date: 1985   Substance and Sexual Activity    Alcohol use: Yes     Alcohol/week: 4.0 standard drinks of alcohol     Types: 2 Cans of beer, 2 Standard drinks or equivalent per week     Comment: 3-4drinks/wk    Drug use: Never    Sexual activity: Yes     Partners: Female   Other Topics Concern    Caffeine Concern Yes     Comment: 3x/day    Exercise Yes     Comment: walk, bike, run weights     Social Determinants of Health     Financial Resource Strain: Low Risk  (3/8/2024)    Financial Resource Strain     Difficulty of Paying Living Expenses: Not very hard   Food Insecurity: No Food Insecurity (3/3/2024)    Food Insecurity     Food Insecurity: Never true   Transportation Needs: No Transportation Needs (3/8/2024)    Transportation Needs     Lack of Transportation: No   Housing Stability: Low Risk  (3/3/2024)    Housing Stability     Housing Instability: No      Reviewed:  Family History   Problem Relation Age of Onset    Diabetes Mother         prediabetes    Hypertension Mother     Lipids Mother     Breast Cancer Mother     Uterine Cancer Mother     Colon Polyps Mother     Cancer Father         abdominal: pancreas vs stomach    Hypertension Father     Hypertension Brother     Lipids Brother     Hypertension Brother     Heart Disorder Maternal Grandmother     Hypertension Maternal Grandmother     Cancer Maternal Grandfather         bone cancer    Other (Other[other]) Paternal Grandmother         emphysema    Other (Other[other])  Daughter         cerebral palsy    Prostate Cancer Paternal Uncle       Review of Systems:  GENERAL HEALTH: feels well, no fatigue.   RESPIRATORY: denies shortness of breath, wheezing or cough   CARDIOVASCULAR: denies chest pain, SOB, edema,orthopnea, no palpitations   GI: Afraid to eat; denies nausea, vomiting, constipation, diarrhea; no rectal bleeding  GENITAL/: no blood in urine  MUSCULOSKELETAL: no joint complaints, +back pain  NEURO: no tingling, numbness, weakness  ENDOCRINE: 10 pound weight loss in the last 3 weeks  PSYCH: Anxious       Physical Examination:  Constitutional: NAD.   Eyes: Sclera: non-icteric.   Lymph Nodes: Lymph Nodes no cervical LAD, supraclavicular LAD, axillary LAD, or inguinal LAD.   Lungs: Auscultation: breath sounds normal.   Cardiovascular: Heart Auscultation: RRR.   Abdomen: Inspection and Palpation: no masses, tenderness (no guarding, no rebound), or CVA tenderness and soft and non-distended. Liver: no hepatomegaly. Spleen: no splenomegaly. Hernia: none palpable.   Musculoskeletal: Extremities: no edema.   Skin: Inspection and palpation: no jaundice.      Document Review:  Summarized above.                  Procedure(s):  None     Assessment / Plan:  Pancreatic head adenocarcinoma     Findings were discussed with the patient at length.  His wife was present.  The case was presented today at our multidisciplinary tumor board and my recommendations reflect those of the tumor board.  I recommended pancreaticoduodenectomy/Whipple procedure.  The surgical treatment matter including indications, rationale, and risks was discussed in detail.  Complications discussed include (but not limited to) death, re-operation, anastomotic leaks, pancreatic fistula, bile leak, wound infection, delayed gastric emptying, intra-abdominal abscess, pancreatitis, cholangitis, hemobilia and other bleeding, cardiac, pulmonary, anesthetic, DVT, PE, renal failure, hepatic dysfuntion. Post-operative hospital  stay and readmission rate discussed.   Patient agreed and understood.  Arrangements will be made to schedule the procedure pending  *CT abdomen pancreas protocol  *CT chest  Will also need genetic counseling.  Patient had ample time to ask questions.     Pancreatitis  -Resolved  -Anticipate surgery mid April    Weight loss  -Dietary evaluation.  -Lakeisha Costa RD to consult upon.            Electronically Signed by: Anuj Reyez MD

## 2024-03-20 ENCOUNTER — OFFICE VISIT (OUTPATIENT)
Dept: SURGERY | Facility: CLINIC | Age: 54
End: 2024-03-20
Payer: COMMERCIAL

## 2024-03-20 ENCOUNTER — OFFICE VISIT (OUTPATIENT)
Dept: HEMATOLOGY/ONCOLOGY | Facility: HOSPITAL | Age: 54
End: 2024-03-20
Attending: INTERNAL MEDICINE
Payer: COMMERCIAL

## 2024-03-20 VITALS
DIASTOLIC BLOOD PRESSURE: 82 MMHG | TEMPERATURE: 97 F | WEIGHT: 156.63 LBS | HEIGHT: 67 IN | BODY MASS INDEX: 24.58 KG/M2 | HEART RATE: 59 BPM | SYSTOLIC BLOOD PRESSURE: 143 MMHG | RESPIRATION RATE: 20 BRPM

## 2024-03-20 DIAGNOSIS — C25.0 CANCER OF HEAD OF PANCREAS (HCC): Primary | ICD-10-CM

## 2024-03-20 DIAGNOSIS — C25.9 PANCREATIC ADENOCARCINOMA (HCC): ICD-10-CM

## 2024-03-20 DIAGNOSIS — K85.10 ACUTE BILIARY PANCREATITIS WITHOUT INFECTION OR NECROSIS (HCC): ICD-10-CM

## 2024-03-20 PROCEDURE — 3079F DIAST BP 80-89 MM HG: CPT | Performed by: SURGERY

## 2024-03-20 PROCEDURE — 99205 OFFICE O/P NEW HI 60 MIN: CPT | Performed by: SURGERY

## 2024-03-20 PROCEDURE — 3077F SYST BP >= 140 MM HG: CPT | Performed by: SURGERY

## 2024-03-20 PROCEDURE — 3008F BODY MASS INDEX DOCD: CPT | Performed by: SURGERY

## 2024-03-20 NOTE — PROGRESS NOTES
Oncology Nutrition Consultation    Patient Name: Mateo Byrd  YOB: 1970  Medical Record Number: [id number]   Account Number: [id number]  Dietitian: Lakeisha Costa RD, [place]    Date of visit: 3/20/2024    Diet Rx: lowfat, +/- low fiber, high protein/calorie, pre-op    Pertinent Dx/PMH: Pancreatic (head) cancer    Past Medical History:   Diagnosis Date    Abdominal pain 02/25/24    Anxiety 2005    Belching 2005    Bloating 20005    Disorder of prostate 2016    Slightly enlarged    Flatulence/gas pain/belching 2005    Food intolerance 2005    Headache disorder 1995    Hemorrhoids 2010    Stented coronary artery 03/06/24    Stress 2005    Wears glasses 1995       [place]: s/p stent; surgery pending    Other pertinent subjective/objective information: diet/wt/sx hx obtained    Pertinent Meds:    Current Outpatient Medications:     HYDROcodone-acetaminophen (NORCO)  MG Oral Tab, Take 1 tablet by mouth every 8 (eight) hours as needed for Pain., Disp: 30 tablet, Rfl: 0    HYDROcodone-acetaminophen 5-325 MG Oral Tab, Take 1 tablet by mouth every 6 (six) hours as needed., Disp: 30 tablet, Rfl: 0    raNITIdine HCl (ZANTAC OR), Inject as directed., Disp: , Rfl:     Zinc 100 MG Oral Tab, , Disp: , Rfl:     TURMERIC CURCUMIN OR, , Disp: , Rfl:     Cholecalciferol (VITAMIN D) 25 MCG (1000 UT) Oral Tab, , Disp: , Rfl:     Lactobacillus (PROBIOTIC ACIDOPHILUS) Oral Cap, Take by mouth., Disp: , Rfl:     Multiple Vitamins-Minerals (MULTIVITAMIN OR), Take 1 tablet by mouth daily. , Disp: , Rfl:     Omega-3 Fatty Acids (FISH OIL) 1200 MG Oral Cap, Take 1 capsule by mouth daily., Disp: , Rfl:     Loratadine 10 MG Oral Cap, Take 1 capsule by mouth daily as needed., Disp: , Rfl:     Pertinent Labs: noted; Lipase 3/5 WNL    Height: 5'6\"            IBW: 142  +/- 10%    WT HX:   Wt Readings from Last 9 Encounters:   03/20/24 71 kg (156 lb 9.6 oz)   03/14/24 70.2 kg (154 lb 12.8 oz)   03/13/24 69.2 kg (152 lb 9.6 oz)    03/04/24 69.4 kg (153 lb)   03/03/24 69.4 kg (153 lb)   07/13/23 70.3 kg (155 lb)   01/26/22 72.6 kg (160 lb)   10/28/21 72.6 kg (160 lb)   12/03/19 72.6 kg (160 lb)       Estimated Nutrition Needs: 30-35 kcals/kg = 4218-8338 KCALS/d; 1.5-1.8 gms protein/kg = 107-128 gms/d    Services Provided: Verbal and written ix provided addressing -  potential ways to increase protein/calorie intake    Assessment/Plan: RD met w/ this pleasant, talkative, 52 y/o male and his wife as per surgeon request in preparation for surgery.     Pt w/ recent h/o of unplanned wt loss resulting from NPO status during recent hospitalization and \"burning sensations\" in abdominal area post-pancreatic stent placement. Pt also feeling muscle loss as has not been excising as he had been pre-dx.     Diet hx revealed a well-balanced, lowfat/fat free (as per hospital diet on discharge), frequent meal/d diet. Pt noted oats w/ blueberries and [employer] shake (24 gms protein/scoop) mixed w/ skim milk; 1/2 turkey sandwich on wheat bread for snack before and after lunch; balanced lunch and dinner. Pt noted purchasing fat free fruit smoothie and drinking as well. Pt noted exercising most days of the week w/ weights and aerobically (biking/running).     RD offered simple ways to improve present intake - making oats using milk, drinking diluted juices rather than H2O throughout the day, attempting larger portions. As per PA advice, suggested pt attempt adding small portions of unsaturated fats to diet and assess tolerance - peanut butter, avocado, salmon.     Both pt and spouse verbalized understanding of recommendations made. RD also advised pt to monitor for signs of pancreatic enzyme deficiency.     RD offered support and provided name/# for referral.   Thank you for allowing me to participate in the care of Mateo.      The 21st Century Cures Act makes medical notes like these available to patients in the interest of transparency. Please be advised this  is a medical document. Medical documents are intended to carry relevant information, facts as evident, and the clinical opinion of the practitioner. The medical note is intended as peer to peer communication and may appear blunt or direct. It is written in medical language and may contain abbreviations or verbiage that are unfamiliar.

## 2024-03-21 ENCOUNTER — TELEPHONE (OUTPATIENT)
Dept: SURGERY | Facility: CLINIC | Age: 54
End: 2024-03-21

## 2024-03-23 ENCOUNTER — LAB ENCOUNTER (OUTPATIENT)
Dept: LAB | Facility: HOSPITAL | Age: 54
End: 2024-03-23
Attending: FAMILY MEDICINE
Payer: COMMERCIAL

## 2024-03-23 DIAGNOSIS — K83.8 DILATED CBD, ACQUIRED: ICD-10-CM

## 2024-03-23 DIAGNOSIS — K85.90 ACUTE PANCREATITIS, UNSPECIFIED COMPLICATION STATUS, UNSPECIFIED PANCREATITIS TYPE (HCC): ICD-10-CM

## 2024-03-23 DIAGNOSIS — K86.89 MASS OF HEAD OF PANCREAS (HCC): ICD-10-CM

## 2024-03-23 DIAGNOSIS — R74.8 ELEVATED LIVER ENZYMES: ICD-10-CM

## 2024-03-23 LAB
ALBUMIN SERPL-MCNC: 3.7 G/DL (ref 3.4–5)
ALBUMIN/GLOB SERPL: 1 {RATIO} (ref 1–2)
ALP LIVER SERPL-CCNC: 114 U/L
ALT SERPL-CCNC: 121 U/L
ANION GAP SERPL CALC-SCNC: 4 MMOL/L (ref 0–18)
AST SERPL-CCNC: 37 U/L (ref 15–37)
BILIRUB SERPL-MCNC: 0.4 MG/DL (ref 0.1–2)
BUN BLD-MCNC: 24 MG/DL (ref 9–23)
CALCIUM BLD-MCNC: 10.2 MG/DL (ref 8.5–10.1)
CHLORIDE SERPL-SCNC: 103 MMOL/L (ref 98–112)
CO2 SERPL-SCNC: 30 MMOL/L (ref 21–32)
CREAT BLD-MCNC: 1.13 MG/DL
EGFRCR SERPLBLD CKD-EPI 2021: 77 ML/MIN/1.73M2 (ref 60–?)
FASTING STATUS PATIENT QL REPORTED: YES
GLOBULIN PLAS-MCNC: 3.7 G/DL (ref 2.8–4.4)
GLUCOSE BLD-MCNC: 106 MG/DL (ref 70–99)
OSMOLALITY SERPL CALC.SUM OF ELEC: 288 MOSM/KG (ref 275–295)
POTASSIUM SERPL-SCNC: 4.2 MMOL/L (ref 3.5–5.1)
PROT SERPL-MCNC: 7.4 G/DL (ref 6.4–8.2)
SODIUM SERPL-SCNC: 137 MMOL/L (ref 136–145)

## 2024-03-23 PROCEDURE — 80053 COMPREHEN METABOLIC PANEL: CPT

## 2024-03-23 PROCEDURE — 36415 COLL VENOUS BLD VENIPUNCTURE: CPT

## 2024-03-25 ENCOUNTER — OFFICE VISIT (OUTPATIENT)
Dept: FAMILY MEDICINE CLINIC | Facility: CLINIC | Age: 54
End: 2024-03-25
Payer: COMMERCIAL

## 2024-03-25 VITALS
WEIGHT: 150 LBS | RESPIRATION RATE: 16 BRPM | OXYGEN SATURATION: 97 % | DIASTOLIC BLOOD PRESSURE: 72 MMHG | HEART RATE: 71 BPM | SYSTOLIC BLOOD PRESSURE: 116 MMHG | TEMPERATURE: 98 F | BODY MASS INDEX: 24.11 KG/M2 | HEIGHT: 66 IN

## 2024-03-25 DIAGNOSIS — J02.9 SORE THROAT: Primary | ICD-10-CM

## 2024-03-25 DIAGNOSIS — J02.0 STREP PHARYNGITIS: ICD-10-CM

## 2024-03-25 LAB
CONTROL LINE PRESENT WITH A CLEAR BACKGROUND (YES/NO): YES YES/NO
STREP GRP A CUL-SCR: POSITIVE

## 2024-03-25 RX ORDER — AMOXICILLIN 875 MG/1
875 TABLET, COATED ORAL 2 TIMES DAILY
Qty: 20 TABLET | Refills: 0 | Status: SHIPPED | OUTPATIENT
Start: 2024-03-25 | End: 2024-04-04

## 2024-03-25 NOTE — PATIENT INSTRUCTIONS
Please start Amoxicillin, as discussed. Finish all the medication even if you feel better.  Probiotics or yogurt taken daily will help replace good bacteria to the gut with antibiotic use.  You may take Tylenol or Ibuprofen over the counter for comfort and swelling as directed.  Salt water gargles (1/2 coffee mug full of water, microwave for one minute, add 1 teaspoon of salt and swish, gargle, spit until empty) will help reduce pain and swelling.  You are considered contagious until 24 hours after the start of your antibiotics. Avoid work or school during this time.  Remove your toothbrush and toothpaste from shared areas, do not share towels or glasses, wash all dishes in hot soapy water.  Throw out your toothbrush in three days to avoid recontamination.  Hydrate with hot or cold drinks for comfort, wash hands or use hand hygiene often. Cover your cough or sneeze.  Follow up in two weeks for recheck if not 100% improved; seek immediate care if shortness of breath, inability to swallow saliva or breathe.

## 2024-03-25 NOTE — PROGRESS NOTES
HPI:   Mateo Byrd is a 53 year old male who presents with ill symptoms for  1  days. Patient reports sore throat, left ear pain. Has tried Motrin with some good temporary relief. No contacts known to be sick.    Current Outpatient Medications   Medication Sig Dispense Refill    HYDROcodone-acetaminophen (NORCO)  MG Oral Tab Take 1 tablet by mouth every 8 (eight) hours as needed for Pain. 30 tablet 0    HYDROcodone-acetaminophen 5-325 MG Oral Tab Take 1 tablet by mouth every 6 (six) hours as needed. 30 tablet 0    Zinc 100 MG Oral Tab       TURMERIC CURCUMIN OR       Cholecalciferol (VITAMIN D) 25 MCG (1000 UT) Oral Tab       Lactobacillus (PROBIOTIC ACIDOPHILUS) Oral Cap Take by mouth.      Multiple Vitamins-Minerals (MULTIVITAMIN OR) Take 1 tablet by mouth daily.         Omega-3 Fatty Acids (FISH OIL) 1200 MG Oral Cap Take 1 capsule by mouth daily.      raNITIdine HCl (ZANTAC OR) Inject as directed. (Patient not taking: Reported on 3/25/2024)      Loratadine 10 MG Oral Cap Take 1 capsule by mouth daily as needed. (Patient not taking: Reported on 3/25/2024)       No current facility-administered medications for this visit.      Past Medical History:   Diagnosis Date    Abdominal pain 02/25/24    Anxiety 2005    Belching 2005    Bloating 20005    Disorder of prostate 2016    Slightly enlarged    Flatulence/gas pain/belching 2005    Food intolerance 2005    Headache disorder 1995    Hemorrhoids 2010    Stented coronary artery 03/06/24    Stress 2005    Wears glasses 1995      Past Surgical History:   Procedure Laterality Date    COLONOSCOPY  January 2022    ERCP,DIAGNOSTIC  03/06/24    KNEE REPLACEMENT SURGERY      OTHER SURGICAL HISTORY  01/01/1982    left knee/\"removed a band\"      Family History   Problem Relation Age of Onset    Diabetes Mother         prediabetes    Hypertension Mother     Lipids Mother     Breast Cancer Mother     Uterine Cancer Mother     Colon Polyps Mother     Cancer Father          abdominal: pancreas vs stomach    Hypertension Father     Hypertension Brother     Lipids Brother     Hypertension Brother     Heart Disorder Maternal Grandmother     Hypertension Maternal Grandmother     Cancer Maternal Grandfather         bone cancer    Other (Other[other]) Paternal Grandmother         emphysema    Other (Other[other]) Daughter         cerebral palsy    Prostate Cancer Paternal Uncle       Social History     Socioeconomic History    Marital status:    Tobacco Use    Smoking status: Former     Packs/day: 1.00     Years: 18.00     Additional pack years: 0.00     Total pack years: 18.00     Types: Cigarettes     Quit date: 10/1/2004     Years since quittin.4    Smokeless tobacco: Former     Types: Chew     Quit date: 1985   Substance and Sexual Activity    Alcohol use: Yes     Alcohol/week: 4.0 standard drinks of alcohol     Types: 2 Cans of beer, 2 Standard drinks or equivalent per week     Comment: 3-4drinks/wk    Drug use: Never    Sexual activity: Yes     Partners: Female   Other Topics Concern    Caffeine Concern Yes     Comment: 3x/day    Exercise Yes     Comment: walk, bike, run weights     Social Determinants of Health     Financial Resource Strain: Low Risk  (3/8/2024)    Financial Resource Strain     Difficulty of Paying Living Expenses: Not very hard   Food Insecurity: No Food Insecurity (3/3/2024)    Food Insecurity     Food Insecurity: Never true   Transportation Needs: No Transportation Needs (3/8/2024)    Transportation Needs     Lack of Transportation: No   Housing Stability: Low Risk  (3/3/2024)    Housing Stability     Housing Instability: No         REVIEW OF SYSTEMS:   GENERAL: feels well otherwise  HEENT: not congested, as above in HPI  LUNGS: denies shortness of breath with exertion  CARDIOVASCULAR: denies chest pain on exertion  GI: no nausea or abdominal pain, appetite normal  NEURO: denies current headaches    EXAM:   /72   Pulse 71   Temp 98.1 °F  (36.7 °C)   Resp 16   Ht 5' 6\" (1.676 m)   Wt 150 lb (68 kg)   SpO2 97%   BMI 24.21 kg/m²   GENERAL: well developed, well nourished,in no apparent distress  HEENT: atraumatic, normocephalic,ears full and clear, nares with minimal mucus, throat reddened with mucus present. Uvuvla midline.    NECK: supple,anterior cervical adenopathy  LUNGS: clear to auscultation  CARDIO: RRR without murmur  Results for orders placed or performed in visit on 03/25/24   Strep A Assay W/Optic    Collection Time: 03/25/24 10:10 AM   Result Value Ref Range    Strep Grp A Screen positive Negative    Control Line Present with a clear background (yes/no) yes Yes/No    Kit Lot # [id number] Numeric    Kit Expiration Date 4/22/25 Date           ASSESSMENT AND PLAN:    PLAN:Mateo was seen today for ear pain.    Diagnoses and all orders for this visit:    Sore throat  -     Strep A Assay W/Optic    Strep pharyngitis  -     amoxicillin 875 MG Oral Tab; Take 1 tablet (875 mg total) by mouth 2 (two) times daily for 10 days.      Positive rapid strep. Self care discussed. Medication use and risk/benefit discussed. Patient is advised to follow up with PCP if not improving with treatment plan or seek immediate care if symptoms worsen.  The patient indicates understanding of these issues and agrees to the plan.  Patient Instructions   Please start Amoxicillin, as discussed. Finish all the medication even if you feel better.  Probiotics or yogurt taken daily will help replace good bacteria to the gut with antibiotic use.  You may take Tylenol or Ibuprofen over the counter for comfort and swelling as directed.  Salt water gargles (1/2 coffee mug full of water, microwave for one minute, add 1 teaspoon of salt and swish, gargle, spit until empty) will help reduce pain and swelling.  You are considered contagious until 24 hours after the start of your antibiotics. Avoid work or school during this time.  Remove your toothbrush and toothpaste from shared  areas, do not share towels or glasses, wash all dishes in hot soapy water.  Throw out your toothbrush in three days to avoid recontamination.  Hydrate with hot or cold drinks for comfort, wash hands or use hand hygiene often. Cover your cough or sneeze.  Follow up in two weeks for recheck if not 100% improved; seek immediate care if shortness of breath, inability to swallow saliva or breathe.

## 2024-03-27 ENCOUNTER — MED REC SCAN ONLY (OUTPATIENT)
Dept: FAMILY MEDICINE CLINIC | Facility: CLINIC | Age: 54
End: 2024-03-27

## 2024-03-28 ENCOUNTER — OFFICE VISIT (OUTPATIENT)
Dept: HEMATOLOGY/ONCOLOGY | Facility: HOSPITAL | Age: 54
End: 2024-03-28
Attending: INTERNAL MEDICINE
Payer: COMMERCIAL

## 2024-03-28 VITALS
SYSTOLIC BLOOD PRESSURE: 131 MMHG | DIASTOLIC BLOOD PRESSURE: 83 MMHG | RESPIRATION RATE: 16 BRPM | WEIGHT: 154 LBS | OXYGEN SATURATION: 97 % | TEMPERATURE: 97 F | BODY MASS INDEX: 25 KG/M2 | HEART RATE: 96 BPM

## 2024-03-28 DIAGNOSIS — C25.0 CANCER OF HEAD OF PANCREAS (HCC): Primary | ICD-10-CM

## 2024-03-28 PROCEDURE — 99205 OFFICE O/P NEW HI 60 MIN: CPT | Performed by: INTERNAL MEDICINE

## 2024-03-28 NOTE — PROGRESS NOTES
[facility] Note    Diagnosis: cT1 cN0 cM0 Pancreatic Head Adenocarcinoma     Treatment History: Pending     Visit Diagnosis:  1. Cancer of head of pancreas (HCC)        History of Present Illness: 53M referred by Dr. Reyez to discuss pancreatic adenocarcinoma    -02/2024: Noted to have recurrent stomach pain, RUQ, radiating to back. US showed dilated CBD. CT with distended GB and CBD dilation. He was admitted for biliary pancreatitis. Follow up MRCP confirmed CBD dilation but no mass.    -ERCP/EUS in 03/2024: small pancreatic head mass, 1.8 cm. Stent placed. Path with adenocarcinoma.  23.9.     -CT Pancreas protocol at [facility] without vessel involvement     -FH of cancer: Mom with breast and uterine in 60-70s, father with gastric or pancreatic    -Surgery planned for 4/9/24 with Dr. Corral    Review of Systems: 12 Point ROS was completed and pertinent positives are in the HPI    Current Outpatient Medications on File Prior to Visit   Medication Sig Dispense Refill    docusate sodium 50 MG Oral Cap Take 2 capsules (100 mg total) by mouth every morning.      amoxicillin 875 MG Oral Tab Take 1 tablet (875 mg total) by mouth 2 (two) times daily for 10 days. 20 tablet 0    Zinc 100 MG Oral Tab       TURMERIC CURCUMIN OR       Cholecalciferol (VITAMIN D) 25 MCG (1000 UT) Oral Tab       Lactobacillus (PROBIOTIC ACIDOPHILUS) Oral Cap Take by mouth.      Multiple Vitamins-Minerals (MULTIVITAMIN OR) Take 1 tablet by mouth daily.         Omega-3 Fatty Acids (FISH OIL) 1200 MG Oral Cap Take 1 capsule by mouth daily.      Loratadine 10 MG Oral Cap Take 1 capsule by mouth daily as needed.      HYDROcodone-acetaminophen (NORCO)  MG Oral Tab Take 1 tablet by mouth every 8 (eight) hours as needed for Pain. (Patient not taking: Reported on 3/28/2024) 30 tablet 0    HYDROcodone-acetaminophen 5-325 MG Oral Tab Take 1 tablet by mouth every 6 (six) hours as needed. (Patient not  taking: Reported on 3/28/2024) 30 tablet 0    raNITIdine HCl (ZANTAC OR) Inject as directed. (Patient not taking: Reported on 3/25/2024)       Current Facility-Administered Medications on File Prior to Visit   Medication Dose Route Frequency Provider Last Rate Last Admin    [COMPLETED] ketorolac (Toradol) 30 MG/ML injection 30 mg  30 mg Intravenous Once Chaka Bai MD   30 mg at 24 0940    [COMPLETED] gadoterate meglumine (Dotarem) 7.5 MMOL/15ML injection 15 mL  15 mL Intravenous ONCE PRN Rene Walker MD   14 mL at 24 2308    [COMPLETED] iopamidol 76% (ISOVUE-370) injection for power injector  85 mL Intravenous ONCE PRN Porfirio Osborn DO   85 mL at 24 1907    [COMPLETED] sodium chloride 0.9 % IV bolus 1,000 mL  1,000 mL Intravenous Once Porfirio Osborn DO   Stopped at 24 2222    [COMPLETED] sodium chloride 0.9% infusion 1,000 mL  1,000 mL Intravenous Once Porfirio Osborn  mL/hr at 24 1936 1,000 mL at 24 1936    [COMPLETED] HYDROmorphone (Dilaudid) 1 MG/ML injection 0.5 mg  0.5 mg Intravenous Once Porfirio Osborn DO   0.5 mg at 24 2224     Past Medical History:   Diagnosis Date    Abdominal pain 24    Anxiety 2005    Belching 2005    Bloating     Disorder of prostate 2016    Slightly enlarged    Flatulence/gas pain/belching 2005    Food intolerance     Headache disorder     Hemorrhoids     Stented coronary artery 24    Stress 2005    Wears glasses      Past Surgical History:   Procedure Laterality Date    COLONOSCOPY  2022    ERCP,DIAGNOSTIC  24    OTHER SURGICAL HISTORY  1982    left knee/\"removed a band\"     Social History     Socioeconomic History    Marital status:    Tobacco Use    Smoking status: Former     Packs/day: 1.00     Years: 18.00     Additional pack years: 0.00     Total pack years: 18.00     Types: Cigarettes     Quit date: 10/1/2004     Years since quittin.5    Smokeless tobacco: Former      Types: Chew     Quit date: 1/1/1985   Substance and Sexual Activity    Alcohol use: Not Currently     Alcohol/week: 4.0 standard drinks of alcohol     Types: 2 Cans of beer, 2 Standard drinks or equivalent per week     Comment: 3-4drinks/wk    Drug use: Never    Sexual activity: Yes     Partners: Female      Family History   Problem Relation Age of Onset    Cancer Father         abdominal: pancreas vs stomach    Hypertension Father     Diabetes Mother         prediabetes    Hypertension Mother     Lipids Mother     Breast Cancer Mother     Uterine Cancer Mother     Colon Polyps Mother     Other (Other[other]) Daughter         cerebral palsy    Heart Disorder Maternal Grandmother     Hypertension Maternal Grandmother     Cancer Maternal Grandfather         bone cancer    Other (Other[other]) Paternal Grandmother         emphysema    Hypertension Brother     Lipids Brother     Hypertension Brother     Cancer Paternal Uncle         prostate    Prostate Cancer Paternal Uncle        Physical Exam  Height: --  Weight: 69.9 kg (154 lb) (03/28 1311)  BSA (Calculated - sq m): --  Pulse: 96 (03/28 1311)  BP: 131/83 (03/28 1311)  Temp: 96.9 °F (36.1 °C) (03/28 1311)  Do Not Use - Resp Rate: --  SpO2: 97 % (03/28 1311)    General: NAD, AOX3  HEENT: clear op, mmm, no jvd, no scleral icterus  CV: RR=  Extremities: No edema   Lungs: =no increased work of breathing  Abd: non distended   Neuro: CN: II-XII grossly intact      Results:  Lab Results   Component Value Date    WBC 6.7 03/07/2024    HGB 15.0 03/07/2024    HCT 42.4 03/07/2024    MCV 85.0 03/07/2024    .0 03/07/2024     Lab Results   Component Value Date     03/23/2024    K 4.2 03/23/2024    CO2 30.0 03/23/2024     03/23/2024    BUN 24 (H) 03/23/2024    GLUCOSE 89 08/02/2007    ALB 3.7 03/23/2024       No results found for: \"LDH\"    Radiology: reviewed     Pathology: reviewed     Assessment and Plan:  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  normal.  -CT chest and pancreas protocol pending  -referred to genetics   -Met with surgical oncology-recommended upfront surgery which I agree with  -We discussed that he will need adjuvant treatment with either mFOLFIRINOX or [place] pending final path. Discussed small possibility of adjuvant chemoRT  -Follow up 2 weeks post-op to discuss chemotherapy further and PORT    [facility]

## 2024-03-28 NOTE — PROGRESS NOTES
Patient here as a new consult. Met with surgical oncology. Will have surgery on 4/9/24. Has some occasional  pain. Worse being 5/10. Has norco if needed. Has lost about ten pounds in the past month. Appetite levels are lower than usual.

## 2024-04-12 ENCOUNTER — TELEPHONE (OUTPATIENT)
Dept: FAMILY MEDICINE CLINIC | Facility: CLINIC | Age: 54
End: 2024-04-12

## 2024-04-12 NOTE — TELEPHONE ENCOUNTER
Called [facility] and notified that Dr. Lanza is okay signing [facility] orders. No further question/concern at this time.

## 2024-04-12 NOTE — TELEPHONE ENCOUNTER
Marino,  ([facility]) called to notify Dr. Lanza that Pt was admitted in [facility]. She is asking if Dr. Lanza will sign [facility] order once Pt gets discharged. They will be faxing over paperwork. Fax number provided .

## 2024-04-19 ENCOUNTER — TELEPHONE (OUTPATIENT)
Dept: FAMILY MEDICINE CLINIC | Facility: CLINIC | Age: 54
End: 2024-04-19

## 2024-04-19 NOTE — TELEPHONE ENCOUNTER
Called and spoke w/ Pt to discuss paperwork we received from [facility] informing Dr. Lanza that \"there is no clinical documentation indicating this member is homebound, therefore home health services will NOT be authorized for this member.    Pt confirmed that home health came by his house yesterday 4/18/24 for evaluation.  Pt also confirmed that he is not currently homebound and is able to leave his house for appointments.    I told Pt to contact Dr. Lanza's office if there are any changes.  Pt verbalized understanding.    Paperwork is in \"patient form\" folder.

## 2024-04-29 ENCOUNTER — MED REC SCAN ONLY (OUTPATIENT)
Dept: FAMILY MEDICINE CLINIC | Facility: CLINIC | Age: 54
End: 2024-04-29

## 2024-04-30 ENCOUNTER — TELEPHONE (OUTPATIENT)
Dept: HEMATOLOGY/ONCOLOGY | Facility: HOSPITAL | Age: 54
End: 2024-04-30

## 2024-04-30 NOTE — TELEPHONE ENCOUNTER
Patient called.  He said Dr Corral will be sending Dr Carranza a new pathology report which may change the treatment plan. Patient may drop off a copy of the report.  Please call when received. Thank you.

## 2024-05-07 NOTE — PROGRESS NOTES
[facility] Note    Diagnosis:   Distal CBD Cholangiocardinoma, pT3 pN0 cM0 MMR intact     Treatment History:   Whipple with  Dr. Corral on 4/9/24    Visit Diagnosis:  1. Cancer of head of pancreas (HCC)    2. Cholangiocarcinoma (HCC)          History of Present Illness: 53M referred by Dr. Reyez to discuss pancreatic adenocarcinoma    -02/2024: Noted to have recurrent stomach pain, RUQ, radiating to back. US showed dilated CBD. CT with distended GB and CBD dilation. He was admitted for biliary pancreatitis. Follow up MRCP confirmed CBD dilation but no mass.    -ERCP/EUS in 03/2024: small pancreatic head mass, 1.8 cm. Stent placed. Path with adenocarcinoma.  23.9.     -CT Pancreas protocol at [facility] without vessel involvement     -FH of cancer: Mom with breast and uterine in 60-70s, father with gastric or pancreatic    -Surgery with Dr. 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 LVI/PNI present. Negative margins, 0/17 LN. [place]. Of note, omentum negative. pT3 pN0. Case reviewed at [facility]-recommend adjuvant Xeloda.     Interval History  -Discussed above history and recent surgery/path   -Doing well post-op. Having regular BMs    Review of Systems: 12 Point ROS was completed and pertinent positives are in the HPI    Current Outpatient Medications on File Prior to Visit   Medication Sig Dispense Refill    enoxaparin 40 MG/0.4ML Injection Solution Prefilled Syringe Inject 0.4 mL (40 mg total) into the skin.      pantoprazole 40 MG Oral Tab EC Take 1 tablet (40 mg total) by mouth before breakfast.      docusate sodium 50 MG Oral Cap Take 2 capsules (100 mg total) by mouth every morning.      Zinc 100 MG Oral Tab       TURMERIC CURCUMIN OR       Cholecalciferol (VITAMIN D) 25 MCG (1000 UT) Oral Tab       Lactobacillus (PROBIOTIC ACIDOPHILUS) Oral Cap Take by mouth.       Multiple Vitamins-Minerals (MULTIVITAMIN OR) Take 1 tablet by mouth daily.         Omega-3 Fatty Acids (FISH OIL) 1200 MG Oral Cap Take 1 capsule by mouth daily.      HYDROcodone-acetaminophen (NORCO)  MG Oral Tab Take 1 tablet by mouth every 8 (eight) hours as needed for Pain. (Patient not taking: Reported on 3/28/2024) 30 tablet 0    HYDROcodone-acetaminophen 5-325 MG Oral Tab Take 1 tablet by mouth every 6 (six) hours as needed. (Patient not taking: Reported on 3/28/2024) 30 tablet 0    raNITIdine HCl (ZANTAC OR) Inject as directed. (Patient not taking: Reported on 3/25/2024)      Loratadine 10 MG Oral Cap Take 1 capsule by mouth daily as needed. (Patient not taking: Reported on 2024)       No current facility-administered medications on file prior to visit.     Past Medical History:    Abdominal pain    Anxiety    Belching    Bloating    Disorder of prostate    Slightly enlarged    Flatulence/gas pain/belching    Food intolerance    Headache disorder    Hemorrhoids    Stented coronary artery    Stress    Wears glasses     Past Surgical History:   Procedure Laterality Date    Colonoscopy  2022    Ercp,diagnostic  24    Other surgical history  1982    left knee/\"removed a band\"     Social History     Socioeconomic History    Marital status:    Tobacco Use    Smoking status: Former     Current packs/day: 0.00     Average packs/day: 1 pack/day for 18.0 years (18.0 ttl pk-yrs)     Types: Cigarettes     Start date: 10/1/1986     Quit date: 10/1/2004     Years since quittin.6    Smokeless tobacco: Former     Types: Chew     Quit date: 1985   Substance and Sexual Activity    Alcohol use: Not Currently     Alcohol/week: 4.0 standard drinks of alcohol     Types: 2 Cans of beer, 2 Standard drinks or equivalent per week     Comment: 3-4drinks/wk    Drug use: Never    Sexual activity: Yes     Partners: Female      Family History   Problem Relation Age of Onset    Cancer Father          abdominal: pancreas vs stomach    Hypertension Father     Diabetes Mother         prediabetes    Hypertension Mother     Lipids Mother     Breast Cancer Mother     Uterine Cancer Mother     Colon Polyps Mother     Other (Other[other]) Daughter         cerebral palsy    Heart Disorder Maternal Grandmother     Hypertension Maternal Grandmother     Cancer Maternal Grandfather         bone cancer    Other (Other[other]) Paternal Grandmother         emphysema    Hypertension Brother     Lipids Brother     Hypertension Brother     Cancer Paternal Uncle         prostate    Prostate Cancer Paternal Uncle        Physical Exam  Height: --  Weight: 68 kg (150 lb) (05/08 0932)  BSA (Calculated - sq m): --  Pulse: 76 (05/08 0932)  BP: 117/76 (05/08 0932)  Temp: 97.6 °F (36.4 °C) (05/08 0932)  Do Not Use - Resp Rate: --  SpO2: 98 % (05/08 0932)    General: NAD, AOX3  HEENT: clear op, mmm, no jvd, no scleral icterus  CV: RR  Extremities: No edema   Lungs: =no increased work of breathing  Abd: non distended   Neuro: CN: II-XII grossly intact      Results:  Lab Results   Component Value Date    WBC 6.7 03/07/2024    HGB 15.0 03/07/2024    HCT 42.4 03/07/2024    MCV 85.0 03/07/2024    .0 03/07/2024     Lab Results   Component Value Date     03/23/2024    K 4.2 03/23/2024    CO2 30.0 03/23/2024     03/23/2024    BUN 24 (H) 03/23/2024    GLUCOSE 89 08/02/2007    ALB 3.7 03/23/2024       No results found for: \"LDH\"    Radiology: reviewed     Pathology: reviewed   Final Diagnosis    A. Omentum, omentectomy:  - Adipose tissues of the omentum, negative for metastasis.  - Incidental lymph node, negative for metastasis (0\1).     B. Gallbladder, cholecystectomy:  - Chronic cholecystitis.     C. Common bile duct margin:  - Negative for malignancy.     D. Pancreas margin:  - Negative for malignancy.     E. Retroperitoneal (uncinate) pancreas margin:  - Negative for malignancy.     F. Pancreas and duodenum, Whipple  procedure:  - Moderately differentiated cholangiocarcinoma, 3.4 cm, involving the pancreas and duodenal muscularis propria , see summary form.  - LVSI and perineural invasion present.  - Proximal and distal small bowel margins of resection are negative.  - 17 lymph nodes are negative for metastasis (0\17).       Assessment and Plan:  Distal CBD Cholangiocardinoma, pT3 pN0 cM0 MMR intact   -Normal   -S/P Whipple with Dr. Corral on 4/9/24  -We discussed adjuvant treatment with Xeloda 2000 mg BID D1-14 q21 days for 6 months. Plan for chemo ED  -Repeat imaging in 3 months  -Urea cream ordered  -stop PPI once he starts Xeloda  -referred to genetics     [facility]

## 2024-05-08 ENCOUNTER — OFFICE VISIT (OUTPATIENT)
Dept: HEMATOLOGY/ONCOLOGY | Facility: HOSPITAL | Age: 54
End: 2024-05-08
Attending: INTERNAL MEDICINE
Payer: COMMERCIAL

## 2024-05-08 VITALS
DIASTOLIC BLOOD PRESSURE: 76 MMHG | WEIGHT: 150 LBS | HEART RATE: 76 BPM | SYSTOLIC BLOOD PRESSURE: 117 MMHG | TEMPERATURE: 98 F | OXYGEN SATURATION: 98 % | BODY MASS INDEX: 24 KG/M2 | RESPIRATION RATE: 16 BRPM

## 2024-05-08 DIAGNOSIS — C25.0 CANCER OF HEAD OF PANCREAS (HCC): Primary | ICD-10-CM

## 2024-05-08 DIAGNOSIS — C22.1 CHOLANGIOCARCINOMA (HCC): ICD-10-CM

## 2024-05-08 PROCEDURE — 99215 OFFICE O/P EST HI 40 MIN: CPT | Performed by: INTERNAL MEDICINE

## 2024-05-08 RX ORDER — ENOXAPARIN SODIUM 100 MG/ML
40 INJECTION SUBCUTANEOUS
COMMUNITY
Start: 2024-04-13 | End: 2024-05-11

## 2024-05-08 RX ORDER — UREA 200 MG/G
CREAM TOPICAL
Qty: 480 G | Refills: 0 | Status: SHIPPED | OUTPATIENT
Start: 2024-05-08

## 2024-05-08 RX ORDER — PANTOPRAZOLE SODIUM 40 MG/1
1 TABLET, DELAYED RELEASE ORAL
COMMUNITY
Start: 2024-04-14

## 2024-05-08 RX ORDER — CAPECITABINE 500 MG/1
2000 TABLET, FILM COATED ORAL 2 TIMES DAILY
Qty: 112 TABLET | Refills: 8 | Status: SHIPPED | OUTPATIENT
Start: 2024-05-08 | End: 2024-05-13

## 2024-05-08 NOTE — PROGRESS NOTES
Patient here for follow-up. Had recent surgery 4/9/24. Remains on lovenox for now. Energy levels are low. Here to discuss next steps in plan of care.

## 2024-05-13 DIAGNOSIS — C22.1 CHOLANGIOCARCINOMA (HCC): Primary | ICD-10-CM

## 2024-05-13 RX ORDER — CAPECITABINE 500 MG/1
2000 TABLET, FILM COATED ORAL 2 TIMES DAILY
Qty: 112 TABLET | Refills: 8 | Status: SHIPPED | OUTPATIENT
Start: 2024-05-13 | End: 2024-05-27

## 2024-05-20 ENCOUNTER — OFFICE VISIT (OUTPATIENT)
Dept: HEMATOLOGY/ONCOLOGY | Facility: HOSPITAL | Age: 54
End: 2024-05-20
Attending: INTERNAL MEDICINE

## 2024-05-20 VITALS
SYSTOLIC BLOOD PRESSURE: 130 MMHG | RESPIRATION RATE: 16 BRPM | TEMPERATURE: 97 F | HEART RATE: 62 BPM | OXYGEN SATURATION: 97 % | WEIGHT: 154 LBS | BODY MASS INDEX: 25 KG/M2 | DIASTOLIC BLOOD PRESSURE: 79 MMHG

## 2024-05-20 DIAGNOSIS — Z71.9 HEALTH EDUCATION/COUNSELING: ICD-10-CM

## 2024-05-20 DIAGNOSIS — C22.1 CHOLANGIOCARCINOMA (HCC): Primary | ICD-10-CM

## 2024-05-20 LAB
ALBUMIN SERPL-MCNC: 3.7 G/DL (ref 3.4–5)
ALBUMIN/GLOB SERPL: 1.2 {RATIO} (ref 1–2)
ALP LIVER SERPL-CCNC: 96 U/L
ALT SERPL-CCNC: 41 U/L
ANION GAP SERPL CALC-SCNC: 4 MMOL/L (ref 0–18)
AST SERPL-CCNC: 21 U/L (ref 15–37)
BASOPHILS # BLD AUTO: 0.03 X10(3) UL (ref 0–0.2)
BASOPHILS NFR BLD AUTO: 0.5 %
BILIRUB SERPL-MCNC: 0.6 MG/DL (ref 0.1–2)
BUN BLD-MCNC: 26 MG/DL (ref 9–23)
CALCIUM BLD-MCNC: 9.6 MG/DL (ref 8.5–10.1)
CHLORIDE SERPL-SCNC: 109 MMOL/L (ref 98–112)
CO2 SERPL-SCNC: 28 MMOL/L (ref 21–32)
CREAT BLD-MCNC: 1.01 MG/DL
EGFRCR SERPLBLD CKD-EPI 2021: 87 ML/MIN/1.73M2 (ref 60–?)
EOSINOPHIL # BLD AUTO: 0.15 X10(3) UL (ref 0–0.7)
EOSINOPHIL NFR BLD AUTO: 2.6 %
ERYTHROCYTE [DISTWIDTH] IN BLOOD BY AUTOMATED COUNT: 13.3 %
FASTING STATUS PATIENT QL REPORTED: NO
GLOBULIN PLAS-MCNC: 3.1 G/DL (ref 2.8–4.4)
GLUCOSE BLD-MCNC: 117 MG/DL (ref 70–99)
HCT VFR BLD AUTO: 42.9 %
HGB BLD-MCNC: 14 G/DL
IMM GRANULOCYTES # BLD AUTO: 0.02 X10(3) UL (ref 0–1)
IMM GRANULOCYTES NFR BLD: 0.3 %
LYMPHOCYTES # BLD AUTO: 1.59 X10(3) UL (ref 1–4)
LYMPHOCYTES NFR BLD AUTO: 27.2 %
MCH RBC QN AUTO: 29 PG (ref 26–34)
MCHC RBC AUTO-ENTMCNC: 32.6 G/DL (ref 31–37)
MCV RBC AUTO: 89 FL
MONOCYTES # BLD AUTO: 0.53 X10(3) UL (ref 0.1–1)
MONOCYTES NFR BLD AUTO: 9.1 %
NEUTROPHILS # BLD AUTO: 3.53 X10 (3) UL (ref 1.5–7.7)
NEUTROPHILS # BLD AUTO: 3.53 X10(3) UL (ref 1.5–7.7)
NEUTROPHILS NFR BLD AUTO: 60.3 %
OSMOLALITY SERPL CALC.SUM OF ELEC: 298 MOSM/KG (ref 275–295)
PLATELET # BLD AUTO: 190 10(3)UL (ref 150–450)
POTASSIUM SERPL-SCNC: 4 MMOL/L (ref 3.5–5.1)
PROT SERPL-MCNC: 6.8 G/DL (ref 6.4–8.2)
RBC # BLD AUTO: 4.82 X10(6)UL
SODIUM SERPL-SCNC: 141 MMOL/L (ref 136–145)
WBC # BLD AUTO: 5.9 X10(3) UL (ref 4–11)

## 2024-05-20 PROCEDURE — 99417 PROLNG OP E/M EACH 15 MIN: CPT | Performed by: NURSE PRACTITIONER

## 2024-05-20 PROCEDURE — 99215 OFFICE O/P EST HI 40 MIN: CPT | Performed by: NURSE PRACTITIONER

## 2024-05-20 RX ORDER — PROCHLORPERAZINE MALEATE 10 MG
10 TABLET ORAL EVERY 6 HOURS PRN
Qty: 30 TABLET | Refills: 3 | Status: SHIPPED | OUTPATIENT
Start: 2024-05-20

## 2024-05-20 NOTE — PROGRESS NOTES
ORAL CHEMOTHERAPY EDUCATION RECORD  Learner:  Patient and patient's spouse     Diagnosis:   cholangiocarcinoma     Medication Name:   capecitabine    Dose:  2000mg         Frequency:  BID    Administration Guidelines:  Take With Food (within 30 minutes of a meal)                  Drug / Drug Interactions:   Patient will be stopping pantoprazole.     Start Date of Treatment:  5/21/2024    Chemo Toxicities:  Decreased red blood cells, decreased white blood cells, decreased platelet count, diarrhea, skin effects, heart effects, liver effects, kidney effects, fatigue, nausea/vomiting, hair loss, abdominal pain     When to Call the Office:  Fevers  - 100.5 or greater  Nausea /vomiting not controlled with anti-nausea medications  Diarrhea -not controlled with Imodium AD or more than 6 episodes in 24 hours  Constipation - No BM x 3 days, no responsive to stool softeners or laxatives  Shortness of Breath  Excessive fatigue or weakness  New onset rash  Sudden onset of any unexpected symptom - such as change in vision, sense of balance, dizziness or lightheadedness, swelling one arm or leg                  Safe Handling / Disposal:  This was reviewed with the patient and handout provided.                  Missed Dose Management:  Call office    Patient was assessed for readiness to adhere to an oral cancer treatment outside of the cancer center.     What Phone Number to Call: [phone]    Teaching Materials Provided:   Oral Chemotherapy information sheets  When to contact the Treatment Team Information Sheet  Side Effect Management Information Sheet  [facility] Dietician information sheet  [place] Support Services Sheet    Patient was given ample opportunity to ask questions. All questions and concerns addressed. We discussed self care techniques, symptom management, fluid, diet, and activities.     Chemotherapy Consent Form signed by the patient.  I spent a total of 45 minutes with the patient, 100 % of that time was spent  counseling patient regarding the above documented side effects and management, when to call provider and contact information.     Encounter Times  PreChartin minutes    Reviewing/Obtaining:   minutes      Medical Exam:   minutes    Plan:   minutes      Notes: 5 minutes    Counseling/Education: 45 minutes      Referring/Communicating:   minutes    Ind Interpretation: 5 minutes      Care Coordination:   minutes       My total time spent caring for the patient on the day of the encounter: 60 minutes.     Follow up: CBC in 1 week; APN with labs in 2 weeks; follow up with Dr. Carranza in 3 weeks for cycle 2    Sonia Alexandre, DNP, APRN, NP-C, AOCNP  Nurse Practitioner  [facility]

## 2024-05-28 ENCOUNTER — NURSE ONLY (OUTPATIENT)
Dept: HEMATOLOGY/ONCOLOGY | Facility: HOSPITAL | Age: 54
End: 2024-05-28
Attending: INTERNAL MEDICINE

## 2024-05-28 DIAGNOSIS — C22.1 CHOLANGIOCARCINOMA (HCC): ICD-10-CM

## 2024-05-28 LAB
ALBUMIN SERPL-MCNC: 3.7 G/DL (ref 3.4–5)
ALBUMIN/GLOB SERPL: 1.2 {RATIO} (ref 1–2)
ALP LIVER SERPL-CCNC: 98 U/L
ALT SERPL-CCNC: 41 U/L
ANION GAP SERPL CALC-SCNC: 5 MMOL/L (ref 0–18)
AST SERPL-CCNC: 20 U/L (ref 15–37)
BASOPHILS # BLD AUTO: 0.02 X10(3) UL (ref 0–0.2)
BASOPHILS NFR BLD AUTO: 0.3 %
BILIRUB SERPL-MCNC: 0.7 MG/DL (ref 0.1–2)
BUN BLD-MCNC: 23 MG/DL (ref 9–23)
CALCIUM BLD-MCNC: 9.3 MG/DL (ref 8.5–10.1)
CHLORIDE SERPL-SCNC: 108 MMOL/L (ref 98–112)
CO2 SERPL-SCNC: 25 MMOL/L (ref 21–32)
CREAT BLD-MCNC: 1.04 MG/DL
EGFRCR SERPLBLD CKD-EPI 2021: 84 ML/MIN/1.73M2 (ref 60–?)
EOSINOPHIL # BLD AUTO: 0.12 X10(3) UL (ref 0–0.7)
EOSINOPHIL NFR BLD AUTO: 2 %
ERYTHROCYTE [DISTWIDTH] IN BLOOD BY AUTOMATED COUNT: 13.1 %
FASTING STATUS PATIENT QL REPORTED: NO
GLOBULIN PLAS-MCNC: 3.2 G/DL (ref 2.8–4.4)
GLUCOSE BLD-MCNC: 106 MG/DL (ref 70–99)
HCT VFR BLD AUTO: 42.5 %
HGB BLD-MCNC: 14.2 G/DL
IMM GRANULOCYTES # BLD AUTO: 0.02 X10(3) UL (ref 0–1)
IMM GRANULOCYTES NFR BLD: 0.3 %
LYMPHOCYTES # BLD AUTO: 1.69 X10(3) UL (ref 1–4)
LYMPHOCYTES NFR BLD AUTO: 28.2 %
MCH RBC QN AUTO: 29 PG (ref 26–34)
MCHC RBC AUTO-ENTMCNC: 33.4 G/DL (ref 31–37)
MCV RBC AUTO: 86.9 FL
MONOCYTES # BLD AUTO: 0.52 X10(3) UL (ref 0.1–1)
MONOCYTES NFR BLD AUTO: 8.7 %
NEUTROPHILS # BLD AUTO: 3.62 X10 (3) UL (ref 1.5–7.7)
NEUTROPHILS # BLD AUTO: 3.62 X10(3) UL (ref 1.5–7.7)
NEUTROPHILS NFR BLD AUTO: 60.5 %
OSMOLALITY SERPL CALC.SUM OF ELEC: 290 MOSM/KG (ref 275–295)
PLATELET # BLD AUTO: 231 10(3)UL (ref 150–450)
POTASSIUM SERPL-SCNC: 3.8 MMOL/L (ref 3.5–5.1)
PROT SERPL-MCNC: 6.9 G/DL (ref 6.4–8.2)
RBC # BLD AUTO: 4.89 X10(6)UL
SODIUM SERPL-SCNC: 138 MMOL/L (ref 136–145)
WBC # BLD AUTO: 6 X10(3) UL (ref 4–11)

## 2024-05-28 PROCEDURE — 36415 COLL VENOUS BLD VENIPUNCTURE: CPT

## 2024-05-28 PROCEDURE — 85025 COMPLETE CBC W/AUTO DIFF WBC: CPT

## 2024-05-28 PROCEDURE — 80053 COMPREHEN METABOLIC PANEL: CPT

## 2024-05-29 ENCOUNTER — TELEPHONE (OUTPATIENT)
Dept: HEMATOLOGY/ONCOLOGY | Facility: HOSPITAL | Age: 54
End: 2024-05-29

## 2024-05-29 NOTE — TELEPHONE ENCOUNTER
I called and updated Mateo that Dr Carranza wants him to double his dose of pepcid and call his GI specialist, Dr Galarza and update him. Mateo verbalized understanding.

## 2024-05-29 NOTE — TELEPHONE ENCOUNTER
Toxicities: Capecitabine    Heartburn    Mateo called to report he was told to stop his pantoprazole when he started taking the capecitabine. He was told to take pepcid. He is doing that, but has \"very bad\" heartburn. He has also been taking Rolaids Ultra Strength, but that is not helping. Is there anything else he can take?    I asked Mateo to please hold. I attempted to reach the APRN's in [place] and [place]. Both were busy. I spoke with AMILCAR Grace for Dr Carranza. She asked me to send her the message and she will check with Dr Carranza. I updated Mateo and told him I will call him back.

## 2024-05-30 ENCOUNTER — TELEPHONE (OUTPATIENT)
Dept: HEMATOLOGY/ONCOLOGY | Facility: HOSPITAL | Age: 54
End: 2024-05-30

## 2024-05-30 RX ORDER — ONDANSETRON 8 MG/1
8 TABLET, ORALLY DISINTEGRATING ORAL EVERY 8 HOURS PRN
Qty: 30 TABLET | Refills: 0 | Status: SHIPPED | OUTPATIENT
Start: 2024-05-30

## 2024-05-30 NOTE — TELEPHONE ENCOUNTER
Called patient to let him know that Zofran sent and to reach out to GI.  He states he has left message but no call back yet.

## 2024-05-30 NOTE — TELEPHONE ENCOUNTER
Capecitabine    Patient complaints of stomach acid.  Had vomiting x 5 this am after eating.  Had been on Pantoprazole which he was told to hold,  has tried Tums, Pepcid increased  doses, Gaviscon, all did not help.    Denies fevers or chills, no sob or chest, denies diarrhea,   eating fairly well until this am with vomiting.  Denies lightheadedness or dizziness.  Nor urinary or bowel complaints..    Requesting a call back with instruction on stomach acid management.

## 2024-06-04 ENCOUNTER — OFFICE VISIT (OUTPATIENT)
Dept: HEMATOLOGY/ONCOLOGY | Facility: HOSPITAL | Age: 54
End: 2024-06-04
Attending: INTERNAL MEDICINE
Payer: COMMERCIAL

## 2024-06-04 VITALS
OXYGEN SATURATION: 98 % | WEIGHT: 151.19 LBS | BODY MASS INDEX: 24.3 KG/M2 | TEMPERATURE: 97 F | DIASTOLIC BLOOD PRESSURE: 77 MMHG | SYSTOLIC BLOOD PRESSURE: 132 MMHG | HEIGHT: 65.98 IN | HEART RATE: 62 BPM

## 2024-06-04 DIAGNOSIS — L27.1 HAND FOOT SYNDROME: Primary | ICD-10-CM

## 2024-06-04 DIAGNOSIS — C22.1 CHOLANGIOCARCINOMA (HCC): ICD-10-CM

## 2024-06-04 LAB
BASOPHILS # BLD AUTO: 0.03 X10(3) UL (ref 0–0.2)
BASOPHILS NFR BLD AUTO: 0.6 %
EOSINOPHIL # BLD AUTO: 0.15 X10(3) UL (ref 0–0.7)
EOSINOPHIL NFR BLD AUTO: 2.8 %
ERYTHROCYTE [DISTWIDTH] IN BLOOD BY AUTOMATED COUNT: 13.2 %
HCT VFR BLD AUTO: 41.2 %
HGB BLD-MCNC: 14.2 G/DL
IMM GRANULOCYTES # BLD AUTO: 0.02 X10(3) UL (ref 0–1)
IMM GRANULOCYTES NFR BLD: 0.4 %
LYMPHOCYTES # BLD AUTO: 1.5 X10(3) UL (ref 1–4)
LYMPHOCYTES NFR BLD AUTO: 27.8 %
MCH RBC QN AUTO: 30 PG (ref 26–34)
MCHC RBC AUTO-ENTMCNC: 34.5 G/DL (ref 31–37)
MCV RBC AUTO: 87.1 FL
MONOCYTES # BLD AUTO: 0.51 X10(3) UL (ref 0.1–1)
MONOCYTES NFR BLD AUTO: 9.4 %
NEUTROPHILS # BLD AUTO: 3.19 X10 (3) UL (ref 1.5–7.7)
NEUTROPHILS # BLD AUTO: 3.19 X10(3) UL (ref 1.5–7.7)
NEUTROPHILS NFR BLD AUTO: 59 %
PLATELET # BLD AUTO: 252 10(3)UL (ref 150–450)
RBC # BLD AUTO: 4.73 X10(6)UL
WBC # BLD AUTO: 5.4 X10(3) UL (ref 4–11)

## 2024-06-04 PROCEDURE — 99215 OFFICE O/P EST HI 40 MIN: CPT | Performed by: NURSE PRACTITIONER

## 2024-06-04 NOTE — PROGRESS NOTES
Chief Complaint   Patient presents with    Follow - Up     Pt is here for follow up on oral chemo - capecitabine. Appetite has been reduced on medication, forcing himself to eat. Had some nausea and vomiting with increased acid reflux; changed medications with relief. Hand redness and sensitivity to heat; heel sores to both feet; cracking to corners of mouth; bilateral flank pain and increased fatigue.    Education Record    Learner:  Patient    Disease / Diagnosis: cholangiocarcinoma    Barriers / Limitations:  None   Comments:    Method:  Brief focused   Comments:    General Topics:  Diet, Medication, Pain, Side effects and symptom management, and Plan of care reviewed   Comments:    Outcome:  Shows understanding   Comments:

## 2024-06-04 NOTE — PROGRESS NOTES
Cancer Center Progress Note    Patient Name: Mateo Byrd   YOB: 1970   Medical Record Number: [id number]   CSN: [id number]   Date of visit: 6/4/2024       Chief Complaint/Reason for Visit:  Chief Complaint   Patient presents with    Follow - Up        History of Present Illness: Patient presents today for follow up of cholangiocarcinoma. He started adjuvant capecitabine 2000mg BID on 5/21/2024. His medical oncologist is Dr. Dale Carranza, additional history below.     Patient reports that there was an inadequate amount of capecitabine pills that was sent from the pharmacy. He stopped two days early. He developed sores on heels with tenderness on feet and hands the same day he stopped the cycle. He was applying urea cream twice a day. He spoke to the on-call oncologist and was told to start Voltaren gel which improved the tenderness. Appetite was reduced while taking capecitabine. He also had mild nausea with vomiting. He believes the change in acid reflux medications may have contributed to this. He had mild diarrhea. He has dry cracked skin on corners of mouth.     Oncology History:    -02/2024: Noted to have recurrent stomach pain, RUQ, radiating to back. US showed dilated CBD. CT with distended GB and CBD dilation. He was admitted for biliary pancreatitis. Follow up MRCP confirmed CBD dilation but no mass.     -ERCP/EUS in 03/2024: small pancreatic head mass, 1.8 cm. Stent placed. Path with adenocarcinoma.  23.9.      -CT Pancreas protocol at [facility] without vessel involvement      -FH of cancer: Mom with breast and uterine in 60-70s, father with gastric or pancreatic     -Surgery with Dr. 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 LVI/PNI present. Negative margins, 0/17 LN. [place]OULOU. Of note, omentum negative. pT3 pN0. Case reviewed at [place] [facility]-recommend adjuvant Xeloda.        Problem List:  Patient Active Problem List   Diagnosis    Chronic prostatitis    Special screening for malignant neoplasm of colon    Family history of colonic polyps    Benign neoplasm of cecum    Benign neoplasm of ascending colon    Acute pancreatitis, unspecified complication status, unspecified pancreatitis type (HCC)    Elevated liver enzymes    Dilated cbd, acquired    Acute biliary pancreatitis (HCC)    Cancer of head of pancreas (HCC)    Cholangiocarcinoma (HCC)        Medical History:  Past Medical History:    Abdominal pain    Anxiety    Belching    Bloating    Disorder of prostate    Slightly enlarged    Flatulence/gas pain/belching    Food intolerance    Headache disorder    Hemorrhoids    Stented coronary artery    Stress    Wears glasses       Surgical History:  Past Surgical History:   Procedure Laterality Date    Colonoscopy  January 2022    Ercp,diagnostic  03/06/24    Other surgical history  01/01/1982    left knee/\"removed a band\"       Allergies:  Allergies   Allergen Reactions    Seasonal        Family History:  Family History   Problem Relation Age of Onset    Cancer Father         abdominal: pancreas vs stomach    Hypertension Father     Diabetes Mother         prediabetes    Hypertension Mother     Lipids Mother     Breast Cancer Mother     Uterine Cancer Mother     Colon Polyps Mother     Other (Other[other]) Daughter         cerebral palsy    Heart Disorder Maternal Grandmother     Hypertension Maternal Grandmother     Cancer Maternal Grandfather         bone cancer    Other (Other[other]) Paternal Grandmother         emphysema    Hypertension Brother     Lipids Brother     Hypertension Brother     Cancer Paternal Uncle         prostate    Prostate Cancer Paternal Uncle        Social History:  Social History     Socioeconomic History    Marital status:      Spouse name: Not on file    Number of children: Not on file    Years of education: Not on file    Highest education  level: Not on file   Occupational History    Not on file   Tobacco Use    Smoking status: Former     Current packs/day: 0.00     Average packs/day: 1 pack/day for 18.0 years (18.0 ttl pk-yrs)     Types: Cigarettes     Start date: 10/1/1986     Quit date: 10/1/2004     Years since quittin.6    Smokeless tobacco: Former     Types: Chew     Quit date: 1985   Vaping Use    Vaping status: Not on file   Substance and Sexual Activity    Alcohol use: Not Currently     Alcohol/week: 4.0 standard drinks of alcohol     Types: 2 Cans of beer, 2 Standard drinks or equivalent per week     Comment: 3-4drinks/wk    Drug use: Never    Sexual activity: Yes     Partners: Female   Other Topics Concern     Service Not Asked    Blood Transfusions Not Asked    Caffeine Concern Yes     Comment: 3x/day    Occupational Exposure Not Asked    Hobby Hazards Not Asked    Sleep Concern Not Asked    Stress Concern Not Asked    Weight Concern Not Asked    Special Diet Not Asked    Back Care Not Asked    Exercise Yes     Comment: walk, bike, run weights    Bike Helmet Not Asked    Seat Belt Not Asked    Self-Exams Not Asked   Social History Narrative    Not on file     Social Determinants of Health     Financial Resource Strain: Low Risk  (3/8/2024)    Financial Resource Strain     Difficulty of Paying Living Expenses: Not very hard     Med Affordability: Not on file   Food Insecurity: No Food Insecurity (2024)    Received from [facility]    Hunger Vital Sign     Worried About Running Out of Food in the Last Year: Never true     Ran Out of Food in the Last Year: Never true   Transportation Needs: No Transportation Needs (2024)    Received from [facility]    PRAPARE - Transportation     Lack of Transportation (Medical): No     Lack of Transportation (Non-Medical): No   Physical Activity: Not on file   Stress: Not on file   Social Connections: Not on file   Housing Stability: Low Risk   (4/9/2024)    Received from [facility]    Housing Stability Vital Sign     Unable to Pay for Housing in the Last Year: No     Number of Places Lived in the Last Year: 1     Unstable Housing in the Last Year: No       Medications:    Current Outpatient Medications:     ondansetron 8 MG Oral Tablet Dispersible, Take 1 tablet (8 mg total) by mouth every 8 (eight) hours as needed for Nausea., Disp: 30 tablet, Rfl: 0    famotidine 40 MG Oral Tab, Take 1 tablet (40 mg total) by mouth 2 (two) times daily as needed for Heartburn., Disp: 180 tablet, Rfl: 3    prochlorperazine (COMPAZINE) 10 mg tablet, Take 1 tablet (10 mg total) by mouth every 6 (six) hours as needed for Nausea., Disp: 30 tablet, Rfl: 3    capecitabine 500 MG Oral tab, Take 4 tablets (2,000 mg total) by mouth 2 (two) times daily for 14 days You will take this for 14 days every 21 days, Disp: 112 tablet, Rfl: 8    Urea 20 % External Cream, Apply to hands and feet twice a day, Disp: 480 g, Rfl: 0    Zinc 100 MG Oral Tab, , Disp: , Rfl:     TURMERIC CURCUMIN OR, , Disp: , Rfl:     Cholecalciferol (VITAMIN D) 25 MCG (1000 UT) Oral Tab, , Disp: , Rfl:     Lactobacillus (PROBIOTIC ACIDOPHILUS) Oral Cap, Take by mouth., Disp: , Rfl:     Multiple Vitamins-Minerals (MULTIVITAMIN OR), Take 1 tablet by mouth daily. , Disp: , Rfl:     Omega-3 Fatty Acids (FISH OIL) 1200 MG Oral Cap, Take 1 capsule by mouth daily., Disp: , Rfl:     pantoprazole 40 MG Oral Tab EC, Take 1 tablet (40 mg total) by mouth before breakfast. (Patient not taking: Reported on 6/4/2024), Disp: , Rfl:     Loratadine 10 MG Oral Cap, Take 1 capsule by mouth daily as needed. (Patient not taking: Reported on 5/8/2024), Disp: , Rfl:     Review of Systems:  A comprehensive 14 point review of systems was completed.  Pertinent positives and negatives noted in the HPI.    Performance Status: ECOG 0-1    Physical Examination:  General: Patient is alert and oriented x 3, not in acute  distress.  Vital Signs: Height: 167.6 cm (5' 5.98\") (06/04 0951)  Weight: 68.6 kg (151 lb 3.2 oz) (06/04 0951)  BSA (Calculated - sq m): 1.78 sq meters (06/04 0951)  Pulse: 62 (06/04 0951)  BP: 132/77 (06/04 0951)  Temp: 97.2 °F (36.2 °C) (06/04 0951)  Do Not Use - Resp Rate: --  SpO2: 98 % (06/04 0951)  HEENT: Anicteric, conjunctivae and sclerae clear, no oropharyngeal lesion/thrush, mucous membranes are moist   Chest: Clear to auscultation. Respirations unlabored.   Heart: Regular rate and rhythm.   Abdomen: Soft, non-distended, non-tender with present bowel sounds.  Extremities: No edema.  Neurological: Grossly intact.   Lymphatics: There is no palpable lymphadenopathy throughout in the cervical or supraclavicular regions.   Skin: trace redness on hands and heels, no open lesions  Psych/Depression: mood and affect are appropriate.     Labs:     Recent Results (from the past 72 hour(s))   CBC W/ DIFFERENTIAL    Collection Time: 06/04/24  8:44 AM   Result Value Ref Range    WBC 5.4 4.0 - 11.0 x10(3) uL    RBC 4.73 4.30 - 5.70 x10(6)uL    HGB 14.2 13.0 - 17.5 g/dL    HCT 41.2 39.0 - 53.0 %    .0 150.0 - 450.0 10(3)uL    MCV 87.1 80.0 - 100.0 fL    MCH 30.0 26.0 - 34.0 pg    MCHC 34.5 31.0 - 37.0 g/dL    RDW 13.2 %    Neutrophil Absolute Prelim 3.19 1.50 - 7.70 x10 (3) uL    Neutrophil Absolute 3.19 1.50 - 7.70 x10(3) uL    Lymphocyte Absolute 1.50 1.00 - 4.00 x10(3) uL    Monocyte Absolute 0.51 0.10 - 1.00 x10(3) uL    Eosinophil Absolute 0.15 0.00 - 0.70 x10(3) uL    Basophil Absolute 0.03 0.00 - 0.20 x10(3) uL    Immature Granulocyte Absolute 0.02 0.00 - 1.00 x10(3) uL    Neutrophil % 59.0 %    Lymphocyte % 27.8 %    Monocyte % 9.4 %    Eosinophil % 2.8 %    Basophil % 0.6 %    Immature Granulocyte % 0.4 %       Impression/Plan    Cholangiocarcinoma: distal CBD, [id number], normal . S/p Whipple surgery with Dr. Corral on 4/9/2024. Started adjuvant capecitabine 2000mg BID days 1-14 every 21 days on  5/21/2024 (only did day 1-12 due to partial fill from pharmacy, however would have instructed patient to stop cycle due to mild hand-foot syndrome). Reviewed trajectory of side effects and expected recovery. Can consider dose reduction for next cycle if side effects persist during cycle 2. Plan for 6 months of capecitabine. Repeat imaging in 3 months.     Hand foot syndrome: improving, continue urea cream and Voltaren gel PRN    Planned Follow Up: 1 week APN, labs; 2 weeks follow up with Dr. Carranza, labs    Risk Level: HIGH cholangiocarcinoma receiving chemotherapy requiring close monitoring     The 21st Century Cures Act makes medical notes like these available to patients in the interest of transparency. Please be advised this is a medical document. Medical documents are intended to carry relevant information, facts as evident, and the clinical opinion of the practitioner. The medical note is intended as peer to peer communication and may appear blunt or direct. It is written in medical language and may contain abbreviations or verbiage that are unfamiliar.     Electronically Signed by:    Sonia Alexandre, GRACE, APRN, NP-C, AOCNP  Nurse Practitioner  [facility]

## 2024-06-06 NOTE — PROGRESS NOTES
[facility] Note    Diagnosis:   Distal CBD Cholangiocardinoma, pT3 pN0 cM0 MMR intact     Treatment History:   Whipple with  Dr. Corral on 4/9/24    Visit Diagnosis:  1. Cholangiocarcinoma (HCC)        History of Present Illness: 53M referred by Dr. Reyez to discuss pancreatic adenocarcinoma    -02/2024: Noted to have recurrent stomach pain, RUQ, radiating to back. US showed dilated CBD. CT with distended GB and CBD dilation. He was admitted for biliary pancreatitis. Follow up MRCP confirmed CBD dilation but no mass.    -ERCP/EUS in 03/2024: small pancreatic head mass, 1.8 cm. Stent placed. Path with adenocarcinoma.  23.9.     -CT Pancreas protocol at [facility] without vessel involvement     -FH of cancer: Mom with breast and uterine in 60-70s, father with gastric or pancreatic    -Surgery with Dr. 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 LVI/PNI present. Negative margins, 0/17 LN. [place]OULOU. Of note, omentum negative. pT3 pN0. Case reviewed at [place] [facility]-recommend adjuvant Xeloda.     -Adjuvant Capecitabine   -C1: 5/21/24-only did 12 days since partial fill from pharmacy. 2000 mg BID D1-14 q21 days    -C2: 6/11/24-->DR to 1500 mg BID on 6/19/24    Interval History  -Had HFS with c1. Taking urea cream and voltaren. Hands are tolerable but fairly symptomatic in his feet-states that he feels like he is walking on glass  -Energy is decent     Review of Systems: 12 Point ROS was completed and pertinent positives are in the HPI    Current Outpatient Medications on File Prior to Visit   Medication Sig Dispense Refill    ondansetron 8 MG Oral Tablet Dispersible Take 1 tablet (8 mg total) by mouth every 8 (eight) hours as needed for Nausea. 30 tablet 0    famotidine 40 MG Oral Tab Take 1 tablet (40 mg total) by mouth 2 (two) times daily as needed for Heartburn. 180 tablet 3     capecitabine 500 MG Oral tab Take 4 tablets (2,000 mg total) by mouth 2 (two) times daily for 14 days You will take this for 14 days every 21 days 112 tablet 8    Urea 20 % External Cream Apply to hands and feet twice a day 480 g 0    Zinc 100 MG Oral Tab       TURMERIC CURCUMIN OR       Cholecalciferol (VITAMIN D) 25 MCG (1000 UT) Oral Tab       Lactobacillus (PROBIOTIC ACIDOPHILUS) Oral Cap Take by mouth.      Multiple Vitamins-Minerals (MULTIVITAMIN OR) Take 1 tablet by mouth daily.         prochlorperazine (COMPAZINE) 10 mg tablet Take 1 tablet (10 mg total) by mouth every 6 (six) hours as needed for Nausea. (Patient not taking: Reported on 2024) 30 tablet 3    Omega-3 Fatty Acids (FISH OIL) 1200 MG Oral Cap Take 1 capsule by mouth daily.      Loratadine 10 MG Oral Cap Take 1 capsule by mouth daily as needed. (Patient not taking: Reported on 2024)       No current facility-administered medications on file prior to visit.     Past Medical History:    Abdominal pain    Anxiety    Belching    Bloating    Disorder of prostate    Slightly enlarged    Flatulence/gas pain/belching    Food intolerance    Headache disorder    Hemorrhoids    Stented coronary artery    Stress    Wears glasses     Past Surgical History:   Procedure Laterality Date    Colonoscopy  2022    Ercp,diagnostic  24    Other surgical history  1982    left knee/\"removed a band\"     Social History     Socioeconomic History    Marital status:    Tobacco Use    Smoking status: Former     Current packs/day: 0.00     Average packs/day: 1 pack/day for 18.0 years (18.0 ttl pk-yrs)     Types: Cigarettes     Start date: 10/1/1986     Quit date: 10/1/2004     Years since quittin.7    Smokeless tobacco: Former     Types: Chew     Quit date: 1985   Substance and Sexual Activity    Alcohol use: Not Currently     Alcohol/week: 4.0 standard drinks of alcohol     Types: 2 Cans of beer, 2 Standard drinks or equivalent per  week     Comment: 3-4drinks/wk    Drug use: Never    Sexual activity: Yes     Partners: Female      Family History   Problem Relation Age of Onset    Cancer Father         abdominal: pancreas vs stomach    Hypertension Father     Diabetes Mother         prediabetes    Hypertension Mother     Lipids Mother     Breast Cancer Mother     Uterine Cancer Mother     Colon Polyps Mother     Other (Other[other]) Daughter         cerebral palsy    Heart Disorder Maternal Grandmother     Hypertension Maternal Grandmother     Cancer Maternal Grandfather         bone cancer    Other (Other[other]) Paternal Grandmother         emphysema    Hypertension Brother     Lipids Brother     Hypertension Brother     Cancer Paternal Uncle         prostate    Prostate Cancer Paternal Uncle        Physical Exam  Height: 167.6 cm (5' 5.98\") (06/19 0951)  Weight: 69.8 kg (153 lb 12.8 oz) (06/19 0951)  BSA (Calculated - sq m): 1.79 sq meters (06/19 0951)  Pulse: 64 (06/19 0951)  BP: 119/78 (06/19 0951)  Temp: 97.5 °F (36.4 °C) (06/19 0951)  Do Not Use - Resp Rate: --  SpO2: 98 % (06/19 0951)    General: NAD, AOX3  HEENT: clear op, mmm, no jvd, no scleral icterus  CV: RR  Extremities: No edema. Mild erythema in hands   Lungs: =no increased work of breathing  Abd: non distended   Neuro: CN: II-XII grossly intact      Results:  Lab Results   Component Value Date    WBC 5.2 06/11/2024    HGB 14.7 06/11/2024    HCT 44.2 06/11/2024    MCV 87.7 06/11/2024    .0 06/11/2024     Lab Results   Component Value Date     06/11/2024    K 4.0 06/11/2024    CO2 27.0 06/11/2024     06/11/2024    BUN 28 (H) 06/11/2024    GLUCOSE 89 08/02/2007    ALB 3.7 06/11/2024       No results found for: \"LDH\"    Radiology: reviewed     Pathology: reviewed   Final Diagnosis    A. Omentum, omentectomy:  - Adipose tissues of the omentum, negative for metastasis.  - Incidental lymph node, negative for metastasis (0\1).     B. Gallbladder, cholecystectomy:  -  Chronic cholecystitis.     C. Common bile duct margin:  - Negative for malignancy.     D. Pancreas margin:  - Negative for malignancy.     E. Retroperitoneal (uncinate) pancreas margin:  - Negative for malignancy.     F. Pancreas and duodenum, Whipple procedure:  - Moderately differentiated cholangiocarcinoma, 3.4 cm, involving the pancreas and duodenal muscularis propria , see summary form.  - LVSI and perineural invasion present.  - Proximal and distal small bowel margins of resection are negative.  - 17 lymph nodes are negative for metastasis (0\17).       Assessment and Plan:  Distal CBD Cholangiocardinoma, pT3 pN0 cM0 MMR intact   -Normal   -S/P Whipple with Dr. Corral on 4/9/24  -We discussed adjuvant treatment with Xeloda for 6 months.   -Given worsening HFS, we can DR Xeloda from 2000 mg to 1500 mg BID D1-14 q21 days  -Repeat imaging in 3 months (07/2024)  -Urea cream, Voltaren gel  -stop PPI once he starts Xeloda  -referred to genetics-has follow up    RTC on C3    [facility]

## 2024-06-10 RX ORDER — ONDANSETRON 8 MG/1
8 TABLET, ORALLY DISINTEGRATING ORAL EVERY 8 HOURS PRN
Qty: 30 TABLET | Refills: 0 | OUTPATIENT
Start: 2024-06-10

## 2024-06-11 ENCOUNTER — OFFICE VISIT (OUTPATIENT)
Dept: HEMATOLOGY/ONCOLOGY | Facility: HOSPITAL | Age: 54
End: 2024-06-11
Attending: INTERNAL MEDICINE
Payer: COMMERCIAL

## 2024-06-11 VITALS
HEART RATE: 61 BPM | WEIGHT: 154 LBS | DIASTOLIC BLOOD PRESSURE: 71 MMHG | OXYGEN SATURATION: 98 % | HEIGHT: 65.98 IN | TEMPERATURE: 97 F | SYSTOLIC BLOOD PRESSURE: 124 MMHG | RESPIRATION RATE: 16 BRPM | BODY MASS INDEX: 24.75 KG/M2

## 2024-06-11 DIAGNOSIS — L27.1 HAND FOOT SYNDROME: ICD-10-CM

## 2024-06-11 DIAGNOSIS — C22.1 CHOLANGIOCARCINOMA (HCC): Primary | ICD-10-CM

## 2024-06-11 LAB
ALBUMIN SERPL-MCNC: 3.7 G/DL (ref 3.4–5)
ALBUMIN/GLOB SERPL: 1.1 {RATIO} (ref 1–2)
ALP LIVER SERPL-CCNC: 125 U/L
ALT SERPL-CCNC: 38 U/L
ANION GAP SERPL CALC-SCNC: 4 MMOL/L (ref 0–18)
AST SERPL-CCNC: 15 U/L (ref 15–37)
BASOPHILS # BLD AUTO: 0.04 X10(3) UL (ref 0–0.2)
BASOPHILS NFR BLD AUTO: 0.8 %
BILIRUB SERPL-MCNC: 0.4 MG/DL (ref 0.1–2)
BUN BLD-MCNC: 28 MG/DL (ref 9–23)
CALCIUM BLD-MCNC: 9.5 MG/DL (ref 8.5–10.1)
CHLORIDE SERPL-SCNC: 110 MMOL/L (ref 98–112)
CO2 SERPL-SCNC: 27 MMOL/L (ref 21–32)
CREAT BLD-MCNC: 1.13 MG/DL
EGFRCR SERPLBLD CKD-EPI 2021: 76 ML/MIN/1.73M2 (ref 60–?)
EOSINOPHIL # BLD AUTO: 0.11 X10(3) UL (ref 0–0.7)
EOSINOPHIL NFR BLD AUTO: 2.1 %
ERYTHROCYTE [DISTWIDTH] IN BLOOD BY AUTOMATED COUNT: 14.6 %
GLOBULIN PLAS-MCNC: 3.3 G/DL (ref 2.8–4.4)
GLUCOSE BLD-MCNC: 78 MG/DL (ref 70–99)
HCT VFR BLD AUTO: 44.2 %
HGB BLD-MCNC: 14.7 G/DL
IMM GRANULOCYTES # BLD AUTO: 0.02 X10(3) UL (ref 0–1)
IMM GRANULOCYTES NFR BLD: 0.4 %
LYMPHOCYTES # BLD AUTO: 1.49 X10(3) UL (ref 1–4)
LYMPHOCYTES NFR BLD AUTO: 28.7 %
MCH RBC QN AUTO: 29.2 PG (ref 26–34)
MCHC RBC AUTO-ENTMCNC: 33.3 G/DL (ref 31–37)
MCV RBC AUTO: 87.7 FL
MONOCYTES # BLD AUTO: 0.67 X10(3) UL (ref 0.1–1)
MONOCYTES NFR BLD AUTO: 12.9 %
NEUTROPHILS # BLD AUTO: 2.87 X10 (3) UL (ref 1.5–7.7)
NEUTROPHILS # BLD AUTO: 2.87 X10(3) UL (ref 1.5–7.7)
NEUTROPHILS NFR BLD AUTO: 55.1 %
OSMOLALITY SERPL CALC.SUM OF ELEC: 296 MOSM/KG (ref 275–295)
PLATELET # BLD AUTO: 240 10(3)UL (ref 150–450)
POTASSIUM SERPL-SCNC: 4 MMOL/L (ref 3.5–5.1)
PROT SERPL-MCNC: 7 G/DL (ref 6.4–8.2)
RBC # BLD AUTO: 5.04 X10(6)UL
SODIUM SERPL-SCNC: 141 MMOL/L (ref 136–145)
WBC # BLD AUTO: 5.2 X10(3) UL (ref 4–11)

## 2024-06-11 PROCEDURE — 99215 OFFICE O/P EST HI 40 MIN: CPT | Performed by: NURSE PRACTITIONER

## 2024-06-11 NOTE — PROGRESS NOTES
Chief Complaint   Patient presents with    Follow - Up     Pt is here for oral chemo follow up - capecitabine. He completed his off week and will start his cycle of 2 weeks on today. He reports that yesterday was the \"best he has felt\". Energy improved in off week; hands are better, heels are less sore.    Education Record    Learner:  Patient    Disease / Diagnosis: cholangiocarcinoma    Barriers / Limitations:  None   Comments:    Method:  Brief focused   Comments:    General Topics:  Diet, Medication, Pain, Side effects and symptom management, and Plan of care reviewed   Comments:    Outcome:  Shows understanding   Comments:

## 2024-06-11 NOTE — PROGRESS NOTES
Cancer Center Progress Note    Patient Name: Mateo Byrd   YOB: 1970   Medical Record Number: [id number]    Date of visit: 6/11/2024     Chief Complaint/Reason for Visit:  Chief Complaint   Patient presents with    Follow - Up      History of Present Illness: Patient presents today for follow up of cholangiocarcinoma. He started adjuvant capecitabine 2000mg BID on 5/21/2024. His medical oncologist is Dr. Dale Carranza, additional history below.     Patient began cycle 2 of capecitabine today. During cycle 1 he experienced mild nausea with vomiting that may have been partially related to change in acid reflux medications. He developed sores on heels with tenderness on feet and hands the same day he stopped cycle 1. He was applying urea cream twice a day. He spoke to the on-call oncologist and was told to start Voltaren gel which improved the tenderness. Appetite was reduced while taking capecitabine. He reports in the past week that energy level has improved.     Oncology History:    -02/2024: Noted to have recurrent stomach pain, RUQ, radiating to back. US showed dilated CBD. CT with distended GB and CBD dilation. He was admitted for biliary pancreatitis. Follow up MRCP confirmed CBD dilation but no mass.     -ERCP/EUS in 03/2024: small pancreatic head mass, 1.8 cm. Stent placed. Path with adenocarcinoma.  23.9.      -CT Pancreas protocol at [facility] without vessel involvement      -FH of cancer: Mom with breast and uterine in 60-70s, father with gastric or pancreatic     -Surgery with Dr. 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 LVI/PNI present. Negative margins, 0/17 LN. LOULOU. Of note, omentum negative. pT3 pN0. Case reviewed at [facility]-recommend adjuvant Xeloda.       Problem List:  Patient Active Problem List   Diagnosis    Chronic prostatitis    Special screening  for malignant neoplasm of colon    Family history of colonic polyps    Benign neoplasm of cecum    Benign neoplasm of ascending colon    Acute pancreatitis, unspecified complication status, unspecified pancreatitis type (HCC)    Elevated liver enzymes    Dilated cbd, acquired    Acute biliary pancreatitis (HCC)    Cancer of head of pancreas (HCC)    Cholangiocarcinoma (HCC)        Medical History:  Past Medical History:    Abdominal pain    Anxiety    Belching    Bloating    Disorder of prostate    Slightly enlarged    Flatulence/gas pain/belching    Food intolerance    Headache disorder    Hemorrhoids    Stented coronary artery    Stress    Wears glasses       Surgical History:  Past Surgical History:   Procedure Laterality Date    Colonoscopy  January 2022    Ercp,diagnostic  03/06/24    Other surgical history  01/01/1982    left knee/\"removed a band\"       Allergies:  Allergies   Allergen Reactions    Seasonal        Family History:  Family History   Problem Relation Age of Onset    Cancer Father         abdominal: pancreas vs stomach    Hypertension Father     Diabetes Mother         prediabetes    Hypertension Mother     Lipids Mother     Breast Cancer Mother     Uterine Cancer Mother     Colon Polyps Mother     Other (Other[other]) Daughter         cerebral palsy    Heart Disorder Maternal Grandmother     Hypertension Maternal Grandmother     Cancer Maternal Grandfather         bone cancer    Other (Other[other]) Paternal Grandmother         emphysema    Hypertension Brother     Lipids Brother     Hypertension Brother     Cancer Paternal Uncle         prostate    Prostate Cancer Paternal Uncle        Social History:  Social History     Socioeconomic History    Marital status:      Spouse name: Not on file    Number of children: Not on file    Years of education: Not on file    Highest education level: Not on file   Occupational History    Not on file   Tobacco Use    Smoking status: Former     Current  packs/day: 0.00     Average packs/day: 1 pack/day for 18.0 years (18.0 ttl pk-yrs)     Types: Cigarettes     Start date: 10/1/1986     Quit date: 10/1/2004     Years since quittin.7    Smokeless tobacco: Former     Types: Chew     Quit date: 1985   Vaping Use    Vaping status: Not on file   Substance and Sexual Activity    Alcohol use: Not Currently     Alcohol/week: 4.0 standard drinks of alcohol     Types: 2 Cans of beer, 2 Standard drinks or equivalent per week     Comment: 3-4drinks/wk    Drug use: Never    Sexual activity: Yes     Partners: Female   Other Topics Concern     Service Not Asked    Blood Transfusions Not Asked    Caffeine Concern Yes     Comment: 3x/day    Occupational Exposure Not Asked    Hobby Hazards Not Asked    Sleep Concern Not Asked    Stress Concern Not Asked    Weight Concern Not Asked    Special Diet Not Asked    Back Care Not Asked    Exercise Yes     Comment: walk, bike, run weights    Bike Helmet Not Asked    Seat Belt Not Asked    Self-Exams Not Asked   Social History Narrative    Not on file     Social Determinants of Health     Financial Resource Strain: Low Risk  (3/8/2024)    Financial Resource Strain     Difficulty of Paying Living Expenses: Not very hard     Med Affordability: Not on file   Food Insecurity: No Food Insecurity (2024)    Received from [facility]    Hunger Vital Sign     Worried About Running Out of Food in the Last Year: Never true     Ran Out of Food in the Last Year: Never true   Transportation Needs: No Transportation Needs (2024)    Received from [facility]    PRAPARE - Transportation     Lack of Transportation (Medical): No     Lack of Transportation (Non-Medical): No   Physical Activity: Not on file   Stress: Not on file   Social Connections: Not on file   Housing Stability: Low Risk  (2024)    Received from [facility]    Housing Stability Vital Sign     Unable to Pay for  Housing in the Last Year: No     Number of Places Lived in the Last Year: 1     Unstable Housing in the Last Year: No       Medications:    Current Outpatient Medications:     famotidine 40 MG Oral Tab, Take 1 tablet (40 mg total) by mouth 2 (two) times daily as needed for Heartburn., Disp: 180 tablet, Rfl: 3    capecitabine 500 MG Oral tab, Take 4 tablets (2,000 mg total) by mouth 2 (two) times daily for 14 days You will take this for 14 days every 21 days, Disp: 112 tablet, Rfl: 8    Zinc 100 MG Oral Tab, , Disp: , Rfl:     TURMERIC CURCUMIN OR, , Disp: , Rfl:     Cholecalciferol (VITAMIN D) 25 MCG (1000 UT) Oral Tab, , Disp: , Rfl:     Lactobacillus (PROBIOTIC ACIDOPHILUS) Oral Cap, Take by mouth., Disp: , Rfl:     Multiple Vitamins-Minerals (MULTIVITAMIN OR), Take 1 tablet by mouth daily. , Disp: , Rfl:     Omega-3 Fatty Acids (FISH OIL) 1200 MG Oral Cap, Take 1 capsule by mouth daily., Disp: , Rfl:     ondansetron 8 MG Oral Tablet Dispersible, Take 1 tablet (8 mg total) by mouth every 8 (eight) hours as needed for Nausea. (Patient not taking: Reported on 6/11/2024), Disp: 30 tablet, Rfl: 0    prochlorperazine (COMPAZINE) 10 mg tablet, Take 1 tablet (10 mg total) by mouth every 6 (six) hours as needed for Nausea. (Patient not taking: Reported on 6/11/2024), Disp: 30 tablet, Rfl: 3    Urea 20 % External Cream, Apply to hands and feet twice a day, Disp: 480 g, Rfl: 0    Loratadine 10 MG Oral Cap, Take 1 capsule by mouth daily as needed. (Patient not taking: Reported on 5/8/2024), Disp: , Rfl:     Review of Systems:  A comprehensive 14 point review of systems was completed.  Pertinent positives and negatives noted in the HPI.    Performance Status: ECOG 0-1    Physical Examination:  General: Patient is alert and oriented x 3, not in acute distress.  Vital Signs: Height: 167.6 cm (5' 5.98\") (06/11 0838)  Weight: 69.9 kg (154 lb) (06/11 0838)  BSA (Calculated - sq m): 1.79 sq meters (06/11 0838)  Pulse: 61 (06/11  0838)  BP: 124/71 (06/11 0838)  Temp: 97.3 °F (36.3 °C) (06/11 0838)  Do Not Use - Resp Rate: --  SpO2: 98 % (06/11 0838)  HEENT: Anicteric, conjunctivae and sclerae clear, no oropharyngeal lesion/thrush, mucous membranes are moist   Chest: Clear to auscultation. Respirations unlabored.   Heart: Regular rate and rhythm.   Abdomen: Soft, non-distended, non-tender with present bowel sounds.  Extremities: No edema.  Neurological: Grossly intact.   Lymphatics: There is no palpable lymphadenopathy throughout in the cervical or supraclavicular regions.   Skin: trace redness on hands and heels, no open lesions  Psych/Depression: mood and affect are appropriate.     Labs:     Recent Results (from the past 72 hour(s))   COMP METABOLIC PANEL [E]    Collection Time: 06/11/24  8:33 AM   Result Value Ref Range    Glucose 78 70 - 99 mg/dL    Sodium 141 136 - 145 mmol/L    Potassium 4.0 3.5 - 5.1 mmol/L    Chloride 110 98 - 112 mmol/L    CO2 27.0 21.0 - 32.0 mmol/L    Anion Gap 4 0 - 18 mmol/L    BUN 28 (H) 9 - 23 mg/dL    Creatinine 1.13 0.70 - 1.30 mg/dL    Calcium, Total 9.5 8.5 - 10.1 mg/dL    Calculated Osmolality 296 (H) 275 - 295 mOsm/kg    eGFR-Cr 76 >=60 mL/min/1.73m2    AST 15 15 - 37 U/L    ALT 38 16 - 61 U/L    Alkaline Phosphatase 125 (H) 45 - 117 U/L    Bilirubin, Total 0.4 0.1 - 2.0 mg/dL    Total Protein 7.0 6.4 - 8.2 g/dL    Albumin 3.7 3.4 - 5.0 g/dL    Globulin  3.3 2.8 - 4.4 g/dL    A/G Ratio 1.1 1.0 - 2.0    Patient Fasting for CMP? Patient not present    CBC W/ DIFFERENTIAL    Collection Time: 06/11/24  8:33 AM   Result Value Ref Range    WBC 5.2 4.0 - 11.0 x10(3) uL    RBC 5.04 4.30 - 5.70 x10(6)uL    HGB 14.7 13.0 - 17.5 g/dL    HCT 44.2 39.0 - 53.0 %    .0 150.0 - 450.0 10(3)uL    MCV 87.7 80.0 - 100.0 fL    MCH 29.2 26.0 - 34.0 pg    MCHC 33.3 31.0 - 37.0 g/dL    RDW 14.6 %    Neutrophil Absolute Prelim 2.87 1.50 - 7.70 x10 (3) uL    Neutrophil Absolute 2.87 1.50 - 7.70 x10(3) uL    Lymphocyte  Absolute 1.49 1.00 - 4.00 x10(3) uL    Monocyte Absolute 0.67 0.10 - 1.00 x10(3) uL    Eosinophil Absolute 0.11 0.00 - 0.70 x10(3) uL    Basophil Absolute 0.04 0.00 - 0.20 x10(3) uL    Immature Granulocyte Absolute 0.02 0.00 - 1.00 x10(3) uL    Neutrophil % 55.1 %    Lymphocyte % 28.7 %    Monocyte % 12.9 %    Eosinophil % 2.1 %    Basophil % 0.8 %    Immature Granulocyte % 0.4 %       Impression/Plan    Cholangiocarcinoma: distal CBD, [id number], normal . S/p Whipple surgery with Dr. Corral on 4/9/2024. Started adjuvant capecitabine 2000mg BID days 1-14 every 21 days on 5/21/2024 (only did day 1-12 due to partial fill from pharmacy and hand foot mouth syndrome). Labs reviewed, proceed with cycle 2 today. Plan for 6 months of capecitabine. Repeat imaging in 3 months.     Hand foot syndrome: mostly resolved, continue urea cream and Voltaren gel PRN    Planned Follow Up: 6/19/2024 follow up with Dr. Carranza, labs    Risk Level: HIGH cholangiocarcinoma receiving chemotherapy requiring close monitoring     The 21st Century Cures Act makes medical notes like these available to patients in the interest of transparency. Please be advised this is a medical document. Medical documents are intended to carry relevant information, facts as evident, and the clinical opinion of the practitioner. The medical note is intended as peer to peer communication and may appear blunt or direct. It is written in medical language and may contain abbreviations or verbiage that are unfamiliar.     Electronically Signed by:    Sonia Alexandre, GRACE, APRN, NP-C, AOCNP  Nurse Practitioner  [facility]

## 2024-06-14 RX ORDER — ONDANSETRON 8 MG/1
8 TABLET, ORALLY DISINTEGRATING ORAL EVERY 8 HOURS PRN
Qty: 30 TABLET | Refills: 0 | Status: SHIPPED | OUTPATIENT
Start: 2024-06-14 | End: 2024-07-15

## 2024-06-17 ENCOUNTER — PATIENT MESSAGE (OUTPATIENT)
Dept: HEMATOLOGY/ONCOLOGY | Facility: HOSPITAL | Age: 54
End: 2024-06-17

## 2024-06-19 ENCOUNTER — OFFICE VISIT (OUTPATIENT)
Dept: HEMATOLOGY/ONCOLOGY | Facility: HOSPITAL | Age: 54
End: 2024-06-19
Attending: INTERNAL MEDICINE

## 2024-06-19 VITALS
SYSTOLIC BLOOD PRESSURE: 119 MMHG | HEIGHT: 65.98 IN | WEIGHT: 153.81 LBS | TEMPERATURE: 98 F | BODY MASS INDEX: 24.72 KG/M2 | HEART RATE: 64 BPM | RESPIRATION RATE: 18 BRPM | OXYGEN SATURATION: 98 % | DIASTOLIC BLOOD PRESSURE: 78 MMHG

## 2024-06-19 DIAGNOSIS — C22.1 CHOLANGIOCARCINOMA (HCC): ICD-10-CM

## 2024-06-19 LAB
ALBUMIN SERPL-MCNC: 3.7 G/DL (ref 3.4–5)
ALBUMIN/GLOB SERPL: 1.2 {RATIO} (ref 1–2)
ALP LIVER SERPL-CCNC: 104 U/L
ALT SERPL-CCNC: 51 U/L
ANION GAP SERPL CALC-SCNC: 5 MMOL/L (ref 0–18)
AST SERPL-CCNC: 27 U/L (ref 15–37)
BASOPHILS # BLD AUTO: 0.01 X10(3) UL (ref 0–0.2)
BASOPHILS NFR BLD AUTO: 0.2 %
BILIRUB SERPL-MCNC: 0.9 MG/DL (ref 0.1–2)
BUN BLD-MCNC: 24 MG/DL (ref 9–23)
CALCIUM BLD-MCNC: 9.2 MG/DL (ref 8.5–10.1)
CHLORIDE SERPL-SCNC: 109 MMOL/L (ref 98–112)
CO2 SERPL-SCNC: 24 MMOL/L (ref 21–32)
CREAT BLD-MCNC: 1.21 MG/DL
EGFRCR SERPLBLD CKD-EPI 2021: 70 ML/MIN/1.73M2 (ref 60–?)
EOSINOPHIL # BLD AUTO: 0.04 X10(3) UL (ref 0–0.7)
EOSINOPHIL NFR BLD AUTO: 0.7 %
ERYTHROCYTE [DISTWIDTH] IN BLOOD BY AUTOMATED COUNT: 14.5 %
GLOBULIN PLAS-MCNC: 3.2 G/DL (ref 2.8–4.4)
GLUCOSE BLD-MCNC: 109 MG/DL (ref 70–99)
HCT VFR BLD AUTO: 42 %
HGB BLD-MCNC: 14.3 G/DL
IMM GRANULOCYTES # BLD AUTO: 0.03 X10(3) UL (ref 0–1)
IMM GRANULOCYTES NFR BLD: 0.5 %
LYMPHOCYTES # BLD AUTO: 1.15 X10(3) UL (ref 1–4)
LYMPHOCYTES NFR BLD AUTO: 18.8 %
MCH RBC QN AUTO: 29.5 PG (ref 26–34)
MCHC RBC AUTO-ENTMCNC: 34 G/DL (ref 31–37)
MCV RBC AUTO: 86.6 FL
MONOCYTES # BLD AUTO: 0.47 X10(3) UL (ref 0.1–1)
MONOCYTES NFR BLD AUTO: 7.7 %
NEUTROPHILS # BLD AUTO: 4.42 X10 (3) UL (ref 1.5–7.7)
NEUTROPHILS # BLD AUTO: 4.42 X10(3) UL (ref 1.5–7.7)
NEUTROPHILS NFR BLD AUTO: 72.1 %
OSMOLALITY SERPL CALC.SUM OF ELEC: 291 MOSM/KG (ref 275–295)
PLATELET # BLD AUTO: 187 10(3)UL (ref 150–450)
POTASSIUM SERPL-SCNC: 3.9 MMOL/L (ref 3.5–5.1)
PROT SERPL-MCNC: 6.9 G/DL (ref 6.4–8.2)
RBC # BLD AUTO: 4.85 X10(6)UL
SODIUM SERPL-SCNC: 138 MMOL/L (ref 136–145)
WBC # BLD AUTO: 6.1 X10(3) UL (ref 4–11)

## 2024-06-19 PROCEDURE — 99215 OFFICE O/P EST HI 40 MIN: CPT | Performed by: INTERNAL MEDICINE

## 2024-06-19 NOTE — PROGRESS NOTES
Patient here for follow-up. Started Xeloda on 6/11/24. Having hand and foot redness and pain again despite using topicals. Energy levels are poor. Having nausea and diarrhea.

## 2024-07-10 ENCOUNTER — OFFICE VISIT (OUTPATIENT)
Dept: HEMATOLOGY/ONCOLOGY | Facility: HOSPITAL | Age: 54
End: 2024-07-10
Attending: INTERNAL MEDICINE
Payer: COMMERCIAL

## 2024-07-10 ENCOUNTER — GENETICS ENCOUNTER (OUTPATIENT)
Dept: GENETICS | Facility: HOSPITAL | Age: 54
End: 2024-07-10
Attending: INTERNAL MEDICINE
Payer: COMMERCIAL

## 2024-07-10 VITALS
TEMPERATURE: 97 F | BODY MASS INDEX: 24.59 KG/M2 | HEART RATE: 76 BPM | OXYGEN SATURATION: 98 % | HEIGHT: 65.98 IN | SYSTOLIC BLOOD PRESSURE: 111 MMHG | DIASTOLIC BLOOD PRESSURE: 76 MMHG | WEIGHT: 153 LBS | RESPIRATION RATE: 18 BRPM

## 2024-07-10 DIAGNOSIS — C25.0 CANCER OF HEAD OF PANCREAS (HCC): ICD-10-CM

## 2024-07-10 DIAGNOSIS — Z80.9 FAMILY HISTORY OF CANCER: ICD-10-CM

## 2024-07-10 DIAGNOSIS — Z80.3 FAMILY HISTORY OF BREAST CANCER: ICD-10-CM

## 2024-07-10 DIAGNOSIS — C22.1 CHOLANGIOCARCINOMA (HCC): Primary | ICD-10-CM

## 2024-07-10 DIAGNOSIS — Z80.0 FAMILY HISTORY OF MALIGNANT NEOPLASM OF DIGESTIVE ORGAN: ICD-10-CM

## 2024-07-10 DIAGNOSIS — Z80.49 FAMILY HISTORY OF MALIGNANT NEOPLASM OF UTERUS: ICD-10-CM

## 2024-07-10 DIAGNOSIS — L27.1 HAND FOOT SYNDROME: ICD-10-CM

## 2024-07-10 LAB
ALBUMIN SERPL-MCNC: 3.6 G/DL (ref 3.4–5)
ALBUMIN/GLOB SERPL: 1 {RATIO} (ref 1–2)
ALP LIVER SERPL-CCNC: 121 U/L
ALT SERPL-CCNC: 59 U/L
ANION GAP SERPL CALC-SCNC: 4 MMOL/L (ref 0–18)
AST SERPL-CCNC: 29 U/L (ref 15–37)
BASOPHILS # BLD AUTO: 0.04 X10(3) UL (ref 0–0.2)
BASOPHILS NFR BLD AUTO: 0.3 %
BILIRUB SERPL-MCNC: 0.6 MG/DL (ref 0.1–2)
BUN BLD-MCNC: 21 MG/DL (ref 9–23)
CALCIUM BLD-MCNC: 9.2 MG/DL (ref 8.5–10.1)
CHLORIDE SERPL-SCNC: 107 MMOL/L (ref 98–112)
CO2 SERPL-SCNC: 25 MMOL/L (ref 21–32)
CREAT BLD-MCNC: 1.12 MG/DL
EGFRCR SERPLBLD CKD-EPI 2021: 77 ML/MIN/1.73M2 (ref 60–?)
EOSINOPHIL # BLD AUTO: 0.07 X10(3) UL (ref 0–0.7)
EOSINOPHIL NFR BLD AUTO: 0.6 %
ERYTHROCYTE [DISTWIDTH] IN BLOOD BY AUTOMATED COUNT: 16 %
GLOBULIN PLAS-MCNC: 3.5 G/DL (ref 2.8–4.4)
GLUCOSE BLD-MCNC: 121 MG/DL (ref 70–99)
HCT VFR BLD AUTO: 43.8 %
HGB BLD-MCNC: 14.4 G/DL
IMM GRANULOCYTES # BLD AUTO: 0.04 X10(3) UL (ref 0–1)
IMM GRANULOCYTES NFR BLD: 0.3 %
LYMPHOCYTES # BLD AUTO: 2.82 X10(3) UL (ref 1–4)
LYMPHOCYTES NFR BLD AUTO: 24.2 %
MCH RBC QN AUTO: 29.3 PG (ref 26–34)
MCHC RBC AUTO-ENTMCNC: 32.9 G/DL (ref 31–37)
MCV RBC AUTO: 89 FL
MONOCYTES # BLD AUTO: 0.75 X10(3) UL (ref 0.1–1)
MONOCYTES NFR BLD AUTO: 6.4 %
NEUTROPHILS # BLD AUTO: 7.91 X10 (3) UL (ref 1.5–7.7)
NEUTROPHILS # BLD AUTO: 7.91 X10(3) UL (ref 1.5–7.7)
NEUTROPHILS NFR BLD AUTO: 68.2 %
OSMOLALITY SERPL CALC.SUM OF ELEC: 286 MOSM/KG (ref 275–295)
PLATELET # BLD AUTO: 236 10(3)UL (ref 150–450)
POTASSIUM SERPL-SCNC: 3.7 MMOL/L (ref 3.5–5.1)
PROT SERPL-MCNC: 7.1 G/DL (ref 6.4–8.2)
RBC # BLD AUTO: 4.92 X10(6)UL
SODIUM SERPL-SCNC: 136 MMOL/L (ref 136–145)
WBC # BLD AUTO: 11.6 X10(3) UL (ref 4–11)

## 2024-07-10 PROCEDURE — 96040 HC GENETIC COUNSELING EA 30 MIN: CPT | Performed by: GENETIC COUNSELOR, MS

## 2024-07-10 PROCEDURE — 36415 COLL VENOUS BLD VENIPUNCTURE: CPT

## 2024-07-10 PROCEDURE — 99215 OFFICE O/P EST HI 40 MIN: CPT | Performed by: NURSE PRACTITIONER

## 2024-07-10 RX ORDER — TRIAMCINOLONE ACETONIDE 1 MG/G
OINTMENT TOPICAL
COMMUNITY
Start: 2024-06-24

## 2024-07-10 NOTE — PROGRESS NOTES
Patient Name: Mateo Byrd  YOB: 1970  Date of Visit: 7/10/2024    Reason for visit: Mr. Byrd was seen for the purposes of genetic counseling due to his diagnosis of cholangiocarcinoma at age 53y and a family history of cancer. The purpose of this visit was to review information regarding genetic testing options for mutations in high penetrance cancer susceptibility genes.    Referring Provider: Anuj Reyez MD; Dale Carranza MD    Medical History: Mr. Byrd is a pleasant 54 year old male presenting with stage IIB (pT3, pN0, cM0) cholangiocarcinoma diagnosed on 3/6/2024 at age 53y. He is s/p Whipple with Dr. Corral on 2024 (0 LN) and is following with Dr. Carranza for chemo.    He denies any other cancer hx. He has had a colonoscopy in .       Relevant Family History: Mr. Byrd has 2 daughters (29y, 25y).    He has 1 brother (58y).    His mother (82y) was dx with uterine cancer in her early-60s s/p surgery and was then dx with breast cancer in her late-60s s/p surgery and RT, she has a h/o colon polyps. There were 2 maternal uncles, both  in their 80s, with no cancer hx reported in them or their children. The maternal grandmother  in her 40s dt a MI. The maternal grandfather  at 77y dt \"bone cancer\" dx in his 70s.     Mr. Byrd's father  at 56y dt stomach v.s. pancreatic cancer dx 9 months prior at 55y. There are 2 paternal aunts (1  in infancy, 1 is living ~70s) and 1 paternal uncle (living ~75y), the uncle has a h/o prostate cancer dx in his 70s. No cancer hx is reported in the paternal cousins. The paternal grandmother  at 86y with emphysema and a smoking hx. The paternal grandfather  ~44y, likely dt EtOH complications (started after WWII service).     The rest of the family history is negative for other significant genetic conditions, cancers, or birth defects of any kind. See scanned pedigree for full family history reported  during the session.    Mr. Byrd's maternal ethnicity is [place]/mixed- and his paternal ethnicity is [place]. He is unaware of any Ashkenazi [employer] heritage.    Summary: The majority of cancers are sporadic whereas approximately 10-30% of cases of cancer cases are attributed to familial factors such as unidentified low penetrance genes in the family or shared environmental factors.  Approximately 5-10% of cancers are related to a hereditary cancer syndrome.  Signs of a hereditary cancer syndrome include some rare cancers, common cancers occurring at unusually young ages, multiple primary cancers in the same individual, multiple colorectal polyps, or the same type of cancer or related cancers (e.g., breast and ovarian, colorectal and endometrial) in three or more individuals in the same lineage.     Cancer is usually caused by gene mutations that occur randomly in one or a few cells of the body. Such gene changes, called somatic mutations, may arise as a natural consequence of aging or when a cell’s DNA has been damaged. Acquired mutations are only present in some of the body’s cells, and they are not passed on from parents to their children.    However, in the small percentage of people with a hereditary cancer syndrome, the disease is due to a different type of mutation called a hereditary mutation, or germline mutation. These mutations are usually inherited from one or both of the person’s parents, and are present in nearly every cell of the body. Because hereditary mutations are present in the DNA of sperm and egg cells, they can be passed down in families. People who carry such hereditary mutations do not necessarily get cancer, but their risk of developing the disease at some point during their lifetime is higher than average. When a personal and/or family history doesn't clearly fit a single hereditary cancer syndrome, panel genetic testing examining multiple genes in which pathogenic mutations  cause hereditary cancer syndromes is appropriate.     If testing is performed, three results are possible: positive, negative, and variant of uncertain significance.  A positive result indicates a mutation has been identified, and there is an increased risk for the cancers associated with the specific gene.  Since mutations in most cancer susceptibility genes are inherited in an autosomal dominant fashion, siblings and children of individuals with a mutation have a 50% risk of carrying the mutation as well.      A negative test result would indicate that no mutation was identified in a cancer susceptibility gene.  While testing detects gene mutations, it is possible for a mutation to be present and go undetected.  It is also possible for a mutation to be located in a gene other than those being tested.     A variant of uncertain significance means that a change has been identified a cancer susceptibility gene; however, it is uncertain if the variant is pathogenic or a non-deleterious change.  With time, the variant may be reclassified as either positive or negative.    Since mutation identification is necessary, an affected family member is preferred in order to confirm that a mutation is indeed present in a particular family.  If the mutation can be identified in an affected individual, this information can be used to screen other at-risk individuals in the family.  Individuals who are positive for the same mutation would be expected to have a much greater risk for developing hereditary cancer during their lifetime than the general population and surveillance and management would be rigorous.  For those individuals who are found to not carry the mutation, risks for developing cancer during their lifetime would return to those expected for individuals in the general population.  It should be emphasized that absence of a mutation in an at-risk individual would not eliminate their risk for cancer, but simply return  them to the risk expected for the general population. If no affected individual is available for testing, a negative result, while reassuring, cannot be completely informative of the familial cancer risk as it is unknown whether no mutation was found because the individual tested is truly negative for the familial mutation or whether the familial mutation was unable to be detected by the genetic testing method used. In such cases, screening and follow-up should be guided by personal and family history.     The PREMM5 model is a clinical prediction algorithm that estimates the cumulative probability of an individual carrying a germline mutation in the MLH1, MSH2, MSH6, PMS2, or EPCAM genes. Mutations in these genes cause Koch syndrome, an inherited cancer predisposition syndrome. Using the PREMM5 model, Mr. Byrd was estimated to have a ~5.2% probability to have a germline mutation in one of the five Koch Syndrome genes. If the overall predicted probability is 2.5% or greater, referral for genetic evaluation is recommended.     Because Mr. Byrd was diagnosed with cholangiocarcinoma with a PREMM5 probability of >5%, and a family history of uterine cancer in his mother (dx early-60s), and pancreatic v.s. stomach cancer in his father (dx ~55y), genetic testing for mutations in high-penetrance cancer susceptibility genes is indicated based on the NCCN guidelines (Koch syndrome, V.2.2023).      Mr. Byrd appeared to understand the information presented. On the day of the visit Mr. Byrd elected to proceed with genetic testing for hereditary cancer syndromes. My office will call Mr. Byrd as soon as results are received; post-test counseling can be scheduled at that time. Thank you for allowing me to participate in the care of your patient; please do not hesitate to contact my office if you have any questions or concerns, [phone].    Plan:   Blood was drawn and sent out for Dann's August  syndrome panel in the setting of the multi-cancer + RNA panel (TAT: 2-3 weeks, 70 genes total).    The Genetics office will call Mr. Byrd when results are available.  Recommendations for Mr. Byrd and family members will depend the above genetic testing results.      Send to: Kathleen  Time spent with patient: 35 minutes      Trupti Pang MS, [facility]

## 2024-07-10 NOTE — PROGRESS NOTES
Chief Complaint   Patient presents with    Follow - Up     Pt is here for oral chemo follow up - capecitabine. He had been off of the medication due to side effects; reports improvement in HFS/nausea/diarrhea. Eating well, denies N,V,D,C. Discoloration in feet but no pain.    Education Record    Learner:  Patient    Disease / Diagnosis: cholangiocarcinoma    Barriers / Limitations:  None   Comments:    Method:  Brief focused   Comments:    General Topics:  Diet, Medication, Pain, Side effects and symptom management, and Plan of care reviewed   Comments:    Outcome:  Shows understanding   Comments:

## 2024-07-10 NOTE — PROGRESS NOTES
Cancer Center Progress Note    Patient Name: Mateo Byrd   YOB: 1970   Medical Record Number: [id number]   CSN: [id number]   Date of visit: 7/10/2024       Chief Complaint/Reason for Visit:  Chief Complaint   Patient presents with    Follow - Up      History of Present Illness: 53M referred by Dr. Reyez to discuss pancreatic adenocarcinoma     -02/2024: Noted to have recurrent stomach pain, RUQ, radiating to back. US showed dilated CBD. CT with distended GB and CBD dilation. He was admitted for biliary pancreatitis. Follow up MRCP confirmed CBD dilation but no mass.     -ERCP/EUS in 03/2024: small pancreatic head mass, 1.8 cm. Stent placed. Path with adenocarcinoma.  23.9.      -CT Pancreas protocol at [facility] without vessel involvement      -FH of cancer: Mom with breast and uterine in 60-70s, father with gastric or pancreatic     -Surgery with Dr. 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 LVI/PNI present. Negative margins, 0/17 LN. LOULOU. Of note, omentum negative. pT3 pN0. Case reviewed at [facility] [place] [facility]-recommend adjuvant Xeloda.      -Adjuvant Capecitabine     -C1: 5/21/24-only did 12 days since partial fill from pharmacy. 2000 mg BID D1-14 q21 days      -C2: 6/11/24-->DR to 1500 mg BID on 6/19/24    -C3 7/10/24 --> DR to 1000mg BID     History of Present Illness:   Mateo Byrd is a 54 year old patient of Dr. Carranza's with cholangiocarcinoma as above. He is on adjuvant capecitabine.  S/p C2 at reduced doses of 1500mg BID due to hand foot syndrome. Treatment has been on hold for about two weeks due to worsening symptoms. He reports severe pain to his hands, blister to his right hand. Had some dryness and cracking to feet. He has been using prescribed creams with improvement. Pain has subsided. He continues to have sensitivity to temperature with occasional sensation  changes to his fingers.   He reports nausea while taking medication, no vomiting, using antiemetics as needed.    Diarrhea also present, using imodium if needed.   Taste changes with decreased appetite, has been eating more since he has been off the medication.   No fevers or recent infections, no sob or cp. No cough.         Problem List:  Patient Active Problem List   Diagnosis    Chronic prostatitis    Special screening for malignant neoplasm of colon    Family history of colonic polyps    Benign neoplasm of cecum    Benign neoplasm of ascending colon    Acute pancreatitis, unspecified complication status, unspecified pancreatitis type (HCC)    Elevated liver enzymes    Dilated cbd, acquired    Acute biliary pancreatitis (HCC)    Cancer of head of pancreas (HCC)    Cholangiocarcinoma (HCC)        Medical History:  Past Medical History:    Abdominal pain    Anxiety    Belching    Bloating    Disorder of prostate    Slightly enlarged    Flatulence/gas pain/belching    Food intolerance    Headache disorder    Hemorrhoids    Stented coronary artery    Stress    Wears glasses       Surgical History:  Past Surgical History:   Procedure Laterality Date    Colonoscopy  January 2022    Ercp,diagnostic  03/06/24    Other surgical history  01/01/1982    left knee/\"removed a band\"       Allergies:  Allergies   Allergen Reactions    Seasonal        Family History:  Family History   Problem Relation Age of Onset    Cancer Father         abdominal: pancreas vs stomach    Hypertension Father     Diabetes Mother         prediabetes    Hypertension Mother     Lipids Mother     Breast Cancer Mother     Uterine Cancer Mother     Colon Polyps Mother     Other (Other[other]) Daughter         cerebral palsy    Heart Disorder Maternal Grandmother     Hypertension Maternal Grandmother     Cancer Maternal Grandfather         bone cancer    Other (Other[other]) Paternal Grandmother         emphysema    Hypertension Brother     Lipids  Brother     Hypertension Brother     Cancer Paternal Uncle         prostate    Prostate Cancer Paternal Uncle        Social History:  Social History     Socioeconomic History    Marital status:      Spouse name: Not on file    Number of children: Not on file    Years of education: Not on file    Highest education level: Not on file   Occupational History    Not on file   Tobacco Use    Smoking status: Former     Current packs/day: 0.00     Average packs/day: 1 pack/day for 18.0 years (18.0 ttl pk-yrs)     Types: Cigarettes     Start date: 10/1/1986     Quit date: 10/1/2004     Years since quittin.7    Smokeless tobacco: Former     Types: Chew     Quit date: 1985   Vaping Use    Vaping status: Not on file   Substance and Sexual Activity    Alcohol use: Not Currently     Alcohol/week: 4.0 standard drinks of alcohol     Types: 2 Cans of beer, 2 Standard drinks or equivalent per week     Comment: 3-4drinks/wk    Drug use: Never    Sexual activity: Yes     Partners: Female   Other Topics Concern     Service Not Asked    Blood Transfusions Not Asked    Caffeine Concern Yes     Comment: 3x/day    Occupational Exposure Not Asked    Hobby Hazards Not Asked    Sleep Concern Not Asked    Stress Concern Not Asked    Weight Concern Not Asked    Special Diet Not Asked    Back Care Not Asked    Exercise Yes     Comment: walk, bike, run weights    Bike Helmet Not Asked    Seat Belt Not Asked    Self-Exams Not Asked   Social History Narrative    Not on file     Social Determinants of Health     Financial Resource Strain: Low Risk  (3/8/2024)    Financial Resource Strain     Difficulty of Paying Living Expenses: Not very hard     Med Affordability: Not on file   Food Insecurity: No Food Insecurity (2024)    Received from [facility]    Hunger Vital Sign     Worried About Running Out of Food in the Last Year: Never true     Ran Out of Food in the Last Year: Never true   Transportation  Needs: No Transportation Needs (4/9/2024)    Received from [facility]    PRAPARE - Transportation     Lack of Transportation (Medical): No     Lack of Transportation (Non-Medical): No   Physical Activity: Not on file   Stress: Not on file   Social Connections: Not on file   Housing Stability: Low Risk  (4/9/2024)    Received from [facility]    Housing Stability Vital Sign     Unable to Pay for Housing in the Last Year: No     Number of Places Lived in the Last Year: 1     Unstable Housing in the Last Year: No       Medications:    Current Outpatient Medications:     triamcinolone 0.1 % External Ointment, Apply topically., Disp: , Rfl:     clobetasol 0.05 % External Ointment, Apply 1 Application topically 2 (two) times daily. Apply to palms and soles, Disp: 60 g, Rfl: 1    famotidine 40 MG Oral Tab, Take 1 tablet (40 mg total) by mouth 2 (two) times daily as needed for Heartburn., Disp: 180 tablet, Rfl: 3    Urea 20 % External Cream, Apply to hands and feet twice a day, Disp: 480 g, Rfl: 0    Zinc 100 MG Oral Tab, , Disp: , Rfl:     TURMERIC CURCUMIN OR, , Disp: , Rfl:     Cholecalciferol (VITAMIN D) 25 MCG (1000 UT) Oral Tab, , Disp: , Rfl:     Lactobacillus (PROBIOTIC ACIDOPHILUS) Oral Cap, Take by mouth., Disp: , Rfl:     Multiple Vitamins-Minerals (MULTIVITAMIN OR), Take 1 tablet by mouth daily. , Disp: , Rfl:     Omega-3 Fatty Acids (FISH OIL) 1200 MG Oral Cap, Take 1 capsule by mouth daily., Disp: , Rfl:     Loratadine 10 MG Oral Cap, Take 1 capsule by mouth daily as needed., Disp: , Rfl:     ondansetron 8 MG Oral Tablet Dispersible, Take 1 tablet (8 mg total) by mouth every 8 (eight) hours as needed for Nausea. (Patient not taking: Reported on 7/10/2024), Disp: 30 tablet, Rfl: 0    prochlorperazine (COMPAZINE) 10 mg tablet, Take 1 tablet (10 mg total) by mouth every 6 (six) hours as needed for Nausea. (Patient not taking: Reported on 6/11/2024), Disp: 30 tablet, Rfl: 3     capecitabine 500 MG Oral tab, Take 4 tablets (2,000 mg total) by mouth 2 (two) times daily for 14 days You will take this for 14 days every 21 days (Patient not taking: Reported on 7/10/2024), Disp: 112 tablet, Rfl: 8    Review of Systems:  A comprehensive 14 point review of systems was completed.  Pertinent positives and negatives noted in the HPI.    Performance Status: ECOG 0 - Fully active, able to carry on all predisease activities without restrictions.     Physical Examination:  Vital Signs: Height: 167.6 cm (5' 5.98\") (07/10 0826)  Weight: 69.4 kg (153 lb) (07/10 0826)  BSA (Calculated - sq m): 1.78 sq meters (07/10 0826)  Pulse: 76 (07/10 0826)  BP: 111/76 (07/10 0826)  Temp: 97.2 °F (36.2 °C) (07/10 0826)  Do Not Use - Resp Rate: --  SpO2: 98 % (07/10 0826)    General: Patient is alert and oriented x 3, not in acute distress.  HEENT: Anicteric, conjunctivae and sclerae clear, no oropharyngeal lesion/thrush, mucous membranes are moist   Chest: Clear to auscultation. Respirations unlabored.   Heart: Regular rate and rhythm.   Abdomen: Soft, non-distended, non-tender with present bowel sounds.  Extremities: No edema.  Neurological: Grossly intact.   Skin: warm, dry, no erythema or rash   Psych/Depression: mood and affect are appropriate.     Labs:     Recent Results (from the past 72 hour(s))   COMP METABOLIC PANEL [E]    Collection Time: 07/10/24  8:22 AM   Result Value Ref Range    Glucose 121 (H) 70 - 99 mg/dL    Sodium 136 136 - 145 mmol/L    Potassium 3.7 3.5 - 5.1 mmol/L    Chloride 107 98 - 112 mmol/L    CO2 25.0 21.0 - 32.0 mmol/L    Anion Gap 4 0 - 18 mmol/L    BUN 21 9 - 23 mg/dL    Creatinine 1.12 0.70 - 1.30 mg/dL    Calcium, Total 9.2 8.5 - 10.1 mg/dL    Calculated Osmolality 286 275 - 295 mOsm/kg    eGFR-Cr 77 >=60 mL/min/1.73m2    AST 29 15 - 37 U/L    ALT 59 16 - 61 U/L    Alkaline Phosphatase 121 (H) 45 - 117 U/L    Bilirubin, Total 0.6 0.1 - 2.0 mg/dL    Total Protein 7.1 6.4 - 8.2 g/dL     Albumin 3.6 3.4 - 5.0 g/dL    Globulin  3.5 2.8 - 4.4 g/dL    A/G Ratio 1.0 1.0 - 2.0    Patient Fasting for CMP? Patient not present    CBC W/ DIFFERENTIAL    Collection Time: 07/10/24  8:22 AM   Result Value Ref Range    WBC 11.6 (H) 4.0 - 11.0 x10(3) uL    RBC 4.92 4.30 - 5.70 x10(6)uL    HGB 14.4 13.0 - 17.5 g/dL    HCT 43.8 39.0 - 53.0 %    .0 150.0 - 450.0 10(3)uL    MCV 89.0 80.0 - 100.0 fL    MCH 29.3 26.0 - 34.0 pg    MCHC 32.9 31.0 - 37.0 g/dL    RDW 16.0 %    Neutrophil Absolute Prelim 7.91 (H) 1.50 - 7.70 x10 (3) uL    Neutrophil Absolute 7.91 (H) 1.50 - 7.70 x10(3) uL    Lymphocyte Absolute 2.82 1.00 - 4.00 x10(3) uL    Monocyte Absolute 0.75 0.10 - 1.00 x10(3) uL    Eosinophil Absolute 0.07 0.00 - 0.70 x10(3) uL    Basophil Absolute 0.04 0.00 - 0.20 x10(3) uL    Immature Granulocyte Absolute 0.04 0.00 - 1.00 x10(3) uL    Neutrophil % 68.2 %    Lymphocyte % 24.2 %    Monocyte % 6.4 %    Eosinophil % 0.6 %    Basophil % 0.3 %    Immature Granulocyte % 0.3 %       Impression/Plan    Distal CBD cholangiocarcinoma  S/p Whipple with Dr. Corral 4/9/24, due for repeat scans in July.  Currently receiving capecitabine, s/p C2. C3 delayed due to HFS.   Labs reviewed with him.     Hand foot syndrome:  Worsening after C2. Dose reduced and then held for the last two weeks  Continue steroid creams and urea cream as needed  Resolved at this point  Restart capecitabine at 1000mg BID D1-4 Q21 day.     Will see genetics today      Planned Follow Up: 3 weeks    Risk Level: HIGH, cholangiocarcinoma, receiving chemotherapy requiring close monitoring     The 21st Century Cures Act makes medical notes like these available to patients in the interest of transparency. Please be advised this is a medical document. Medical documents are intended to carry relevant information, facts as evident, and the clinical opinion of the practitioner. The medical note is intended as peer to peer communication and may appear blunt or  direct. It is written in medical language and may contain abbreviations or verbiage that are unfamiliar.     Electronically Signed by:    DOMINGO SumnerP-BC  Nurse Practitioner  [facility]

## 2024-07-15 RX ORDER — ONDANSETRON 8 MG/1
8 TABLET, ORALLY DISINTEGRATING ORAL EVERY 8 HOURS PRN
Qty: 30 TABLET | Refills: 0 | Status: SHIPPED | OUTPATIENT
Start: 2024-07-15 | End: 2024-08-13

## 2024-07-16 ENCOUNTER — GENETICS ENCOUNTER (OUTPATIENT)
Dept: HEMATOLOGY/ONCOLOGY | Facility: HOSPITAL | Age: 54
End: 2024-07-16

## 2024-07-16 NOTE — PROGRESS NOTES
Patient Name: Mateo Byrd  YOB: 1970    Referring Provider:  Anuj Reyez MD; Dale Carranza MD      Reason for Referral:  Mr. Byrd had genetic testing performed on 7/10/2024 because of his diagnosis of cholangiocarcinoma at age 53y and a family history of cancer.      Genetic Testing Result:  Negative. No pathogenic variant was found in the following 70 genes on the [employer] Koch syndrome panel and multi-cancer + RNA panel: AIP, ALK, APC, ALEXANDER, AXIN2, BAP1, BARD1, BLM, BMPR1A, BRCA1, BRCA2, BRIP1, CDC73, CDH1, CDK4, CDKN1B, CDKN2A, CHEK2, CTNNA1, DICER1, EGFR, EPCAM, FH, FLCN, GREM1, HOXB13, KIT, LZTR1, MAX, MBD4, MEN1, MET, MITF, MLH1, MSH2, MSH3, MSH6, MUTYH, NF1, NF2, NTHL1, PALB2, PDGFRA, PMS2, POLD1, POLE, POT1, JSKSU4U, PTCH1, PTEN, RAD51C, RAD51D, RB1, RET, SDHA, SDHAF2, SDHB, SDHC, SDHD, SMAD4, SMARCA4, SMARCB1, SMARCE1, STK11, SUFU, VZDK772, TP53, TSC1, TSC2, VHL. Please refer to the report from [facility] for additional testing information. These results were discussed with Mr. Byrd via telephone on 7/16/2024.    Summary and Plan:   These results indicate that Mr. Byrd was not found to have a pathogenic variant (harmful genetic mutation) in any of the genes listed above. No pathogenic variants associated with hereditary cancer syndromes were identified. The etiology Mr. Byrd's personal and family history of cancer remains genetically unexplained. The limitations of the testing were discussed with Mr. Byrd including the chance that a pathogenic variant in a gene other than those included in this analysis might be the cause of cancer in Mr. Byrd or in relatives.     Medical management and surveillance for Mr. Byrd and other family members should be based on their personal and family history. All medical management decisions should be made with a physician.     I encouraged Mr. Byrd to share the genetic test results with his children and other  relatives so that they may discuss the implications of this information with their health providers. Mr. Byrd is also encouraged to contact me on an annual basis to learn if there have been any updates in genetic information that would apply or if there are changes in the personal and/or family history. Please do not hesitate to contact my office if you have any questions or concerns, [phone].     Trupti Pang MS, CGC

## 2024-07-30 NOTE — PROGRESS NOTES
[facility] Note    Diagnosis:   Distal CBD Cholangiocardinoma, pT3 pN0 cM0 MMR intact     Treatment History:   Whipple with  Dr. Corral on 4/9/24    Visit Diagnosis:  1. Cholangiocarcinoma (HCC)          History of Present Illness: 53M referred by Dr. Reyez to discuss pancreatic adenocarcinoma    -02/2024: Noted to have recurrent stomach pain, RUQ, radiating to back. US showed dilated CBD. CT with distended GB and CBD dilation. He was admitted for biliary pancreatitis. Follow up MRCP confirmed CBD dilation but no mass.    -ERCP/EUS in 03/2024: small pancreatic head mass, 1.8 cm. Stent placed. Path with adenocarcinoma.  23.9.     -CT Pancreas protocol at [facility] without vessel involvement     -FH of cancer: Mom with breast and uterine in 60-70s, father with gastric or pancreatic    -Surgery with Dr. 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 LVI/PNI present. Negative margins, 0/17 LN. LOULOU. Of note, omentum negative. pT3 pN0. Case reviewed at [place] [facility]-recommend adjuvant Xeloda.     -Adjuvant Capecitabine   -C1: 5/21/24-only did 12 days since partial fill from pharmacy. 2000 mg BID D1-14 q21 days    -C2: 6/11/24-->DR to 1500 mg BID on 6/19/24   -C3: 7/10/24 --> DR to 1000 mg BID   -C4: 7/31/24    Interval History  -HFS is better tolerated with lower xeloda dose   -Genetics negative   -Has a CT and follow up with Dr. Dutton next week  -Energy is somewhat low but tolerable. Mild GI complaints but doing ok overall    Review of Systems: 12 Point ROS was completed and pertinent positives are in the HPI    Current Outpatient Medications on File Prior to Visit   Medication Sig Dispense Refill    ONDANSETRON 8 MG Oral Tablet Dispersible DISSOLVE 1 TABLET IN MOUTH EVERY 8 HOURS AS NEEDED FOR NAUSEA 30 tablet 0    triamcinolone 0.1 % External Ointment Apply topically.       clobetasol 0.05 % External Ointment Apply 1 Application topically 2 (two) times daily. Apply to palms and soles 60 g 1    famotidine 40 MG Oral Tab Take 1 tablet (40 mg total) by mouth 2 (two) times daily as needed for Heartburn. 180 tablet 3    Urea 20 % External Cream Apply to hands and feet twice a day 480 g 0    Zinc 100 MG Oral Tab       TURMERIC CURCUMIN OR       Cholecalciferol (VITAMIN D) 25 MCG (1000 UT) Oral Tab       Lactobacillus (PROBIOTIC ACIDOPHILUS) Oral Cap Take by mouth.      Multiple Vitamins-Minerals (MULTIVITAMIN OR) Take 1 tablet by mouth daily.         Omega-3 Fatty Acids (FISH OIL) 1200 MG Oral Cap Take 1 capsule by mouth daily.      prochlorperazine (COMPAZINE) 10 mg tablet Take 1 tablet (10 mg total) by mouth every 6 (six) hours as needed for Nausea. (Patient not taking: Reported on 6/11/2024) 30 tablet 3    capecitabine 500 MG Oral tab Take 4 tablets (2,000 mg total) by mouth 2 (two) times daily for 14 days You will take this for 14 days every 21 days (Patient not taking: Reported on 7/10/2024) 112 tablet 8    Loratadine 10 MG Oral Cap Take 1 capsule by mouth daily as needed. (Patient not taking: Reported on 7/31/2024)       No current facility-administered medications on file prior to visit.     Past Medical History:    Abdominal pain    Anxiety    Belching    Bloating    Disorder of prostate    Slightly enlarged    Flatulence/gas pain/belching    Food intolerance    Headache disorder    Hemorrhoids    Stented coronary artery    Stress    Wears glasses     Past Surgical History:   Procedure Laterality Date    Colonoscopy  January 2022    Ercp,diagnostic  03/06/24    Other surgical history  01/01/1982    left knee/\"removed a band\"     Social History     Socioeconomic History    Marital status:    Tobacco Use    Smoking status: Former     Current packs/day: 0.00     Average packs/day: 1 pack/day for 18.0 years (18.0 ttl pk-yrs)     Types: Cigarettes     Start date: 10/1/1986      Quit date: 10/1/2004     Years since quittin.8    Smokeless tobacco: Former     Types: Chew     Quit date: 1985   Substance and Sexual Activity    Alcohol use: Not Currently     Alcohol/week: 4.0 standard drinks of alcohol     Types: 2 Cans of beer, 2 Standard drinks or equivalent per week     Comment: 3-4drinks/wk    Drug use: Never    Sexual activity: Yes     Partners: Female      Family History   Problem Relation Age of Onset    Diabetes Mother         prediabetes    Hypertension Mother     Lipids Mother     Breast Cancer Mother 65 - 69    Uterine Cancer Mother 60 - 65    Colon Polyps Mother     Cancer Father 55        abdominal: pancreas vs stomach,  at 56y    Hypertension Father     Hypertension Brother     Lipids Brother     Hypertension Brother     Heart Attack Maternal Grandmother 40 - 49    Hypertension Maternal Grandmother     Cancer Maternal Grandfather 77        bone cancer    Other (Other[other]) Paternal Grandmother         emphysema, smoker    Alcohol and Other Disorders Associated Paternal Grandfather     Other (Other[other]) Daughter         cerebral palsy    Prostate Cancer Paternal Uncle 70 - 75       Physical Exam  Height: 167.6 cm (5' 5.98\") (852)  Weight: 70.3 kg (155 lb) (852)  BSA (Calculated - sq m): 1.79 sq meters (852)  Pulse: 59 (852)  BP: 129/77 (852)  Temp: 97.3 °F (36.3 °C) (852)  Do Not Use - Resp Rate: --  SpO2: 97 % (852)    General: NAD, AOX3  HEENT: clear op, mmm, no jvd, no scleral icterus  CV: RR  Extremities: No edema. Mild erythema in hands   Lungs: =no increased work of breathing  Abd: non distended   Neuro: CN: II-XII grossly intact      Results:  Lab Results   Component Value Date    WBC 11.6 (H) 07/10/2024    HGB 14.4 07/10/2024    HCT 43.8 07/10/2024    MCV 89.0 07/10/2024    .0 07/10/2024     Lab Results   Component Value Date     07/10/2024    K 3.7 07/10/2024    CO2 25.0 07/10/2024      07/10/2024    BUN 21 07/10/2024    GLUCOSE 89 08/02/2007    ALB 3.6 07/10/2024       No results found for: \"LDH\"    Radiology: reviewed     Pathology: reviewed   Final Diagnosis    A. Omentum, omentectomy:  - Adipose tissues of the omentum, negative for metastasis.  - Incidental lymph node, negative for metastasis (0\1).     B. Gallbladder, cholecystectomy:  - Chronic cholecystitis.     C. Common bile duct margin:  - Negative for malignancy.     D. Pancreas margin:  - Negative for malignancy.     E. Retroperitoneal (uncinate) pancreas margin:  - Negative for malignancy.     F. Pancreas and duodenum, Whipple procedure:  - Moderately differentiated cholangiocarcinoma, 3.4 cm, involving the pancreas and duodenal muscularis propria , see summary form.  - LVSI and perineural invasion present.  - Proximal and distal small bowel margins of resection are negative.  - 17 lymph nodes are negative for metastasis (0\17).       Assessment and Plan:  Distal CBD Cholangiocardinoma, pT3 pN0 cM0 MMR intact   -Normal . Genetics unremarkable   -S/P Whipple with Dr. Corral on 4/9/24  -We discussed adjuvant treatment with Xeloda for 6 months. Started 5/21/24.   -Given worsening HFS, Xeloda was DR to 1000 BID D1-14 q21 days  -Repeat imaging q3 months this year. Scheduled.   -Urea cream BID.     [facility]

## 2024-07-31 ENCOUNTER — APPOINTMENT (OUTPATIENT)
Dept: HEMATOLOGY/ONCOLOGY | Facility: HOSPITAL | Age: 54
End: 2024-07-31
Attending: INTERNAL MEDICINE
Payer: COMMERCIAL

## 2024-07-31 VITALS
TEMPERATURE: 97 F | OXYGEN SATURATION: 97 % | HEIGHT: 65.98 IN | SYSTOLIC BLOOD PRESSURE: 129 MMHG | BODY MASS INDEX: 24.91 KG/M2 | RESPIRATION RATE: 16 BRPM | DIASTOLIC BLOOD PRESSURE: 77 MMHG | WEIGHT: 155 LBS | HEART RATE: 59 BPM

## 2024-07-31 DIAGNOSIS — C22.1 CHOLANGIOCARCINOMA (HCC): Primary | ICD-10-CM

## 2024-07-31 LAB
ALBUMIN SERPL-MCNC: 4.4 G/DL (ref 3.2–4.8)
ALBUMIN/GLOB SERPL: 1.6 {RATIO} (ref 1–2)
ALP LIVER SERPL-CCNC: 113 U/L
ALT SERPL-CCNC: 33 U/L
ANION GAP SERPL CALC-SCNC: 6 MMOL/L (ref 0–18)
AST SERPL-CCNC: 27 U/L (ref ?–34)
BASOPHILS # BLD AUTO: 0.04 X10(3) UL (ref 0–0.2)
BASOPHILS NFR BLD AUTO: 0.5 %
BILIRUB SERPL-MCNC: 0.7 MG/DL (ref 0.3–1.2)
BUN BLD-MCNC: 15 MG/DL (ref 9–23)
CALCIUM BLD-MCNC: 10 MG/DL (ref 8.7–10.4)
CHLORIDE SERPL-SCNC: 105 MMOL/L (ref 98–112)
CO2 SERPL-SCNC: 27 MMOL/L (ref 21–32)
CREAT BLD-MCNC: 1.08 MG/DL
EGFRCR SERPLBLD CKD-EPI 2021: 81 ML/MIN/1.73M2 (ref 60–?)
EOSINOPHIL # BLD AUTO: 0.2 X10(3) UL (ref 0–0.7)
EOSINOPHIL NFR BLD AUTO: 2.5 %
ERYTHROCYTE [DISTWIDTH] IN BLOOD BY AUTOMATED COUNT: 16.3 %
GLOBULIN PLAS-MCNC: 2.7 G/DL (ref 2–3.5)
GLUCOSE BLD-MCNC: 95 MG/DL (ref 70–99)
HCT VFR BLD AUTO: 42.9 %
HGB BLD-MCNC: 14.5 G/DL
IMM GRANULOCYTES # BLD AUTO: 0.02 X10(3) UL (ref 0–1)
IMM GRANULOCYTES NFR BLD: 0.3 %
LYMPHOCYTES # BLD AUTO: 1.58 X10(3) UL (ref 1–4)
LYMPHOCYTES NFR BLD AUTO: 20.1 %
MCH RBC QN AUTO: 30.4 PG (ref 26–34)
MCHC RBC AUTO-ENTMCNC: 33.8 G/DL (ref 31–37)
MCV RBC AUTO: 89.9 FL
MONOCYTES # BLD AUTO: 0.86 X10(3) UL (ref 0.1–1)
MONOCYTES NFR BLD AUTO: 10.9 %
NEUTROPHILS # BLD AUTO: 5.18 X10 (3) UL (ref 1.5–7.7)
NEUTROPHILS # BLD AUTO: 5.18 X10(3) UL (ref 1.5–7.7)
NEUTROPHILS NFR BLD AUTO: 65.7 %
OSMOLALITY SERPL CALC.SUM OF ELEC: 287 MOSM/KG (ref 275–295)
PLATELET # BLD AUTO: 243 10(3)UL (ref 150–450)
POTASSIUM SERPL-SCNC: 4 MMOL/L (ref 3.5–5.1)
PROT SERPL-MCNC: 7.1 G/DL (ref 5.7–8.2)
RBC # BLD AUTO: 4.77 X10(6)UL
SODIUM SERPL-SCNC: 138 MMOL/L (ref 136–145)
WBC # BLD AUTO: 7.9 X10(3) UL (ref 4–11)

## 2024-07-31 PROCEDURE — 99215 OFFICE O/P EST HI 40 MIN: CPT | Performed by: INTERNAL MEDICINE

## 2024-07-31 NOTE — PROGRESS NOTES
Patient here for follow-up. Will restart Xeloda 1000 mg BID tomorrow. Hand redness and peeling improving with topicals. Still has a lingering cough from previous URI. Denies any fevers. States he is feeling better.

## 2024-08-09 ENCOUNTER — TELEPHONE (OUTPATIENT)
Dept: HEMATOLOGY/ONCOLOGY | Facility: HOSPITAL | Age: 54
End: 2024-08-09

## 2024-08-13 RX ORDER — ONDANSETRON 8 MG/1
8 TABLET, ORALLY DISINTEGRATING ORAL EVERY 8 HOURS PRN
Qty: 30 TABLET | Refills: 0 | Status: SHIPPED | OUTPATIENT
Start: 2024-08-13 | End: 2024-09-11

## 2024-08-20 NOTE — PROGRESS NOTES
[facility]    Diagnosis:   Distal CBD Cholangiocardinoma, pT3 pN0 cM0 MMR intact     Treatment History:   Whipple with  Dr. Corral on 4/9/24  Adjuvant Xeloda     Visit Diagnosis:  1. Cholangiocarcinoma (HCC)        History of Present Illness: 53M referred by Dr. Reyez to discuss pancreatic adenocarcinoma    -02/2024: Noted to have recurrent stomach pain, RUQ, radiating to back. US showed dilated CBD. CT with distended GB and CBD dilation. He was admitted for biliary pancreatitis. Follow up MRCP confirmed CBD dilation but no mass.    -ERCP/EUS in 03/2024: small pancreatic head mass, 1.8 cm. Stent placed. Path with adenocarcinoma.  23.9.     -CT Pancreas protocol at [facility] without vessel involvement     -FH of cancer: Mom with breast and uterine in 60-70s, father with gastric or pancreatic    -Surgery with Dr. 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 LVI/PNI present. Negative margins, 0/17 LN. LOULOU. Of note, omentum negative. pT3 pN0. Case reviewed at [place] [facility]-recommend adjuvant Xeloda.     -Adjuvant Capecitabine   -C1: 5/21/24-only did 12 days since partial fill from pharmacy. 2000 mg BID D1-14 q21 days    -C2: 6/11/24-->DR to 1500 mg BID on 6/19/24   -C3: 7/10/24 --> DR to 1000 mg BID   -C4: 7/31/24   -C5: 8/21/24    Interval History  -HFS is better tolerated with lower xeloda dose   -CT done at [facility]. He met with Dr. Corral and no evidence of disease.  There were some postoperative findings  -He is tolerating Xeloda with -foot syndrome.  He is using his urea cream.    Review of Systems: 12 Point ROS was completed and pertinent positives are in the HPI    Current Outpatient Medications on File Prior to Visit   Medication Sig Dispense Refill    ONDANSETRON 8 MG Oral Tablet Dispersible DISSOLVE 1 TABLET IN MOUTH EVERY 8  HOURS AS NEEDED FOR NAUSEA 30 tablet 0    triamcinolone 0.1 % External Ointment Apply topically.      clobetasol 0.05 % External Ointment Apply 1 Application topically 2 (two) times daily. Apply to palms and soles 60 g 1    famotidine 40 MG Oral Tab Take 1 tablet (40 mg total) by mouth 2 (two) times daily as needed for Heartburn. 180 tablet 3    prochlorperazine (COMPAZINE) 10 mg tablet Take 1 tablet (10 mg total) by mouth every 6 (six) hours as needed for Nausea. (Patient not taking: Reported on 6/11/2024) 30 tablet 3    capecitabine 500 MG Oral tab Take 4 tablets (2,000 mg total) by mouth 2 (two) times daily for 14 days You will take this for 14 days every 21 days (Patient not taking: Reported on 7/10/2024) 112 tablet 8    Urea 20 % External Cream Apply to hands and feet twice a day 480 g 0    Zinc 100 MG Oral Tab       TURMERIC CURCUMIN OR       Cholecalciferol (VITAMIN D) 25 MCG (1000 UT) Oral Tab       Lactobacillus (PROBIOTIC ACIDOPHILUS) Oral Cap Take by mouth.      Multiple Vitamins-Minerals (MULTIVITAMIN OR) Take 1 tablet by mouth daily.         Omega-3 Fatty Acids (FISH OIL) 1200 MG Oral Cap Take 1 capsule by mouth daily.      Loratadine 10 MG Oral Cap Take 1 capsule by mouth daily as needed. (Patient not taking: Reported on 7/31/2024)       No current facility-administered medications on file prior to visit.     Past Medical History:    Abdominal pain    Anxiety    Belching    Bloating    Disorder of prostate    Slightly enlarged    Flatulence/gas pain/belching    Food intolerance    Headache disorder    Hemorrhoids    Stented coronary artery    Stress    Wears glasses     Past Surgical History:   Procedure Laterality Date    Colonoscopy  January 2022    Ercp,diagnostic  03/06/24    Other surgical history  01/01/1982    left knee/\"removed a band\"     Social History     Socioeconomic History    Marital status:    Tobacco Use    Smoking status: Former     Current packs/day: 0.00     Average  packs/day: 1 pack/day for 18.0 years (18.0 ttl pk-yrs)     Types: Cigarettes     Start date: 10/1/1986     Quit date: 10/1/2004     Years since quittin.9    Smokeless tobacco: Former     Types: Chew     Quit date: 1985   Substance and Sexual Activity    Alcohol use: Not Currently     Alcohol/week: 4.0 standard drinks of alcohol     Types: 2 Cans of beer, 2 Standard drinks or equivalent per week     Comment: 3-4drinks/wk    Drug use: Never    Sexual activity: Yes     Partners: Female      Family History   Problem Relation Age of Onset    Diabetes Mother         prediabetes    Hypertension Mother     Lipids Mother     Breast Cancer Mother 65 - 69    Uterine Cancer Mother 60 - 65    Colon Polyps Mother     Cancer Father 55        abdominal: pancreas vs stomach,  at 56y    Hypertension Father     Hypertension Brother     Lipids Brother     Hypertension Brother     Heart Attack Maternal Grandmother 40 - 49    Hypertension Maternal Grandmother     Cancer Maternal Grandfather 77        bone cancer    Other (Other[other]) Paternal Grandmother         emphysema, smoker    Alcohol and Other Disorders Associated Paternal Grandfather     Other (Other[other]) Daughter         cerebral palsy    Prostate Cancer Paternal Uncle 70 - 75       Physical Exam  Height: 167.6 cm (5' 5.98\") (944)  Weight: 71.8 kg (158 lb 3.2 oz) (944)  BSA (Calculated - sq m): 1.81 sq meters (944)  Pulse: 65 (944)  BP: 118/76 (944)  Temp: 97.8 °F (36.6 °C) (944)  Do Not Use - Resp Rate: --  SpO2: 96 % (944)    General: NAD, AOX3  HEENT: clear op, mmm, no jvd, no scleral icterus  CV: RR  Extremities: No edema. Mild erythema in hands   Lungs: =no increased work of breathing  Abd: non distended   Neuro: CN: II-XII grossly intact      Results:  Lab Results   Component Value Date    WBC 7.9 2024    HGB 14.5 2024    HCT 42.9 2024    MCV 89.9 2024    .0 2024     Lab  Results   Component Value Date     07/31/2024    K 4.0 07/31/2024    CO2 27.0 07/31/2024     07/31/2024    BUN 15 07/31/2024    GLUCOSE 89 08/02/2007    ALB 4.4 07/31/2024       No results found for: \"LDH\"    Radiology: reviewed     Pathology: reviewed   Final Diagnosis    A. Omentum, omentectomy:  - Adipose tissues of the omentum, negative for metastasis.  - Incidental lymph node, negative for metastasis (0\1).     B. Gallbladder, cholecystectomy:  - Chronic cholecystitis.     C. Common bile duct margin:  - Negative for malignancy.     D. Pancreas margin:  - Negative for malignancy.     E. Retroperitoneal (uncinate) pancreas margin:  - Negative for malignancy.     F. Pancreas and duodenum, Whipple procedure:  - Moderately differentiated cholangiocarcinoma, 3.4 cm, involving the pancreas and duodenal muscularis propria , see summary form.  - LVSI and perineural invasion present.  - Proximal and distal small bowel margins of resection are negative.  - 17 lymph nodes are negative for metastasis (0\17).       Assessment and Plan:  Distal CBD Cholangiocardinoma, pT3 pN0 cM0 MMR intact   -Normal . Genetics unremarkable   -S/P Whipple with Dr. Corral on 4/9/24  -We discussed adjuvant treatment with Xeloda for 6 months. Started 5/21/24.   -Given worsening HFS, Xeloda was DR to 1000 BID D1-14 q21 days  -Repeat imaging q3 months for 2 years followed by every 6 months for 3 years.  He has imaging done at [facility] with a surgical oncologist (Dr. Corral).    -Urea cream BID.     RTC in 3 weeks     [facility]

## 2024-08-21 ENCOUNTER — OFFICE VISIT (OUTPATIENT)
Dept: HEMATOLOGY/ONCOLOGY | Facility: HOSPITAL | Age: 54
End: 2024-08-21
Attending: INTERNAL MEDICINE
Payer: COMMERCIAL

## 2024-08-21 VITALS
BODY MASS INDEX: 25.42 KG/M2 | TEMPERATURE: 98 F | WEIGHT: 158.19 LBS | RESPIRATION RATE: 18 BRPM | SYSTOLIC BLOOD PRESSURE: 118 MMHG | HEART RATE: 65 BPM | DIASTOLIC BLOOD PRESSURE: 76 MMHG | HEIGHT: 65.98 IN | OXYGEN SATURATION: 96 %

## 2024-08-21 DIAGNOSIS — C22.1 CHOLANGIOCARCINOMA (HCC): Primary | ICD-10-CM

## 2024-08-21 LAB
ALBUMIN SERPL-MCNC: 4.7 G/DL (ref 3.2–4.8)
ALBUMIN/GLOB SERPL: 1.9 {RATIO} (ref 1–2)
ALP LIVER SERPL-CCNC: 117 U/L
ALT SERPL-CCNC: 23 U/L
ANION GAP SERPL CALC-SCNC: 5 MMOL/L (ref 0–18)
AST SERPL-CCNC: 23 U/L (ref ?–34)
BASOPHILS # BLD AUTO: 0.04 X10(3) UL (ref 0–0.2)
BASOPHILS NFR BLD AUTO: 0.7 %
BILIRUB SERPL-MCNC: 0.7 MG/DL (ref 0.3–1.2)
BUN BLD-MCNC: 16 MG/DL (ref 9–23)
CALCIUM BLD-MCNC: 10 MG/DL (ref 8.7–10.4)
CHLORIDE SERPL-SCNC: 106 MMOL/L (ref 98–112)
CO2 SERPL-SCNC: 24 MMOL/L (ref 21–32)
CREAT BLD-MCNC: 1.05 MG/DL
EGFRCR SERPLBLD CKD-EPI 2021: 83 ML/MIN/1.73M2 (ref 60–?)
EOSINOPHIL # BLD AUTO: 0.14 X10(3) UL (ref 0–0.7)
EOSINOPHIL NFR BLD AUTO: 2.6 %
ERYTHROCYTE [DISTWIDTH] IN BLOOD BY AUTOMATED COUNT: 16.4 %
GLOBULIN PLAS-MCNC: 2.5 G/DL (ref 2–3.5)
GLUCOSE BLD-MCNC: 117 MG/DL (ref 70–99)
HCT VFR BLD AUTO: 44.7 %
HGB BLD-MCNC: 14.9 G/DL
IMM GRANULOCYTES # BLD AUTO: 0.01 X10(3) UL (ref 0–1)
IMM GRANULOCYTES NFR BLD: 0.2 %
LYMPHOCYTES # BLD AUTO: 1.85 X10(3) UL (ref 1–4)
LYMPHOCYTES NFR BLD AUTO: 34.2 %
MCH RBC QN AUTO: 30.9 PG (ref 26–34)
MCHC RBC AUTO-ENTMCNC: 33.3 G/DL (ref 31–37)
MCV RBC AUTO: 92.7 FL
MONOCYTES # BLD AUTO: 0.51 X10(3) UL (ref 0.1–1)
MONOCYTES NFR BLD AUTO: 9.4 %
NEUTROPHILS # BLD AUTO: 2.86 X10 (3) UL (ref 1.5–7.7)
NEUTROPHILS # BLD AUTO: 2.86 X10(3) UL (ref 1.5–7.7)
NEUTROPHILS NFR BLD AUTO: 52.9 %
OSMOLALITY SERPL CALC.SUM OF ELEC: 282 MOSM/KG (ref 275–295)
PLATELET # BLD AUTO: 250 10(3)UL (ref 150–450)
POTASSIUM SERPL-SCNC: 4.1 MMOL/L (ref 3.5–5.1)
PROT SERPL-MCNC: 7.2 G/DL (ref 5.7–8.2)
RBC # BLD AUTO: 4.82 X10(6)UL
SODIUM SERPL-SCNC: 135 MMOL/L (ref 136–145)
WBC # BLD AUTO: 5.4 X10(3) UL (ref 4–11)

## 2024-08-21 PROCEDURE — 99215 OFFICE O/P EST HI 40 MIN: CPT | Performed by: INTERNAL MEDICINE

## 2024-08-21 NOTE — PROGRESS NOTES
Patient here for follow-up. Continues on Xeloda 1000 mg BID. Will start tomorrow. Hands are tender/sensitive. Feet are still peeling. Having more noticeable joint aches (more prominent to shoulders and hips). Had CT performed earlier this month.

## 2024-09-10 NOTE — PROGRESS NOTES
[facility]    Diagnosis:   Distal CBD Cholangiocardinoma, pT3 pN0 cM0 MMR intact     Treatment History:   Whipple with  Dr. Corral on 4/9/24  Adjuvant Xeloda     Visit Diagnosis:  1. Cholangiocarcinoma (HCC)        History of Present Illness: 53M referred by Dr. Reyez to discuss pancreatic adenocarcinoma    -02/2024: Noted to have recurrent stomach pain, RUQ, radiating to back. US showed dilated CBD. CT with distended GB and CBD dilation. He was admitted for biliary pancreatitis. Follow up MRCP confirmed CBD dilation but no mass.    -ERCP/EUS in 03/2024: small pancreatic head mass, 1.8 cm. Stent placed. Path with adenocarcinoma.  23.9.     -CT Pancreas protocol at [facility] without vessel involvement     -FH of cancer: Mom with breast and uterine in 60-70s, father with gastric or pancreatic    -Surgery with Dr. Croral on 4/9/24: Whipple-moderately differentiated Cholangiocarcinoma, 3.4 cm involving the pancreas and duodenal muscularis propria. LVI/PNI present. Negative margins, 0/17 LN. LOULOU. Of note, omentum negative. pT3 pN0. Case reviewed at [place] [facility]-recommend adjuvant Xeloda.     -Adjuvant Capecitabine   -C1: 5/21/24-only did 12 days since partial fill from pharmacy. 2000 mg BID D1-14 q21 days    -C2: 6/11/24-->DR to 1500 mg BID on 6/19/24   -C3: 7/10/24 --> DR to 1000 mg BID   -C4: 7/31/24   -C5: 8/21/24   -C6: 9/12/24    Interval History  -HFS is controlled  -Energy is good   -No dyspnea, cough, abdominal pain, diarrhea   -Using urea cream    Review of Systems: 12 Point ROS was completed and pertinent positives are in the HPI    Current Outpatient Medications on File Prior to Visit   Medication Sig Dispense Refill    triamcinolone 0.1 % External Ointment Apply topically.      clobetasol 0.05 % External Ointment Apply 1 Application topically 2 (two) times daily. Apply to palms and soles 60 g 1    prochlorperazine (COMPAZINE) 10 mg tablet Take 1  tablet (10 mg total) by mouth every 6 (six) hours as needed for Nausea. (Patient not taking: Reported on 2024) 30 tablet 3    [] capecitabine 500 MG Oral tab Take 4 tablets (2,000 mg total) by mouth 2 (two) times daily for 14 days You will take this for 14 days every 21 days (Patient not taking: Reported on 7/10/2024) 112 tablet 8    Urea 20 % External Cream Apply to hands and feet twice a day 480 g 0    Zinc 100 MG Oral Tab       TURMERIC CURCUMIN OR       Cholecalciferol (VITAMIN D) 25 MCG (1000 UT) Oral Tab       Lactobacillus (PROBIOTIC ACIDOPHILUS) Oral Cap Take by mouth.      Multiple Vitamins-Minerals (MULTIVITAMIN OR) Take 1 tablet by mouth daily.         Omega-3 Fatty Acids (FISH OIL) 1200 MG Oral Cap Take 1 capsule by mouth daily.      Loratadine 10 MG Oral Cap Take 1 capsule by mouth daily as needed. (Patient not taking: Reported on 2024)       No current facility-administered medications on file prior to visit.     Past Medical History:    Abdominal pain    Anxiety    Belching    Bloating    Disorder of prostate    Slightly enlarged    Flatulence/gas pain/belching    Food intolerance    Headache disorder    Hemorrhoids    Stented coronary artery    Stress    Wears glasses     Past Surgical History:   Procedure Laterality Date    Colonoscopy  2022    Ercp,diagnostic  24    Other surgical history  1982    left knee/\"removed a band\"     Social History     Socioeconomic History    Marital status:    Tobacco Use    Smoking status: Former     Current packs/day: 0.00     Average packs/day: 1 pack/day for 18.0 years (18.0 ttl pk-yrs)     Types: Cigarettes     Start date: 10/1/1986     Quit date: 10/1/2004     Years since quittin.9    Smokeless tobacco: Former     Types: Chew     Quit date: 1985   Substance and Sexual Activity    Alcohol use: Not Currently     Alcohol/week: 4.0 standard drinks of alcohol     Types: 2 Cans of beer, 2 Standard drinks or  equivalent per week     Comment: 3-4drinks/wk    Drug use: Never    Sexual activity: Yes     Partners: Female      Family History   Problem Relation Age of Onset    Diabetes Mother         prediabetes    Hypertension Mother     Lipids Mother     Breast Cancer Mother 65 - 69    Uterine Cancer Mother 60 - 65    Colon Polyps Mother     Cancer Father 55        abdominal: pancreas vs stomach,  at 56y    Hypertension Father     Hypertension Brother     Lipids Brother     Hypertension Brother     Heart Attack Maternal Grandmother 40 - 49    Hypertension Maternal Grandmother     Cancer Maternal Grandfather 77        bone cancer    Other (Other[other]) Paternal Grandmother         emphysema, smoker    Alcohol and Other Disorders Associated Paternal Grandfather     Other (Other[other]) Daughter         cerebral palsy    Prostate Cancer Paternal Uncle 70 - 75       Physical Exam  Height: 167.6 cm (5' 5.98\") (901)  Weight: 72.6 kg (160 lb) (901)  BSA (Calculated - sq m): 1.82 sq meters (901)  Pulse: 62 (901)  BP: 126/76 (901)  Temp: 97.9 °F (36.6 °C) (901)  Do Not Use - Resp Rate: --  SpO2: 97 % (901)    General: NAD, AOX3  HEENT: clear op, mmm, no jvd, no scleral icterus  CV: RR  Extremities: No edema. Mild erythema in hands   Lungs: =no increased work of breathing  Abd: non distended   Neuro: CN: II-XII grossly intact      Results:  Lab Results   Component Value Date    WBC 4.6 2024    HGB 15.0 2024    HCT 44.3 2024    MCV 92.5 2024    .0 2024     Lab Results   Component Value Date     (L) 2024    K 4.1 2024    CO2 24.0 2024     2024    BUN 16 2024    GLUCOSE 89 2007    ALB 4.7 2024       No results found for: \"LDH\"    Radiology: reviewed     Pathology: reviewed   Final Diagnosis    A. Omentum, omentectomy:  - Adipose tissues of the omentum, negative for metastasis.  - Incidental  lymph node, negative for metastasis (0\1).     B. Gallbladder, cholecystectomy:  - Chronic cholecystitis.     C. Common bile duct margin:  - Negative for malignancy.     D. Pancreas margin:  - Negative for malignancy.     E. Retroperitoneal (uncinate) pancreas margin:  - Negative for malignancy.     F. Pancreas and duodenum, Whipple procedure:  - Moderately differentiated cholangiocarcinoma, 3.4 cm, involving the pancreas and duodenal muscularis propria , see summary form.  - LVSI and perineural invasion present.  - Proximal and distal small bowel margins of resection are negative.  - 17 lymph nodes are negative for metastasis (0\17).       Assessment and Plan:  Distal CBD Cholangiocardinoma, pT3 pN0 cM0 MMR intact   -Normal . Genetics unremarkable   -S/P Whipple with Dr. Corral on 4/9/24  -We discussed adjuvant treatment with Xeloda for 6 months. Started 5/21/24.   -Given worsening HFS, Xeloda was DR to 1000 BID D1-14 q21 days  -Repeat imaging q3 months for 2 years followed by every 6 months for 3 years.  He has imaging done at [facility] with a surgical oncologist (Dr. Corral).    -Urea cream BID.     RTC in 3 weeks     [facility]

## 2024-09-11 ENCOUNTER — OFFICE VISIT (OUTPATIENT)
Dept: HEMATOLOGY/ONCOLOGY | Facility: HOSPITAL | Age: 54
End: 2024-09-11
Attending: INTERNAL MEDICINE
Payer: COMMERCIAL

## 2024-09-11 VITALS
RESPIRATION RATE: 16 BRPM | SYSTOLIC BLOOD PRESSURE: 126 MMHG | HEIGHT: 65.98 IN | WEIGHT: 160 LBS | TEMPERATURE: 98 F | BODY MASS INDEX: 25.71 KG/M2 | DIASTOLIC BLOOD PRESSURE: 76 MMHG | OXYGEN SATURATION: 97 % | HEART RATE: 62 BPM

## 2024-09-11 DIAGNOSIS — C22.1 CHOLANGIOCARCINOMA (HCC): Primary | ICD-10-CM

## 2024-09-11 LAB
ALBUMIN SERPL-MCNC: 4.6 G/DL (ref 3.2–4.8)
ALBUMIN/GLOB SERPL: 1.8 {RATIO} (ref 1–2)
ALP LIVER SERPL-CCNC: 113 U/L
ALT SERPL-CCNC: 31 U/L
ANION GAP SERPL CALC-SCNC: 4 MMOL/L (ref 0–18)
AST SERPL-CCNC: 23 U/L (ref ?–34)
BASOPHILS # BLD AUTO: 0.03 X10(3) UL (ref 0–0.2)
BASOPHILS NFR BLD AUTO: 0.7 %
BILIRUB SERPL-MCNC: 0.8 MG/DL (ref 0.3–1.2)
BUN BLD-MCNC: 20 MG/DL (ref 9–23)
CALCIUM BLD-MCNC: 10.2 MG/DL (ref 8.7–10.4)
CANCER AG19-9 SERPL-ACNC: 13 U/ML (ref ?–35)
CEA SERPL-MCNC: 2 NG/ML (ref ?–5)
CHLORIDE SERPL-SCNC: 107 MMOL/L (ref 98–112)
CO2 SERPL-SCNC: 27 MMOL/L (ref 21–32)
CREAT BLD-MCNC: 1.06 MG/DL
EGFRCR SERPLBLD CKD-EPI 2021: 82 ML/MIN/1.73M2 (ref 60–?)
EOSINOPHIL # BLD AUTO: 0.07 X10(3) UL (ref 0–0.7)
EOSINOPHIL NFR BLD AUTO: 1.5 %
ERYTHROCYTE [DISTWIDTH] IN BLOOD BY AUTOMATED COUNT: 15.5 %
GLOBULIN PLAS-MCNC: 2.6 G/DL (ref 2–3.5)
GLUCOSE BLD-MCNC: 99 MG/DL (ref 70–99)
HCT VFR BLD AUTO: 44.3 %
HGB BLD-MCNC: 15 G/DL
IMM GRANULOCYTES # BLD AUTO: 0.03 X10(3) UL (ref 0–1)
IMM GRANULOCYTES NFR BLD: 0.7 %
LYMPHOCYTES # BLD AUTO: 1.51 X10(3) UL (ref 1–4)
LYMPHOCYTES NFR BLD AUTO: 33 %
MCH RBC QN AUTO: 31.3 PG (ref 26–34)
MCHC RBC AUTO-ENTMCNC: 33.9 G/DL (ref 31–37)
MCV RBC AUTO: 92.5 FL
MONOCYTES # BLD AUTO: 0.47 X10(3) UL (ref 0.1–1)
MONOCYTES NFR BLD AUTO: 10.3 %
NEUTROPHILS # BLD AUTO: 2.47 X10 (3) UL (ref 1.5–7.7)
NEUTROPHILS # BLD AUTO: 2.47 X10(3) UL (ref 1.5–7.7)
NEUTROPHILS NFR BLD AUTO: 53.8 %
OSMOLALITY SERPL CALC.SUM OF ELEC: 289 MOSM/KG (ref 275–295)
PLATELET # BLD AUTO: 220 10(3)UL (ref 150–450)
POTASSIUM SERPL-SCNC: 4.4 MMOL/L (ref 3.5–5.1)
PROT SERPL-MCNC: 7.2 G/DL (ref 5.7–8.2)
RBC # BLD AUTO: 4.79 X10(6)UL
SODIUM SERPL-SCNC: 138 MMOL/L (ref 136–145)
WBC # BLD AUTO: 4.6 X10(3) UL (ref 4–11)

## 2024-09-11 PROCEDURE — 99214 OFFICE O/P EST MOD 30 MIN: CPT | Performed by: INTERNAL MEDICINE

## 2024-09-11 RX ORDER — FAMOTIDINE 40 MG/1
40 TABLET, FILM COATED ORAL 2 TIMES DAILY PRN
Qty: 180 TABLET | Refills: 3 | Status: SHIPPED | OUTPATIENT
Start: 2024-09-11

## 2024-09-11 RX ORDER — ONDANSETRON 8 MG/1
8 TABLET, ORALLY DISINTEGRATING ORAL EVERY 8 HOURS PRN
Qty: 30 TABLET | Refills: 0 | Status: SHIPPED | OUTPATIENT
Start: 2024-09-11

## 2024-09-11 NOTE — PROGRESS NOTES
Patient here for follow-up. Remains on Xeloda 1000 mg BID. Will start next cycle tomorrow. Energy levels are still low. Joint aches unchanged. Still has some hand and foot tenderness that resolves on off week.

## 2024-10-01 DIAGNOSIS — C22.1 CHOLANGIOCARCINOMA (HCC): ICD-10-CM

## 2024-10-01 RX ORDER — CAPECITABINE 500 MG/1
1000 TABLET, FILM COATED ORAL 2 TIMES DAILY
Qty: 120 TABLET | Refills: 1 | Status: SHIPPED | OUTPATIENT
Start: 2024-10-01 | End: 2024-11-30

## 2024-10-01 NOTE — PROGRESS NOTES
[facility]    Diagnosis:   Distal CBD Cholangiocardinoma, pT3 pN0 cM0 MMR intact     Treatment History:   Whipple with  Dr. Corral on 4/9/24  Adjuvant Xeloda     Visit Diagnosis:  1. Cholangiocarcinoma (HCC)          History of Present Illness: 53M referred by Dr. Reyez to discuss pancreatic adenocarcinoma    -02/2024: Noted to have recurrent stomach pain, RUQ, radiating to back. US showed dilated CBD. CT with distended GB and CBD dilation. He was admitted for biliary pancreatitis. Follow up MRCP confirmed CBD dilation but no mass.    -ERCP/EUS in 03/2024: small pancreatic head mass, 1.8 cm. Stent placed. Path with adenocarcinoma.  23.9.     -CT Pancreas protocol at [facility] without vessel involvement     -FH of cancer: Mom with breast and uterine in 60-70s, father with gastric or pancreatic    -Surgery with Dr. 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 LVI/PNI present. Negative margins, 0/17 LN. LOULOU. Of note, omentum negative. pT3 pN0. Case reviewed at [place] [facility]-recommend adjuvant Xeloda.     -Adjuvant Capecitabine   -C1: 5/21/24-only did 12 days since partial fill from pharmacy. 2000 mg BID D1-14 q21 days    -C2: 6/11/24-->DR to 1500 mg BID on 6/19/24   -C3: 7/10/24 --> DR to 1000 mg BID   -C4: 7/31/24   -C5: 8/21/24   -C6: 9/12/24   -C7: 10/3/24    Interval History  -HFS is controlled  -Energy is good   -No dyspnea, cough, abdominal pain, diarrhea or changes in bowel habits   -Using urea cream  -Imaging will be scheduled at [facility]     Review of Systems: 12 Point ROS was completed and pertinent positives are in the HPI    Current Outpatient Medications on File Prior to Visit   Medication Sig Dispense Refill    capecitabine 500 MG Oral tab Take 2 tablets (1,000 mg total) by mouth 2 (two) times daily You will take this for 14 days every 21 days  120 tablet 1    famotidine 40 MG Oral Tab Take 1 tablet (40 mg total) by mouth 2 (two) times daily as needed for Heartburn. 180 tablet 3    ondansetron 8 MG Oral Tablet Dispersible Take 1 tablet (8 mg total) by mouth every 8 (eight) hours as needed for Nausea. 30 tablet 0    triamcinolone 0.1 % External Ointment Apply topically.      clobetasol 0.05 % External Ointment Apply 1 Application topically 2 (two) times daily. Apply to palms and soles 60 g 1    Urea 20 % External Cream Apply to hands and feet twice a day 480 g 0    Zinc 100 MG Oral Tab       TURMERIC CURCUMIN OR       Cholecalciferol (VITAMIN D) 25 MCG (1000 UT) Oral Tab       Lactobacillus (PROBIOTIC ACIDOPHILUS) Oral Cap Take by mouth.      Multiple Vitamins-Minerals (MULTIVITAMIN OR) Take 1 tablet by mouth daily.         Omega-3 Fatty Acids (FISH OIL) 1200 MG Oral Cap Take 1 capsule by mouth daily.      prochlorperazine (COMPAZINE) 10 mg tablet Take 1 tablet (10 mg total) by mouth every 6 (six) hours as needed for Nausea. (Patient not taking: Reported on 6/11/2024) 30 tablet 3    Loratadine 10 MG Oral Cap Take 1 capsule by mouth daily as needed. (Patient not taking: Reported on 7/31/2024)       No current facility-administered medications on file prior to visit.     Past Medical History:    Abdominal pain    Anxiety    Belching    Bloating    Disorder of prostate    Slightly enlarged    Flatulence/gas pain/belching    Food intolerance    Headache disorder    Hemorrhoids    Stented coronary artery    Stress    Wears glasses     Past Surgical History:   Procedure Laterality Date    Colonoscopy  January 2022    Ercp,diagnostic  03/06/24    Other surgical history  01/01/1982    left knee/\"removed a band\"     Social History     Socioeconomic History    Marital status:    Tobacco Use    Smoking status: Former     Current packs/day: 0.00     Average packs/day: 1 pack/day for 18.0 years (18.0 ttl pk-yrs)     Types: Cigarettes     Start date: 10/1/1986      Quit date: 10/1/2004     Years since quittin.0    Smokeless tobacco: Former     Types: Chew     Quit date: 1985   Substance and Sexual Activity    Alcohol use: Not Currently     Alcohol/week: 4.0 standard drinks of alcohol     Types: 2 Cans of beer, 2 Standard drinks or equivalent per week     Comment: 3-4drinks/wk    Drug use: Never    Sexual activity: Yes     Partners: Female      Family History   Problem Relation Age of Onset    Diabetes Mother         prediabetes    Hypertension Mother     Lipids Mother     Breast Cancer Mother 65 - 69    Uterine Cancer Mother 60 - 65    Colon Polyps Mother     Cancer Father 55        abdominal: pancreas vs stomach,  at 56y    Hypertension Father     Hypertension Brother     Lipids Brother     Hypertension Brother     Heart Attack Maternal Grandmother 40 - 49    Hypertension Maternal Grandmother     Cancer Maternal Grandfather 77        bone cancer    Other (Other[other]) Paternal Grandmother         emphysema, smoker    Alcohol and Other Disorders Associated Paternal Grandfather     Other (Other[other]) Daughter         cerebral palsy    Prostate Cancer Paternal Uncle 70 - 75       Physical Exam  Height: 167.6 cm (5' 5.98\") (10/02 0917)  Weight: 72.8 kg (160 lb 6.4 oz) (10/02 0917)  BSA (Calculated - sq m): 1.82 sq meters (10/02 0917)  Pulse: 74 (10/02 0917)  BP: 128/78 (10/02 0917)  Temp: 98.4 °F (36.9 °C) (10/02 0917)  Do Not Use - Resp Rate: --  SpO2: 96 % (10/02 0917)    General: NAD, AOX3  HEENT: clear op, mmm, no jvd, no scleral icterus  CV: RR  Extremities: No edema. Mild erythema in hands   Lungs: =no increased work of breathing  Abd: non distended   Neuro: CN: II-XII grossly intact      Results:  Lab Results   Component Value Date    WBC 4.2 10/02/2024    HGB 15.4 10/02/2024    HCT 44.3 10/02/2024    MCV 91.7 10/02/2024    .0 10/02/2024     Lab Results   Component Value Date     (L) 10/02/2024    K 3.9 10/02/2024    CO2 25.0 10/02/2024    CL  105 10/02/2024    BUN 20 10/02/2024    GLUCOSE 89 08/02/2007    ALB 4.2 10/02/2024       No results found for: \"LDH\"    Radiology: reviewed     Pathology: reviewed   Final Diagnosis    A. Omentum, omentectomy:  - Adipose tissues of the omentum, negative for metastasis.  - Incidental lymph node, negative for metastasis (0\1).     B. Gallbladder, cholecystectomy:  - Chronic cholecystitis.     C. Common bile duct margin:  - Negative for malignancy.     D. Pancreas margin:  - Negative for malignancy.     E. Retroperitoneal (uncinate) pancreas margin:  - Negative for malignancy.     F. Pancreas and duodenum, Whipple procedure:  - Moderately differentiated cholangiocarcinoma, 3.4 cm, involving the pancreas and duodenal muscularis propria , see summary form.  - LVSI and perineural invasion present.  - Proximal and distal small bowel margins of resection are negative.  - 17 lymph nodes are negative for metastasis (0\17).       Assessment and Plan:  Distal CBD Cholangiocardinoma, pT3 pN0 cM0 MMR intact   -Normal . Genetics unremarkable   -S/P Whipple with Dr. Corral on 4/9/24  -We discussed adjuvant treatment with Xeloda for 6 months. Started 5/21/24.   -Given worsening HFS, Xeloda was DR to 1000 BID D1-14 q21 days  -Repeat imaging q3 months for 2 years followed by every 6 months for 3 years.  He has imaging done at [facility] with a surgical oncologist (Dr. Corral).    -Urea cream BID.     RTC in 3 weeks     [facility]

## 2024-10-02 ENCOUNTER — OFFICE VISIT (OUTPATIENT)
Dept: HEMATOLOGY/ONCOLOGY | Facility: HOSPITAL | Age: 54
End: 2024-10-02
Attending: INTERNAL MEDICINE
Payer: COMMERCIAL

## 2024-10-02 VITALS
TEMPERATURE: 98 F | HEART RATE: 74 BPM | OXYGEN SATURATION: 96 % | BODY MASS INDEX: 25.78 KG/M2 | HEIGHT: 65.98 IN | WEIGHT: 160.38 LBS | DIASTOLIC BLOOD PRESSURE: 78 MMHG | SYSTOLIC BLOOD PRESSURE: 128 MMHG

## 2024-10-02 DIAGNOSIS — C22.1 CHOLANGIOCARCINOMA (HCC): Primary | ICD-10-CM

## 2024-10-02 LAB
ALBUMIN SERPL-MCNC: 4.2 G/DL (ref 3.2–4.8)
ALBUMIN/GLOB SERPL: 1.4 {RATIO} (ref 1–2)
ALP LIVER SERPL-CCNC: 111 U/L
ALT SERPL-CCNC: 28 U/L
ANION GAP SERPL CALC-SCNC: 4 MMOL/L (ref 0–18)
AST SERPL-CCNC: 25 U/L (ref ?–34)
BASOPHILS # BLD AUTO: 0.02 X10(3) UL (ref 0–0.2)
BASOPHILS NFR BLD AUTO: 0.5 %
BILIRUB SERPL-MCNC: 0.7 MG/DL (ref 0.3–1.2)
BUN BLD-MCNC: 20 MG/DL (ref 9–23)
CALCIUM BLD-MCNC: 10.2 MG/DL (ref 8.7–10.4)
CHLORIDE SERPL-SCNC: 105 MMOL/L (ref 98–112)
CO2 SERPL-SCNC: 25 MMOL/L (ref 21–32)
CREAT BLD-MCNC: 1 MG/DL
EGFRCR SERPLBLD CKD-EPI 2021: 88 ML/MIN/1.73M2 (ref 60–?)
EOSINOPHIL # BLD AUTO: 0.14 X10(3) UL (ref 0–0.7)
EOSINOPHIL NFR BLD AUTO: 3.3 %
ERYTHROCYTE [DISTWIDTH] IN BLOOD BY AUTOMATED COUNT: 14.8 %
FASTING STATUS PATIENT QL REPORTED: NO
GLOBULIN PLAS-MCNC: 2.9 G/DL (ref 2–3.5)
GLUCOSE BLD-MCNC: 97 MG/DL (ref 70–99)
HCT VFR BLD AUTO: 44.3 %
HGB BLD-MCNC: 15.4 G/DL
IMM GRANULOCYTES # BLD AUTO: 0.02 X10(3) UL (ref 0–1)
IMM GRANULOCYTES NFR BLD: 0.5 %
LYMPHOCYTES # BLD AUTO: 1.5 X10(3) UL (ref 1–4)
LYMPHOCYTES NFR BLD AUTO: 35.5 %
MCH RBC QN AUTO: 31.9 PG (ref 26–34)
MCHC RBC AUTO-ENTMCNC: 34.8 G/DL (ref 31–37)
MCV RBC AUTO: 91.7 FL
MONOCYTES # BLD AUTO: 0.44 X10(3) UL (ref 0.1–1)
MONOCYTES NFR BLD AUTO: 10.4 %
NEUTROPHILS # BLD AUTO: 2.11 X10 (3) UL (ref 1.5–7.7)
NEUTROPHILS # BLD AUTO: 2.11 X10(3) UL (ref 1.5–7.7)
NEUTROPHILS NFR BLD AUTO: 49.8 %
OSMOLALITY SERPL CALC.SUM OF ELEC: 281 MOSM/KG (ref 275–295)
PLATELET # BLD AUTO: 208 10(3)UL (ref 150–450)
POTASSIUM SERPL-SCNC: 3.9 MMOL/L (ref 3.5–5.1)
PROT SERPL-MCNC: 7.1 G/DL (ref 5.7–8.2)
RBC # BLD AUTO: 4.83 X10(6)UL
SODIUM SERPL-SCNC: 134 MMOL/L (ref 136–145)
WBC # BLD AUTO: 4.2 X10(3) UL (ref 4–11)

## 2024-10-02 PROCEDURE — G2211 COMPLEX E/M VISIT ADD ON: HCPCS | Performed by: INTERNAL MEDICINE

## 2024-10-02 PROCEDURE — 99214 OFFICE O/P EST MOD 30 MIN: CPT | Performed by: INTERNAL MEDICINE

## 2024-10-02 NOTE — PROGRESS NOTES
Patient here for follow-up. Remains on Xeloda 1000 mg BID. Currently on off week. Will start next round tomorrow. Hand and feet peeling and redness remain unchanged, but resolves while off treatment. Nausea, joint aches, and fatigue the same.

## 2024-10-03 RX ORDER — ONDANSETRON 8 MG/1
8 TABLET, ORALLY DISINTEGRATING ORAL EVERY 8 HOURS PRN
Qty: 30 TABLET | Refills: 0 | Status: SHIPPED | OUTPATIENT
Start: 2024-10-03

## 2024-10-11 ENCOUNTER — TELEPHONE (OUTPATIENT)
Dept: HEMATOLOGY/ONCOLOGY | Facility: HOSPITAL | Age: 54
End: 2024-10-11

## 2024-10-11 NOTE — TELEPHONE ENCOUNTER
Kady,Pharmacy Tech at [facility] called regarding medication Capecitabine the quantity doesn't match the directions and she'd like to know if there was an intentional dose change     Please give her a call back [phone] call ref#[id number]

## 2024-10-16 ENCOUNTER — DOCUMENTATION ONLY (OUTPATIENT)
Dept: HEMATOLOGY/ONCOLOGY | Facility: HOSPITAL | Age: 54
End: 2024-10-16

## 2024-10-22 NOTE — PROGRESS NOTES
[facility] Note    Diagnosis:   Distal CBD Cholangiocardinoma, pT3 pN0 cM0 MMR intact     Treatment History:   Whipple with  Dr. Corral on 4/9/24  Adjuvant Xeloda     Visit Diagnosis:  1. Cholangiocarcinoma (HCC)        History of Present Illness: 53M referred by Dr. Reyez to discuss pancreatic adenocarcinoma    -02/2024: Noted to have recurrent stomach pain, RUQ, radiating to back. US showed dilated CBD. CT with distended GB and CBD dilation. He was admitted for biliary pancreatitis. Follow up MRCP confirmed CBD dilation but no mass.    -ERCP/EUS in 03/2024: small pancreatic head mass, 1.8 cm. Stent placed. Path with adenocarcinoma.  23.9.     -CT Pancreas protocol at [facility] without vessel involvement     -FH of cancer: Mom with breast and uterine in 60-70s, father with gastric or pancreatic    -Surgery with Dr. 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 LVI/PNI present. Negative margins, 0/17 LN. LOULOU. Of note, omentum negative. pT3 pN0. Case reviewed at [place] [facility]-recommend adjuvant Xeloda.     -Adjuvant Capecitabine   -C1: 5/21/24-only did 12 days since partial fill from pharmacy. 2000 mg BID D1-14 q21 days    -C2: 6/11/24-->DR to 1500 mg BID on 6/19/24   -C3: 7/10/24 --> DR to 1000 mg BID   -C4: 7/31/24   -C5: 8/21/24   -C6: 9/12/24   -C7: 10/3/24   -C8: 10/23/24    Interval History  -Doing well. Energy is normal. Mild HFS. No cough, dyspnea, abdominal pain or changes in bowel habits  -Does not having imaging scheduled at [facility] yet    Review of Systems: 12 Point ROS was completed and pertinent positives are in the HPI    Current Outpatient Medications on File Prior to Visit   Medication Sig Dispense Refill    ONDANSETRON 8 MG Oral Tablet Dispersible DISSOLVE 1 TABLET IN MOUTH EVERY 8 HOURS AS NEEDED for nausea. 30 tablet 0    capecitabine 500 MG Oral tab Take 2  tablets (1,000 mg total) by mouth 2 (two) times daily You will take this for 14 days every 21 days 120 tablet 1    famotidine 40 MG Oral Tab Take 1 tablet (40 mg total) by mouth 2 (two) times daily as needed for Heartburn. 180 tablet 3    triamcinolone 0.1 % External Ointment Apply topically.      clobetasol 0.05 % External Ointment Apply 1 Application topically 2 (two) times daily. Apply to palms and soles 60 g 1    Urea 20 % External Cream Apply to hands and feet twice a day 480 g 0    Zinc 100 MG Oral Tab       TURMERIC CURCUMIN OR       Cholecalciferol (VITAMIN D) 25 MCG (1000 UT) Oral Tab       Lactobacillus (PROBIOTIC ACIDOPHILUS) Oral Cap Take by mouth.      Multiple Vitamins-Minerals (MULTIVITAMIN OR) Take 1 tablet by mouth daily.         Omega-3 Fatty Acids (FISH OIL) 1200 MG Oral Cap Take 1 capsule by mouth daily.      prochlorperazine (COMPAZINE) 10 mg tablet Take 1 tablet (10 mg total) by mouth every 6 (six) hours as needed for Nausea. (Patient not taking: Reported on 10/23/2024) 30 tablet 3    Loratadine 10 MG Oral Cap Take 1 capsule by mouth daily as needed. (Patient not taking: Reported on 10/23/2024)       No current facility-administered medications on file prior to visit.     Past Medical History:    Abdominal pain    Anxiety    Belching    Bloating    Disorder of prostate    Slightly enlarged    Flatulence/gas pain/belching    Food intolerance    Headache disorder    Hemorrhoids    Stented coronary artery    Stress    Wears glasses     Past Surgical History:   Procedure Laterality Date    Colonoscopy  January 2022    Ercp,diagnostic  03/06/24    Other surgical history  01/01/1982    left knee/\"removed a band\"     Social History     Socioeconomic History    Marital status:    Tobacco Use    Smoking status: Former     Current packs/day: 0.00     Average packs/day: 1 pack/day for 18.0 years (18.0 ttl pk-yrs)     Types: Cigarettes     Start date: 10/1/1986     Quit date: 10/1/2004     Years  since quittin.0    Smokeless tobacco: Former     Types: Chew     Quit date: 1985   Substance and Sexual Activity    Alcohol use: Not Currently     Alcohol/week: 4.0 standard drinks of alcohol     Types: 2 Cans of beer, 2 Standard drinks or equivalent per week     Comment: 3-4drinks/wk    Drug use: Never    Sexual activity: Yes     Partners: Female      Family History   Problem Relation Age of Onset    Diabetes Mother         prediabetes    Hypertension Mother     Lipids Mother     Breast Cancer Mother 65 - 69    Uterine Cancer Mother 60 - 65    Colon Polyps Mother     Cancer Father 55        abdominal: pancreas vs stomach,  at 56y    Hypertension Father     Hypertension Brother     Lipids Brother     Hypertension Brother     Heart Attack Maternal Grandmother 40 - 49    Hypertension Maternal Grandmother     Cancer Maternal Grandfather 77        bone cancer    Other (Other[other]) Paternal Grandmother         emphysema, smoker    Alcohol and Other Disorders Associated Paternal Grandfather     Other (Other[other]) Daughter         cerebral palsy    Prostate Cancer Paternal Uncle 70 - 75       Physical Exam  Height: 167.6 cm (5' 5.98\") (10/23 0853)  Weight: 73.9 kg (163 lb) (10/23 0853)  BSA (Calculated - sq m): 1.83 sq meters (10/23 0853)  Pulse: 56 (10/23 0853)  BP: 119/76 (10/23 0853)  Temp: 97.6 °F (36.4 °C) (10/23 0853)  Do Not Use - Resp Rate: --  SpO2: 98 % (10/23 0853)    General: NAD, AOX3  HEENT: clear op, mmm, no jvd, no scleral icterus  CV: RR  Extremities: No edema. Mild erythema in hands   Lungs: =no increased work of breathing  Abd: non distended   Neuro: CN: II-XII grossly intact      Results:  Lab Results   Component Value Date    WBC 5.0 10/23/2024    HGB 15.8 10/23/2024    HCT 47.5 10/23/2024    MCV 94.4 10/23/2024    .0 10/23/2024     Lab Results   Component Value Date     (L) 10/02/2024    K 3.9 10/02/2024    CO2 25.0 10/02/2024     10/02/2024    BUN 20 10/02/2024     GLUCOSE 89 08/02/2007    ALB 4.2 10/02/2024       No results found for: \"LDH\"    Radiology: reviewed     Pathology: reviewed   Final Diagnosis    A. Omentum, omentectomy:  - Adipose tissues of the omentum, negative for metastasis.  - Incidental lymph node, negative for metastasis (0\1).     B. Gallbladder, cholecystectomy:  - Chronic cholecystitis.     C. Common bile duct margin:  - Negative for malignancy.     D. Pancreas margin:  - Negative for malignancy.     E. Retroperitoneal (uncinate) pancreas margin:  - Negative for malignancy.     F. Pancreas and duodenum, Whipple procedure:  - Moderately differentiated cholangiocarcinoma, 3.4 cm, involving the pancreas and duodenal muscularis propria , see summary form.  - LVSI and perineural invasion present.  - Proximal and distal small bowel margins of resection are negative.  - 17 lymph nodes are negative for metastasis (0\17).       Assessment and Plan:  Distal CBD Cholangiocardinoma, pT3 pN0 cM0 MMR intact   -Normal . Genetics unremarkable   -S/P Whipple with Dr. Corral on 4/9/24  -We discussed adjuvant treatment with Xeloda for 6 months. Started 5/21/24.   -Given worsening HFS, Xeloda was DR to 1000 BID D1-14 q21 days  -This will be his last cycle  -Repeat imaging q3 months for 2 years followed by every 6 months for 3 years.  He has imaging done at [facility] with a surgical oncologist (Dr. Corral).    -Urea cream BID.     RTC in 3 months     OWEN Carranza MD  [facility]

## 2024-10-23 ENCOUNTER — OFFICE VISIT (OUTPATIENT)
Dept: HEMATOLOGY/ONCOLOGY | Facility: HOSPITAL | Age: 54
End: 2024-10-23
Attending: INTERNAL MEDICINE
Payer: COMMERCIAL

## 2024-10-23 VITALS
BODY MASS INDEX: 26.2 KG/M2 | SYSTOLIC BLOOD PRESSURE: 119 MMHG | TEMPERATURE: 98 F | HEART RATE: 56 BPM | OXYGEN SATURATION: 98 % | DIASTOLIC BLOOD PRESSURE: 76 MMHG | RESPIRATION RATE: 18 BRPM | WEIGHT: 163 LBS | HEIGHT: 65.98 IN

## 2024-10-23 DIAGNOSIS — C22.1 CHOLANGIOCARCINOMA (HCC): ICD-10-CM

## 2024-10-23 LAB
ALBUMIN SERPL-MCNC: 4.7 G/DL (ref 3.2–4.8)
ALBUMIN/GLOB SERPL: 2 {RATIO} (ref 1–2)
ALP LIVER SERPL-CCNC: 102 U/L
ALT SERPL-CCNC: 26 U/L
ANION GAP SERPL CALC-SCNC: 4 MMOL/L (ref 0–18)
AST SERPL-CCNC: 26 U/L (ref ?–34)
BASOPHILS # BLD AUTO: 0.02 X10(3) UL (ref 0–0.2)
BASOPHILS NFR BLD AUTO: 0.4 %
BILIRUB SERPL-MCNC: 0.6 MG/DL (ref 0.3–1.2)
BUN BLD-MCNC: 17 MG/DL (ref 9–23)
CALCIUM BLD-MCNC: 10.2 MG/DL (ref 8.7–10.4)
CHLORIDE SERPL-SCNC: 105 MMOL/L (ref 98–112)
CO2 SERPL-SCNC: 28 MMOL/L (ref 21–32)
CREAT BLD-MCNC: 0.97 MG/DL
EGFRCR SERPLBLD CKD-EPI 2021: 92 ML/MIN/1.73M2 (ref 60–?)
EOSINOPHIL # BLD AUTO: 0.17 X10(3) UL (ref 0–0.7)
EOSINOPHIL NFR BLD AUTO: 3.4 %
ERYTHROCYTE [DISTWIDTH] IN BLOOD BY AUTOMATED COUNT: 15.1 %
GLOBULIN PLAS-MCNC: 2.4 G/DL (ref 2–3.5)
GLUCOSE BLD-MCNC: 84 MG/DL (ref 70–99)
HCT VFR BLD AUTO: 47.5 %
HGB BLD-MCNC: 15.8 G/DL
IMM GRANULOCYTES # BLD AUTO: 0.01 X10(3) UL (ref 0–1)
IMM GRANULOCYTES NFR BLD: 0.2 %
LYMPHOCYTES # BLD AUTO: 1.75 X10(3) UL (ref 1–4)
LYMPHOCYTES NFR BLD AUTO: 35.2 %
MCH RBC QN AUTO: 31.4 PG (ref 26–34)
MCHC RBC AUTO-ENTMCNC: 33.3 G/DL (ref 31–37)
MCV RBC AUTO: 94.4 FL
MONOCYTES # BLD AUTO: 0.59 X10(3) UL (ref 0.1–1)
MONOCYTES NFR BLD AUTO: 11.9 %
NEUTROPHILS # BLD AUTO: 2.43 X10 (3) UL (ref 1.5–7.7)
NEUTROPHILS # BLD AUTO: 2.43 X10(3) UL (ref 1.5–7.7)
NEUTROPHILS NFR BLD AUTO: 48.9 %
OSMOLALITY SERPL CALC.SUM OF ELEC: 285 MOSM/KG (ref 275–295)
PLATELET # BLD AUTO: 210 10(3)UL (ref 150–450)
POTASSIUM SERPL-SCNC: 4 MMOL/L (ref 3.5–5.1)
PROT SERPL-MCNC: 7.1 G/DL (ref 5.7–8.2)
RBC # BLD AUTO: 5.03 X10(6)UL
SODIUM SERPL-SCNC: 137 MMOL/L (ref 136–145)
WBC # BLD AUTO: 5 X10(3) UL (ref 4–11)

## 2024-10-23 PROCEDURE — G2211 COMPLEX E/M VISIT ADD ON: HCPCS | Performed by: INTERNAL MEDICINE

## 2024-10-23 PROCEDURE — 99214 OFFICE O/P EST MOD 30 MIN: CPT | Performed by: INTERNAL MEDICINE

## 2024-10-23 NOTE — PROGRESS NOTES
Patient here for follow-up. Continues on Xeloda 1000 mg BID. Will start next round tomorrow 10/24/24. Nausea managed. Joint aches somewhat worse. Energy levels are the same.

## 2025-02-11 NOTE — PROGRESS NOTES
[facility] Note    Diagnosis:   Distal CBD Cholangiocardinoma, pT3 pN0 cM0 MMR intact     Treatment History:   Whipple with  Dr. Corral on 4/9/24  Adjuvant Xeloda     Visit Diagnosis:  1. Cholangiocarcinoma (HCC)        History of Present Illness: 53M referred by Dr. Reyez to discuss pancreatic adenocarcinoma    -02/2024: Noted to have recurrent stomach pain, RUQ, radiating to back. US showed dilated CBD. CT with distended GB and CBD dilation. He was admitted for biliary pancreatitis. Follow up MRCP confirmed CBD dilation but no mass.    -ERCP/EUS in 03/2024: small pancreatic head mass, 1.8 cm. Stent placed. Path with adenocarcinoma.  23.9.     -CT Pancreas protocol at [facility] without vessel involvement     -FH of cancer: Mom with breast and uterine in 60-70s, father with gastric or pancreatic    -Surgery with Dr. 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 LVI/PNI present. Negative margins, 0/17 LN. LOULOU. Of note, omentum negative. pT3 pN0. Case reviewed at [place] [facility]-recommend adjuvant Xeloda.     -Adjuvant Capecitabine x 8 cycles: 5/21/24-10/23/24. He required dose reduction 1000 mg BID by C3.     -CT CAP 11/2024: [facility]  -CT CAP 2/10/25 at [facility]-stable- possible small 8 x 12 mm LN anterior hepatic artery which appears stable    Interval History  -CT reviewed   -He feels great. No new complaints. Energy is good  -No changes in bowel habits. No blood in his stool  -Met with Dr. Corral-will repeat imaging in 3 months     Review of Systems: 12 Point ROS was completed and pertinent positives are in the HPI    Current Outpatient Medications on File Prior to Visit   Medication Sig Dispense Refill    famotidine 40 MG Oral Tab Take 1 tablet (40 mg total) by mouth 2 (two) times daily as needed for Heartburn. 180 tablet 3    Zinc 100 MG Oral Tab       TURMERIC CURCUMIN OR        Lactobacillus (PROBIOTIC ACIDOPHILUS) Oral Cap Take by mouth.      Multiple Vitamins-Minerals (MULTIVITAMIN OR) Take 1 tablet by mouth daily.         Omega-3 Fatty Acids (FISH OIL) 1200 MG Oral Cap Take 1 capsule by mouth daily.      ONDANSETRON 8 MG Oral Tablet Dispersible DISSOLVE 1 TABLET IN MOUTH EVERY 8 HOURS AS NEEDED for nausea. (Patient not taking: Reported on 2/12/2025) 30 tablet 0    triamcinolone 0.1 % External Ointment Apply topically. (Patient not taking: Reported on 2/12/2025)      clobetasol 0.05 % External Ointment Apply 1 Application topically 2 (two) times daily. Apply to palms and soles (Patient not taking: Reported on 2/12/2025) 60 g 1    prochlorperazine (COMPAZINE) 10 mg tablet Take 1 tablet (10 mg total) by mouth every 6 (six) hours as needed for Nausea. (Patient not taking: Reported on 6/11/2024) 30 tablet 3    Urea 20 % External Cream Apply to hands and feet twice a day (Patient not taking: Reported on 2/12/2025) 480 g 0    Cholecalciferol (VITAMIN D) 25 MCG (1000 UT) Oral Tab  (Patient not taking: Reported on 2/12/2025)      Loratadine 10 MG Oral Cap Take 1 capsule by mouth daily as needed. (Patient not taking: Reported on 10/23/2024)       No current facility-administered medications on file prior to visit.     Past Medical History:    Abdominal pain    Anxiety    Belching    Bloating    Disorder of prostate    Slightly enlarged    Flatulence/gas pain/belching    Food intolerance    Headache disorder    Hemorrhoids    Stented coronary artery    Stress    Wears glasses     Past Surgical History:   Procedure Laterality Date    Colonoscopy  January 2022    Ercp,diagnostic  03/06/24    Other surgical history  01/01/1982    left knee/\"removed a band\"     Social History     Socioeconomic History    Marital status:    Tobacco Use    Smoking status: Former     Current packs/day: 0.00     Average packs/day: 1 pack/day for 18.0 years (18.0 ttl pk-yrs)     Types: Cigarettes     Start date:  10/1/1986     Quit date: 10/1/2004     Years since quittin.3    Smokeless tobacco: Former     Types: Chew     Quit date: 1985   Substance and Sexual Activity    Alcohol use: Not Currently     Alcohol/week: 4.0 standard drinks of alcohol     Types: 2 Cans of beer, 2 Standard drinks or equivalent per week     Comment: 3-4drinks/wk    Drug use: Never    Sexual activity: Yes     Partners: Female      Family History   Problem Relation Age of Onset    Diabetes Mother         prediabetes    Hypertension Mother     Lipids Mother     Breast Cancer Mother 65 - 69    Uterine Cancer Mother 60 - 65    Colon Polyps Mother     Cancer Father 55        abdominal: pancreas vs stomach,  at 56y    Hypertension Father     Hypertension Brother     Lipids Brother     Hypertension Brother     Heart Attack Maternal Grandmother 40 - 49    Hypertension Maternal Grandmother     Cancer Maternal Grandfather 77        bone cancer    Other (Other[other]) Paternal Grandmother         emphysema, smoker    Alcohol and Other Disorders Associated Paternal Grandfather     Other (Other[other]) Daughter         cerebral palsy    Prostate Cancer Paternal Uncle 70 - 75       Physical Exam  Height: 167.6 cm (5' 5.98\") (847)  Weight: 73.7 kg (162 lb 8 oz) (847)  BSA (Calculated - sq m): 1.83 sq meters (847)  Pulse: 59 (847)  BP: 132/81 (847)  Temp: 96.8 °F (36 °C) (847)  Do Not Use - Resp Rate: --  SpO2: 99 % (847)    General: NAD, AOX3  HEENT: clear op, mmm, no jvd, no scleral icterus  CV: RR  Extremities: No edema. Mild erythema in hands   Lungs: =no increased work of breathing  Abd: non distended   Neuro: CN: II-XII grossly intact      Results:  Lab Results   Component Value Date    WBC 5.0 10/23/2024    HGB 15.8 10/23/2024    HCT 47.5 10/23/2024    MCV 94.4 10/23/2024    .0 10/23/2024     Lab Results   Component Value Date     10/23/2024    K 4.0 10/23/2024    CO2 28.0 10/23/2024      10/23/2024    BUN 17 10/23/2024    GLUCOSE 89 08/02/2007    ALB 4.7 10/23/2024       No results found for: \"LDH\"    Radiology: reviewed     Pathology: reviewed   Final Diagnosis    A. Omentum, omentectomy:  - Adipose tissues of the omentum, negative for metastasis.  - Incidental lymph node, negative for metastasis (0\1).     B. Gallbladder, cholecystectomy:  - Chronic cholecystitis.     C. Common bile duct margin:  - Negative for malignancy.     D. Pancreas margin:  - Negative for malignancy.     E. Retroperitoneal (uncinate) pancreas margin:  - Negative for malignancy.     F. Pancreas and duodenum, Whipple procedure:  - Moderately differentiated cholangiocarcinoma, 3.4 cm, involving the pancreas and duodenal muscularis propria , see summary form.  - LVSI and perineural invasion present.  - Proximal and distal small bowel margins of resection are negative.  - 17 lymph nodes are negative for metastasis (0\17).       Assessment and Plan:  Distal CBD Cholangiocardinoma, pT3 pN0 cM0 MMR intact   -Normal . Genetics unremarkable   -S/P Whipple with Dr. Corral on 4/9/24  -Finished 6 months  Xeloda for 6 months. Started 5/21/24. Given worsening HFS, Xeloda was DR to 1000 BID D1-14 q21 days. Finished 10/2024  -Repeat imaging q3 months for 2 years followed by every 6 months for 3 years.  He has imaging done at [facility] with a surgical oncologist (Dr. Corral).    -Urea cream BID.   -Labs pending    RTC in 3 months     [facility]

## 2025-02-12 ENCOUNTER — OFFICE VISIT (OUTPATIENT)
Age: 55
End: 2025-02-12
Attending: INTERNAL MEDICINE
Payer: COMMERCIAL

## 2025-02-12 VITALS
BODY MASS INDEX: 26.12 KG/M2 | HEART RATE: 59 BPM | OXYGEN SATURATION: 99 % | DIASTOLIC BLOOD PRESSURE: 81 MMHG | HEIGHT: 65.98 IN | TEMPERATURE: 97 F | RESPIRATION RATE: 16 BRPM | WEIGHT: 162.5 LBS | SYSTOLIC BLOOD PRESSURE: 132 MMHG

## 2025-02-12 DIAGNOSIS — C22.1 CHOLANGIOCARCINOMA (HCC): ICD-10-CM

## 2025-02-12 LAB
ALBUMIN SERPL-MCNC: 4.5 G/DL (ref 3.2–4.8)
ALBUMIN/GLOB SERPL: 1.7 {RATIO} (ref 1–2)
ALP LIVER SERPL-CCNC: 110 U/L
ALT SERPL-CCNC: 44 U/L
ANION GAP SERPL CALC-SCNC: 4 MMOL/L (ref 0–18)
AST SERPL-CCNC: 32 U/L (ref ?–34)
BASOPHILS # BLD AUTO: 0.04 X10(3) UL (ref 0–0.2)
BASOPHILS NFR BLD AUTO: 0.8 %
BILIRUB SERPL-MCNC: 0.6 MG/DL (ref 0.3–1.2)
BUN BLD-MCNC: 20 MG/DL (ref 9–23)
CALCIUM BLD-MCNC: 9.7 MG/DL (ref 8.7–10.6)
CANCER AG19-9 SERPL-ACNC: 12.3 U/ML (ref ?–35)
CEA SERPL-MCNC: 2 NG/ML (ref ?–5)
CHLORIDE SERPL-SCNC: 105 MMOL/L (ref 98–112)
CO2 SERPL-SCNC: 28 MMOL/L (ref 21–32)
CREAT BLD-MCNC: 1.07 MG/DL
EGFRCR SERPLBLD CKD-EPI 2021: 81 ML/MIN/1.73M2 (ref 60–?)
EOSINOPHIL # BLD AUTO: 0.09 X10(3) UL (ref 0–0.7)
EOSINOPHIL NFR BLD AUTO: 1.7 %
ERYTHROCYTE [DISTWIDTH] IN BLOOD BY AUTOMATED COUNT: 12.4 %
FASTING STATUS PATIENT QL REPORTED: NO
GLOBULIN PLAS-MCNC: 2.6 G/DL (ref 2–3.5)
GLUCOSE BLD-MCNC: 89 MG/DL (ref 70–99)
HCT VFR BLD AUTO: 47.3 %
HGB BLD-MCNC: 15.8 G/DL
IMM GRANULOCYTES # BLD AUTO: 0.01 X10(3) UL (ref 0–1)
IMM GRANULOCYTES NFR BLD: 0.2 %
LYMPHOCYTES # BLD AUTO: 1.63 X10(3) UL (ref 1–4)
LYMPHOCYTES NFR BLD AUTO: 31.4 %
MCH RBC QN AUTO: 29.3 PG (ref 26–34)
MCHC RBC AUTO-ENTMCNC: 33.4 G/DL (ref 31–37)
MCV RBC AUTO: 87.6 FL
MONOCYTES # BLD AUTO: 0.4 X10(3) UL (ref 0.1–1)
MONOCYTES NFR BLD AUTO: 7.7 %
NEUTROPHILS # BLD AUTO: 3.02 X10 (3) UL (ref 1.5–7.7)
NEUTROPHILS # BLD AUTO: 3.02 X10(3) UL (ref 1.5–7.7)
NEUTROPHILS NFR BLD AUTO: 58.2 %
OSMOLALITY SERPL CALC.SUM OF ELEC: 286 MOSM/KG (ref 275–295)
PLATELET # BLD AUTO: 204 10(3)UL (ref 150–450)
POTASSIUM SERPL-SCNC: 4 MMOL/L (ref 3.5–5.1)
PROT SERPL-MCNC: 7.1 G/DL (ref 5.7–8.2)
RBC # BLD AUTO: 5.4 X10(6)UL
SODIUM SERPL-SCNC: 137 MMOL/L (ref 136–145)
WBC # BLD AUTO: 5.2 X10(3) UL (ref 4–11)

## 2025-02-12 NOTE — PROGRESS NOTES
Patient here for follow-up. Had his CT performed earlier this week. States he feels well overall. Denies any fatigue, pain, nausea, or other symptoms at this time.

## 2025-03-25 ENCOUNTER — OFFICE VISIT (OUTPATIENT)
Dept: FAMILY MEDICINE CLINIC | Facility: CLINIC | Age: 55
End: 2025-03-25
Payer: COMMERCIAL

## 2025-03-25 VITALS
OXYGEN SATURATION: 99 % | RESPIRATION RATE: 16 BRPM | WEIGHT: 161 LBS | HEART RATE: 78 BPM | HEIGHT: 65.98 IN | SYSTOLIC BLOOD PRESSURE: 126 MMHG | DIASTOLIC BLOOD PRESSURE: 80 MMHG | BODY MASS INDEX: 25.88 KG/M2

## 2025-03-25 DIAGNOSIS — R74.8 ELEVATED ALKALINE PHOSPHATASE LEVEL: ICD-10-CM

## 2025-03-25 DIAGNOSIS — Z23 NEED FOR VACCINATION: ICD-10-CM

## 2025-03-25 DIAGNOSIS — Z00.00 ANNUAL PHYSICAL EXAM: Primary | ICD-10-CM

## 2025-03-25 DIAGNOSIS — C25.0 CANCER OF HEAD OF PANCREAS (HCC): ICD-10-CM

## 2025-03-25 DIAGNOSIS — Z00.00 LABORATORY EXAMINATION ORDERED AS PART OF A ROUTINE GENERAL MEDICAL EXAMINATION: ICD-10-CM

## 2025-03-25 DIAGNOSIS — N52.9 ERECTILE DYSFUNCTION, UNSPECIFIED ERECTILE DYSFUNCTION TYPE: ICD-10-CM

## 2025-03-25 PROBLEM — C79.89: Status: ACTIVE | Noted: 2024-04-09

## 2025-03-25 LAB
ALBUMIN SERPL-MCNC: 4.7 G/DL (ref 3.2–4.8)
ALBUMIN/GLOB SERPL: 1.9 {RATIO} (ref 1–2)
ALP LIVER SERPL-CCNC: 130 U/L
ALT SERPL-CCNC: 48 U/L
ANION GAP SERPL CALC-SCNC: 10 MMOL/L (ref 0–18)
AST SERPL-CCNC: 31 U/L (ref ?–34)
BASOPHILS # BLD AUTO: 0.02 X10(3) UL (ref 0–0.2)
BASOPHILS NFR BLD AUTO: 0.4 %
BILIRUB SERPL-MCNC: 0.5 MG/DL (ref 0.3–1.2)
BUN BLD-MCNC: 20 MG/DL (ref 9–23)
CALCIUM BLD-MCNC: 10.4 MG/DL (ref 8.7–10.6)
CHLORIDE SERPL-SCNC: 103 MMOL/L (ref 98–112)
CHOLEST SERPL-MCNC: 162 MG/DL (ref ?–200)
CO2 SERPL-SCNC: 28 MMOL/L (ref 21–32)
CREAT BLD-MCNC: 1.11 MG/DL
EGFRCR SERPLBLD CKD-EPI 2021: 78 ML/MIN/1.73M2 (ref 60–?)
EOSINOPHIL # BLD AUTO: 0.08 X10(3) UL (ref 0–0.7)
EOSINOPHIL NFR BLD AUTO: 1.5 %
ERYTHROCYTE [DISTWIDTH] IN BLOOD BY AUTOMATED COUNT: 12.6 %
EST. AVERAGE GLUCOSE BLD GHB EST-MCNC: 126 MG/DL (ref 68–126)
FASTING PATIENT LIPID ANSWER: NO
FASTING STATUS PATIENT QL REPORTED: NO
GGT SERPL-CCNC: 126 U/L
GLOBULIN PLAS-MCNC: 2.5 G/DL (ref 2–3.5)
GLUCOSE BLD-MCNC: 105 MG/DL (ref 70–99)
HBA1C MFR BLD: 6 % (ref ?–5.7)
HCT VFR BLD AUTO: 47.6 %
HDLC SERPL-MCNC: 72 MG/DL (ref 40–59)
HGB BLD-MCNC: 15.8 G/DL
IMM GRANULOCYTES # BLD AUTO: 0.01 X10(3) UL (ref 0–1)
IMM GRANULOCYTES NFR BLD: 0.2 %
LDLC SERPL CALC-MCNC: 75 MG/DL (ref ?–100)
LYMPHOCYTES # BLD AUTO: 1.46 X10(3) UL (ref 1–4)
LYMPHOCYTES NFR BLD AUTO: 27.1 %
MCH RBC QN AUTO: 28.4 PG (ref 26–34)
MCHC RBC AUTO-ENTMCNC: 33.2 G/DL (ref 31–37)
MCV RBC AUTO: 85.5 FL
MONOCYTES # BLD AUTO: 0.42 X10(3) UL (ref 0.1–1)
MONOCYTES NFR BLD AUTO: 7.8 %
NEUTROPHILS # BLD AUTO: 3.39 X10 (3) UL (ref 1.5–7.7)
NEUTROPHILS # BLD AUTO: 3.39 X10(3) UL (ref 1.5–7.7)
NEUTROPHILS NFR BLD AUTO: 63 %
NONHDLC SERPL-MCNC: 90 MG/DL (ref ?–130)
OSMOLALITY SERPL CALC.SUM OF ELEC: 295 MOSM/KG (ref 275–295)
PLATELET # BLD AUTO: 232 10(3)UL (ref 150–450)
POTASSIUM SERPL-SCNC: 4 MMOL/L (ref 3.5–5.1)
PROT SERPL-MCNC: 7.2 G/DL (ref 5.7–8.2)
RBC # BLD AUTO: 5.57 X10(6)UL
SODIUM SERPL-SCNC: 141 MMOL/L (ref 136–145)
TRIGL SERPL-MCNC: 80 MG/DL (ref 30–149)
TSI SER-ACNC: 1.06 UIU/ML (ref 0.55–4.78)
VLDLC SERPL CALC-MCNC: 12 MG/DL (ref 0–30)
WBC # BLD AUTO: 5.4 X10(3) UL (ref 4–11)

## 2025-03-25 PROCEDURE — 3008F BODY MASS INDEX DOCD: CPT | Performed by: STUDENT IN AN ORGANIZED HEALTH CARE EDUCATION/TRAINING PROGRAM

## 2025-03-25 PROCEDURE — 82977 ASSAY OF GGT: CPT | Performed by: STUDENT IN AN ORGANIZED HEALTH CARE EDUCATION/TRAINING PROGRAM

## 2025-03-25 PROCEDURE — 80053 COMPREHEN METABOLIC PANEL: CPT | Performed by: STUDENT IN AN ORGANIZED HEALTH CARE EDUCATION/TRAINING PROGRAM

## 2025-03-25 PROCEDURE — 83036 HEMOGLOBIN GLYCOSYLATED A1C: CPT | Performed by: STUDENT IN AN ORGANIZED HEALTH CARE EDUCATION/TRAINING PROGRAM

## 2025-03-25 PROCEDURE — 99396 PREV VISIT EST AGE 40-64: CPT | Performed by: STUDENT IN AN ORGANIZED HEALTH CARE EDUCATION/TRAINING PROGRAM

## 2025-03-25 PROCEDURE — 90715 TDAP VACCINE 7 YRS/> IM: CPT | Performed by: STUDENT IN AN ORGANIZED HEALTH CARE EDUCATION/TRAINING PROGRAM

## 2025-03-25 PROCEDURE — 99212 OFFICE O/P EST SF 10 MIN: CPT | Performed by: STUDENT IN AN ORGANIZED HEALTH CARE EDUCATION/TRAINING PROGRAM

## 2025-03-25 PROCEDURE — 90471 IMMUNIZATION ADMIN: CPT | Performed by: STUDENT IN AN ORGANIZED HEALTH CARE EDUCATION/TRAINING PROGRAM

## 2025-03-25 PROCEDURE — 84443 ASSAY THYROID STIM HORMONE: CPT | Performed by: STUDENT IN AN ORGANIZED HEALTH CARE EDUCATION/TRAINING PROGRAM

## 2025-03-25 PROCEDURE — 3074F SYST BP LT 130 MM HG: CPT | Performed by: STUDENT IN AN ORGANIZED HEALTH CARE EDUCATION/TRAINING PROGRAM

## 2025-03-25 PROCEDURE — 3079F DIAST BP 80-89 MM HG: CPT | Performed by: STUDENT IN AN ORGANIZED HEALTH CARE EDUCATION/TRAINING PROGRAM

## 2025-03-25 PROCEDURE — 85025 COMPLETE CBC W/AUTO DIFF WBC: CPT | Performed by: STUDENT IN AN ORGANIZED HEALTH CARE EDUCATION/TRAINING PROGRAM

## 2025-03-25 PROCEDURE — 80061 LIPID PANEL: CPT | Performed by: STUDENT IN AN ORGANIZED HEALTH CARE EDUCATION/TRAINING PROGRAM

## 2025-03-25 RX ORDER — SILDENAFIL 50 MG/1
50 TABLET, FILM COATED ORAL
Qty: 30 TABLET | Refills: 0 | Status: SHIPPED | OUTPATIENT
Start: 2025-03-25

## 2025-03-25 RX ORDER — PANTOPRAZOLE SODIUM 40 MG/1
40 TABLET, DELAYED RELEASE ORAL
COMMUNITY
Start: 2025-03-13

## 2025-03-25 NOTE — PROGRESS NOTES
[facility] Family Medicine Office Note    HPI:     Mateo Byrd is a 54 year old male presenting for annual exam and erectile dysfunction issues.     Cancer of head of pancreas, cholangiocarcinoma  Noted to have recurrent stomach pains in Feb 2024. US showed dilated CBD. CT with distended GB and CBD dilation. He was subsequently admitted for biliary pancreatitis. Follow up MRCP confirmed CBD dilation but no mass. An ERCP/EUS in March 2024 demonstrated small pancreatic head mass (1.8 cm). Stent was placed and path came back with adenocarcinoma.     Per Chart review: \"Surgery with Dr. 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 LVI/PNI present. Negative margins, 0/17 LN. LOULOU. Of note, omentum negative. pT3 pN0. Case reviewed at [place] [facility]-recommend adjuvant Xeloda...Adjuvant Capecitabine x 8 cycles: 5/21/24-10/23/24. He required dose reduction 1000 mg BID by C3.\"    Has been following up with heme/onc. Repeat CT scans stable apparently. Patient has continued f/u every 3 months.     Erectile dysfunction  Patient endorses issues with achieving erection, may have gotten worse after treatment noted above.     Diet: consistent, oatmeal and blueberries in morning, later on peanut butter crackers, half of a turkey sandwich, banana. Lunch is either grilled chicken breast or bowl of tuna with veggies. Later on granola bar, other half of turkey sandwich. Dinner can vary such as fish or pasta.   Exercise: lifts weights a couple times per week, runs/jogs  Tobacco: smoked about 10 years, pack per day, quit in 2004  Alcohol: a couple drinks in a week  Other Drugs: denies    AAA ultrasound screen? (age 65-75 with any smoking history): not indicated  Aspirin use? (age 50-59 with ASCVD risk 10% or greater): await lipid panel results  Colonoscopy screen? (age 45-75 or earlier based on risk): up to date   Depression  screen? (PHQ-2 or PHQ-9): PHQ-2 Score of 0  Diabetes screen? (age 35 to 70 who are overweight or obese): A1c ordered  Fall Prevention? (age 65 and older): not indicated  Lipid panel? (age 20-35 with increased risk of CHD or older than 35): ordered  Lung cancer screen? (age 50-80 w/ 20 pack year smoking history and currently smoking or quit in last 15 yrs): not indicated    HISTORY:  Past Medical History:    Abdominal pain    Anxiety    Belching    Bloating    Disorder of prostate    Slightly enlarged    Flatulence/gas pain/belching    Food intolerance    Headache disorder    Hemorrhoids    Stented coronary artery    Stress    Wears glasses      Past Surgical History:   Procedure Laterality Date    Colonoscopy  2022    Ercp,diagnostic  24    Other surgical history  1982    left knee/\"removed a band\"    Other surgical history  2024    Pylorus-Preserving Pancreaticoduodenectomy, Omentectomy, Gastrostomy Tube Placement, Jejunostomy Tube Placement, Intra-Abdominal Lymphadenectomy, Intra-Operative Liver Ultrasound, Cholecystectomy, Resection and Repair of the Portal Vein, Myofascial Abdominal Wall Flap      Family History   Problem Relation Age of Onset    Diabetes Mother         prediabetes    Hypertension Mother     Lipids Mother     Breast Cancer Mother 65 - 69    Uterine Cancer Mother 60 - 65    Colon Polyps Mother     Cancer Father 55        abdominal: pancreas vs stomach,  at 56y    Hypertension Father     Hypertension Brother     Lipids Brother     Hypertension Brother     Heart Attack Maternal Grandmother 40 - 49    Hypertension Maternal Grandmother     Cancer Maternal Grandfather 77        bone cancer    Other (Other[other]) Paternal Grandmother         emphysema, smoker    Alcohol and Other Disorders Associated Paternal Grandfather     Other (Other[other]) Daughter         cerebral palsy    Prostate Cancer Paternal Uncle 70 - 75      Social History:   Social History      Socioeconomic History    Marital status:    Tobacco Use    Smoking status: Former     Current packs/day: 0.00     Average packs/day: 1 pack/day for 18.0 years (18.0 ttl pk-yrs)     Types: Cigarettes     Start date: 10/1/1986     Quit date: 10/1/2004     Years since quittin.4    Smokeless tobacco: Former     Types: Chew     Quit date: 1985   Vaping Use    Vaping status: Never Used   Substance and Sexual Activity    Alcohol use: Not Currently     Alcohol/week: 4.0 standard drinks of alcohol     Types: 2 Cans of beer, 2 Standard drinks or equivalent per week     Comment: 3-4drinks/wk    Drug use: Never    Sexual activity: Yes     Partners: Female   Other Topics Concern    Caffeine Concern Yes     Comment: 3x/day    Exercise Yes     Comment: walk, bike, run weights     Social Drivers of Health     Food Insecurity: No Food Insecurity (3/25/2025)    NCSS - Food Insecurity     Worried About Running Out of Food in the Last Year: No     Ran Out of Food in the Last Year: No   Transportation Needs: No Transportation Needs (3/25/2025)    NCSS - Transportation     Lack of Transportation: No   Housing Stability: Not At Risk (3/25/2025)    NCSS - Housing/Utilities     Has Housing: Yes     Worried About Losing Housing: No     Unable to Get Utilities: No        Medications (Active prior to today's visit):  Current Outpatient Medications   Medication Sig Dispense Refill    pantoprazole 40 MG Oral Tab EC Take 1 tablet (40 mg total) by mouth before breakfast.      Sildenafil Citrate 50 MG Oral Tab Take 1 tablet (50 mg total) by mouth daily as needed for Erectile Dysfunction. 30 tablet 0    Zinc 100 MG Oral Tab       TURMERIC CURCUMIN OR       Cholecalciferol (VITAMIN D) 25 MCG (1000 UT) Oral Tab       Lactobacillus (PROBIOTIC ACIDOPHILUS) Oral Cap Take by mouth.      Multiple Vitamins-Minerals (MULTIVITAMIN OR) Take 1 tablet by mouth daily.         Omega-3 Fatty Acids (FISH OIL) 1200 MG Oral Cap Take 1 capsule by  mouth daily.      Loratadine 10 MG Oral Cap Take 1 capsule by mouth daily as needed.         Allergies:  Allergies[1]      ROS:   Review of Systems   Constitutional:  Negative for chills and fever.     Otherwise see HPI    PHYSICAL EXAM:   /80 (BP Location: Left arm, Patient Position: Sitting, Cuff Size: adult)   Pulse 78   Resp 16   Ht 5' 5.98\" (1.676 m)   Wt 161 lb (73 kg)   SpO2 99%   BMI 26.00 kg/m²  Estimated body mass index is 26 kg/m² as calculated from the following:    Height as of this encounter: 5' 5.98\" (1.676 m).    Weight as of this encounter: 161 lb (73 kg).   Vital signs reviewed.Appears stated age, well groomed.    Physical Exam  Constitutional:       General: He is not in acute distress.  HENT:      Nose: Nose normal.      Mouth/Throat:      Mouth: Mucous membranes are moist.      Pharynx: Oropharynx is clear.   Eyes:      Conjunctiva/sclera: Conjunctivae normal.      Pupils: Pupils are equal, round, and reactive to light.   Cardiovascular:      Rate and Rhythm: Normal rate and regular rhythm.      Heart sounds: Normal heart sounds.   Pulmonary:      Effort: Pulmonary effort is normal.      Breath sounds: Normal breath sounds.   Abdominal:      General: Bowel sounds are normal.      Palpations: Abdomen is soft.      Tenderness: There is no abdominal tenderness. There is no guarding or rebound.   Lymphadenopathy:      Cervical: No cervical adenopathy.   Neurological:      Mental Status: He is alert.           ASSESSMENT/PLAN:     54 year old male presenting for annual exam and erectile dysfunction issues.     Annual Physical Exam  - encourage well-balanced diet with fruits/vegetables, limited fried/fatty foods, and limited take-out   - encourage at least 150 minutes of moderate intensity aerobic activity weekly     2. Laboratory examination ordered as part of a routine general medical examination  - CBC With Differential With Platelet; Future  - Comp Metabolic Panel (14); Future  -  Hemoglobin A1C; Future  - TSH W Reflex To Free T4; Future  - Lipid Panel; Future    3. Need for vaccination  - patient may consider [id number] and Shingrix in future   - TdaP (Adacel, Boostrix) [53277]    4. Cancer of head of pancreas  - s/p Whipple and received chemo  - stable, treatment plan per heme-onc  - continue regular follow-up and imaging with heme/onc    6. Erectile dysfunction, unspecified erectile dysfunction type  - trial of viagra   - Sildenafil Citrate 50 MG Oral Tab; Take 1 tablet (50 mg total) by mouth daily as needed for Erectile Dysfunction.  Dispense: 30 tablet; Refill: 0    Follow-up: in 1 year for next annual or sooner as needed    Outcome: Patient verbalizes understanding. Patient is notified to call with any questions, complications, allergies, or worsening or changing symptoms.  Patient is to call with any side effects or complications from the treatments as a result of today.     Total length of visit/charting: approximately 22 min    Honorio Sutton MD, 03/25/25, 9:08 AM      Please note that portions of this note may have been completed with a voice recognition program. Efforts were made to edit the dictations but occasionally words are mis-transcribed.         [1]   Allergies  Allergen Reactions    Seasonal

## 2025-03-26 ENCOUNTER — PATIENT MESSAGE (OUTPATIENT)
Dept: FAMILY MEDICINE CLINIC | Facility: CLINIC | Age: 55
End: 2025-03-26

## 2025-03-26 DIAGNOSIS — C22.1 CHOLANGIOCARCINOMA (HCC): Primary | ICD-10-CM

## 2025-04-01 ENCOUNTER — NURSE ONLY (OUTPATIENT)
Age: 55
End: 2025-04-01
Attending: INTERNAL MEDICINE
Payer: COMMERCIAL

## 2025-04-01 DIAGNOSIS — C22.1 CHOLANGIOCARCINOMA (HCC): ICD-10-CM

## 2025-04-01 LAB
ALBUMIN SERPL-MCNC: 4.4 G/DL (ref 3.2–4.8)
ALBUMIN/GLOB SERPL: 1.8 {RATIO} (ref 1–2)
ALP LIVER SERPL-CCNC: 110 U/L
ALT SERPL-CCNC: 32 U/L
ANION GAP SERPL CALC-SCNC: 6 MMOL/L (ref 0–18)
AST SERPL-CCNC: 24 U/L (ref ?–34)
BASOPHILS # BLD AUTO: 0.03 X10(3) UL (ref 0–0.2)
BASOPHILS NFR BLD AUTO: 0.5 %
BILIRUB SERPL-MCNC: 0.5 MG/DL (ref 0.3–1.2)
BUN BLD-MCNC: 23 MG/DL (ref 9–23)
CALCIUM BLD-MCNC: 10.1 MG/DL (ref 8.7–10.6)
CHLORIDE SERPL-SCNC: 104 MMOL/L (ref 98–112)
CO2 SERPL-SCNC: 28 MMOL/L (ref 21–32)
CREAT BLD-MCNC: 1.08 MG/DL
EGFRCR SERPLBLD CKD-EPI 2021: 81 ML/MIN/1.73M2 (ref 60–?)
EOSINOPHIL # BLD AUTO: 0.07 X10(3) UL (ref 0–0.7)
EOSINOPHIL NFR BLD AUTO: 1.3 %
ERYTHROCYTE [DISTWIDTH] IN BLOOD BY AUTOMATED COUNT: 12.8 %
GGT SERPL-CCNC: 88 U/L
GLOBULIN PLAS-MCNC: 2.4 G/DL (ref 2–3.5)
GLUCOSE BLD-MCNC: 122 MG/DL (ref 70–99)
HCT VFR BLD AUTO: 45.6 %
HGB BLD-MCNC: 15.5 G/DL
IMM GRANULOCYTES # BLD AUTO: 0.01 X10(3) UL (ref 0–1)
IMM GRANULOCYTES NFR BLD: 0.2 %
LYMPHOCYTES # BLD AUTO: 1.52 X10(3) UL (ref 1–4)
LYMPHOCYTES NFR BLD AUTO: 27.2 %
MCH RBC QN AUTO: 28.8 PG (ref 26–34)
MCHC RBC AUTO-ENTMCNC: 34 G/DL (ref 31–37)
MCV RBC AUTO: 84.6 FL
MONOCYTES # BLD AUTO: 0.45 X10(3) UL (ref 0.1–1)
MONOCYTES NFR BLD AUTO: 8.1 %
NEUTROPHILS # BLD AUTO: 3.5 X10 (3) UL (ref 1.5–7.7)
NEUTROPHILS # BLD AUTO: 3.5 X10(3) UL (ref 1.5–7.7)
NEUTROPHILS NFR BLD AUTO: 62.7 %
OSMOLALITY SERPL CALC.SUM OF ELEC: 291 MOSM/KG (ref 275–295)
PLATELET # BLD AUTO: 213 10(3)UL (ref 150–450)
POTASSIUM SERPL-SCNC: 3.9 MMOL/L (ref 3.5–5.1)
PROT SERPL-MCNC: 6.8 G/DL (ref 5.7–8.2)
RBC # BLD AUTO: 5.39 X10(6)UL
SODIUM SERPL-SCNC: 138 MMOL/L (ref 136–145)
WBC # BLD AUTO: 5.6 X10(3) UL (ref 4–11)

## 2025-05-13 NOTE — PROGRESS NOTES
[facility] Note    Diagnosis:   Distal CBD Cholangiocardinoma, pT3 pN0 cM0 MMR intact     Treatment History:   Whipple with  Dr. Corral on 4/9/24  Adjuvant Xeloda     Visit Diagnosis:  1. Cholangiocarcinoma (HCC)          History of Present Illness: 55M referred by Dr. Reyez to discuss pancreatic adenocarcinoma    -02/2024: Noted to have recurrent stomach pain, RUQ, radiating to back. US showed dilated CBD. CT with distended GB and CBD dilation. He was admitted for biliary pancreatitis. Follow up MRCP confirmed CBD dilation but no mass.    -ERCP/EUS in 03/2024: small pancreatic head mass, 1.8 cm. Stent placed. Path with adenocarcinoma.  23.9.     -CT Pancreas protocol at [facility] without vessel involvement     -FH of cancer: Mom with breast and uterine in 60-70s, father with gastric or pancreatic    -Surgery with Dr. 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 LVI/PNI present. Negative margins, 0/17 LN. LOULOU. Of note, omentum negative. pT3 pN0. Case reviewed at [place] [facility] board-recommend adjuvant Xeloda.     -Adjuvant Capecitabine x 8 cycles: 5/21/24-10/23/24. He required dose reduction 1000 mg BID by C3.     -CT CAP 11/2024: [facility]  -CT CAP 2/10/25 at [facility]-stable- possible small 8 x 12 mm LN anterior hepatic artery which appears stable    Interval History  -CT at [facility] scheduled for 5/16  -doing well. Has no new complaints   -Normal bowels. No cough, dyspnea, abdominal pain or changes in bowel habits     Review of Systems: 12 Point ROS was completed and pertinent positives are in the HPI    Current Outpatient Medications on File Prior to Visit   Medication Sig Dispense Refill    pantoprazole 40 MG Oral Tab EC Take 1 tablet (40 mg total) by mouth before breakfast.      Sildenafil Citrate 50 MG Oral Tab Take 1 tablet (50 mg total) by mouth daily as needed for Erectile Dysfunction.  30 tablet 0    Zinc 100 MG Oral Tab       TURMERIC CURCUMIN OR       Cholecalciferol (VITAMIN D) 25 MCG (1000 UT) Oral Tab       Lactobacillus (PROBIOTIC ACIDOPHILUS) Oral Cap Take by mouth.      Multiple Vitamins-Minerals (MULTIVITAMIN OR) Take 1 tablet by mouth in the morning.   .      Omega-3 Fatty Acids (FISH OIL) 1200 MG Oral Cap Take 1 capsule by mouth in the morning.      Loratadine 10 MG Oral Cap Take 1 capsule by mouth daily as needed.       No current facility-administered medications on file prior to visit.     Past Medical History:    Abdominal pain    Anxiety    Belching    Bloating    Disorder of prostate    Slightly enlarged    Flatulence/gas pain/belching    Food intolerance    Headache disorder    Hemorrhoids    Stented coronary artery    Stress    Wears glasses     Past Surgical History:   Procedure Laterality Date    Colonoscopy  2022    Ercp,diagnostic  24    Other surgical history  1982    left knee/\"removed a band\"    Other surgical history  2024    Pylorus-Preserving Pancreaticoduodenectomy, Omentectomy, Gastrostomy Tube Placement, Jejunostomy Tube Placement, Intra-Abdominal Lymphadenectomy, Intra-Operative Liver Ultrasound, Cholecystectomy, Resection and Repair of the Portal Vein, Myofascial Abdominal Wall Flap     Social History     Socioeconomic History    Marital status:    Tobacco Use    Smoking status: Former     Current packs/day: 0.00     Average packs/day: 1 pack/day for 18.0 years (18.0 ttl pk-yrs)     Types: Cigarettes     Start date: 10/1/1986     Quit date: 10/1/2004     Years since quittin.6    Smokeless tobacco: Former     Types: Chew     Quit date: 1985   Vaping Use    Vaping status: Never Used   Substance and Sexual Activity    Alcohol use: Not Currently     Alcohol/week: 4.0 standard drinks of alcohol     Types: 2 Cans of beer, 2 Standard drinks or equivalent per week     Comment: 3-4drinks/wk    Drug use: Never    Sexual activity:  Yes     Partners: Female      Family History   Problem Relation Age of Onset    Diabetes Mother         prediabetes    Hypertension Mother     Lipids Mother     Breast Cancer Mother 65 - 69    Uterine Cancer Mother 60 - 65    Colon Polyps Mother     Cancer Father 55        abdominal: pancreas vs stomach,  at 56y    Hypertension Father     Hypertension Brother     Lipids Brother     Hypertension Brother     Heart Attack Maternal Grandmother 40 - 49    Hypertension Maternal Grandmother     Cancer Maternal Grandfather 77        bone cancer    Other (Other[other]) Paternal Grandmother         emphysema, smoker    Alcohol and Other Disorders Associated Paternal Grandfather     Other (Other[other]) Daughter         cerebral palsy    Prostate Cancer Paternal Uncle 70 - 75       Physical Exam  Height: 167.6 cm (5' 5.98\") (849)  Weight: 72.8 kg (160 lb 6.4 oz) (849)  BSA (Calculated - sq m): 1.82 sq meters (849)  Pulse: 58 (849)  BP: 130/75 (849)  Temp: 97.3 °F (36.3 °C) (849)  Do Not Use - Resp Rate: --  SpO2: 96 % (849)    General: NAD, AOX3  HEENT: clear op, mmm, no jvd, no scleral icterus  CV: RR  Extremities: No edema. Mild erythema in hands   Lungs: =no increased work of breathing  Abd: non distended   Neuro: CN: II-XII grossly intact      Results:  Lab Results   Component Value Date    WBC 5.2 2025    HGB 14.9 2025    HCT 43.8 2025    MCV 86.1 2025    .0 2025     Lab Results   Component Value Date     2025    K 4.0 2025    CO2 26.0 2025     2025    BUN 20 2025    GLUCOSE 89 2007    ALB 4.7 2025       No results found for: \"LDH\"    Radiology: reviewed     Pathology: reviewed   Final Diagnosis    A. Omentum, omentectomy:  - Adipose tissues of the omentum, negative for metastasis.  - Incidental lymph node, negative for metastasis (0\1).     B. Gallbladder, cholecystectomy:  -  Chronic cholecystitis.     C. Common bile duct margin:  - Negative for malignancy.     D. Pancreas margin:  - Negative for malignancy.     E. Retroperitoneal (uncinate) pancreas margin:  - Negative for malignancy.     F. Pancreas and duodenum, Whipple procedure:  - Moderately differentiated cholangiocarcinoma, 3.4 cm, involving the pancreas and duodenal muscularis propria , see summary form.  - LVSI and perineural invasion present.  - Proximal and distal small bowel margins of resection are negative.  - 17 lymph nodes are negative for metastasis (0\17).       Assessment and Plan:  Distal CBD Cholangiocardinoma, pT3 pN0 cM0 MMR intact   -Normal . Genetics unremarkable   -S/P Whipple with Dr. Corral on 4/9/24  -Finished 6 months  Xeloda for 6 months. Started 5/21/24. Given worsening HFS, Xeloda was DR to 1000 BID D1-14 q21 days. Finished 10/2024  -Repeat imaging q3 months for 2 years followed by every 6 months for 3 years.  He has imaging done at [facility] with his surgical oncologist (Dr. Corral).      RTC in 3 months     [facility]

## 2025-05-14 ENCOUNTER — OFFICE VISIT (OUTPATIENT)
Age: 55
End: 2025-05-14
Attending: INTERNAL MEDICINE
Payer: COMMERCIAL

## 2025-05-14 VITALS
TEMPERATURE: 97 F | DIASTOLIC BLOOD PRESSURE: 75 MMHG | WEIGHT: 160.38 LBS | RESPIRATION RATE: 16 BRPM | BODY MASS INDEX: 25.78 KG/M2 | HEIGHT: 65.98 IN | SYSTOLIC BLOOD PRESSURE: 130 MMHG | OXYGEN SATURATION: 96 % | HEART RATE: 58 BPM

## 2025-05-14 DIAGNOSIS — C22.1 CHOLANGIOCARCINOMA (HCC): Primary | ICD-10-CM

## 2025-05-14 LAB
ALBUMIN SERPL-MCNC: 4.7 G/DL (ref 3.2–4.8)
ALBUMIN/GLOB SERPL: 1.9 {RATIO} (ref 1–2)
ALP LIVER SERPL-CCNC: 98 U/L (ref 45–117)
ALT SERPL-CCNC: 44 U/L (ref 10–49)
ANION GAP SERPL CALC-SCNC: 7 MMOL/L (ref 0–18)
AST SERPL-CCNC: 34 U/L (ref ?–34)
BASOPHILS # BLD AUTO: 0.04 X10(3) UL (ref 0–0.2)
BASOPHILS NFR BLD AUTO: 0.8 %
BILIRUB SERPL-MCNC: 0.7 MG/DL (ref 0.3–1.2)
BUN BLD-MCNC: 20 MG/DL (ref 9–23)
CALCIUM BLD-MCNC: 9.7 MG/DL (ref 8.7–10.6)
CANCER AG19-9 SERPL-ACNC: 8.9 U/ML (ref ?–35)
CEA SERPL-MCNC: 1.9 NG/ML (ref ?–5)
CHLORIDE SERPL-SCNC: 107 MMOL/L (ref 98–112)
CO2 SERPL-SCNC: 26 MMOL/L (ref 21–32)
CREAT BLD-MCNC: 1.14 MG/DL (ref 0.7–1.3)
EGFRCR SERPLBLD CKD-EPI 2021: 75 ML/MIN/1.73M2 (ref 60–?)
EOSINOPHIL # BLD AUTO: 0.09 X10(3) UL (ref 0–0.7)
EOSINOPHIL NFR BLD AUTO: 1.7 %
ERYTHROCYTE [DISTWIDTH] IN BLOOD BY AUTOMATED COUNT: 13.2 %
FASTING STATUS PATIENT QL REPORTED: NO
GLOBULIN PLAS-MCNC: 2.5 G/DL (ref 2–3.5)
GLUCOSE BLD-MCNC: 92 MG/DL (ref 70–99)
HCT VFR BLD AUTO: 43.8 % (ref 39–53)
HGB BLD-MCNC: 14.9 G/DL (ref 13–17.5)
IMM GRANULOCYTES # BLD AUTO: 0.01 X10(3) UL (ref 0–1)
IMM GRANULOCYTES NFR BLD: 0.2 %
LYMPHOCYTES # BLD AUTO: 1.64 X10(3) UL (ref 1–4)
LYMPHOCYTES NFR BLD AUTO: 31.5 %
MCH RBC QN AUTO: 29.3 PG (ref 26–34)
MCHC RBC AUTO-ENTMCNC: 34 G/DL (ref 31–37)
MCV RBC AUTO: 86.1 FL (ref 80–100)
MONOCYTES # BLD AUTO: 0.5 X10(3) UL (ref 0.1–1)
MONOCYTES NFR BLD AUTO: 9.6 %
NEUTROPHILS # BLD AUTO: 2.92 X10 (3) UL (ref 1.5–7.7)
NEUTROPHILS # BLD AUTO: 2.92 X10(3) UL (ref 1.5–7.7)
NEUTROPHILS NFR BLD AUTO: 56.2 %
OSMOLALITY SERPL CALC.SUM OF ELEC: 292 MOSM/KG (ref 275–295)
PLATELET # BLD AUTO: 206 10(3)UL (ref 150–450)
POTASSIUM SERPL-SCNC: 4 MMOL/L (ref 3.5–5.1)
PROT SERPL-MCNC: 7.2 G/DL (ref 5.7–8.2)
RBC # BLD AUTO: 5.09 X10(6)UL (ref 4.3–5.7)
SODIUM SERPL-SCNC: 140 MMOL/L (ref 136–145)
WBC # BLD AUTO: 5.2 X10(3) UL (ref 4–11)

## 2025-05-14 NOTE — PROGRESS NOTES
Patient here for follow-up. Will have his CT scan soon. Has some nausea that self resolves. He denies any updates or changes since last visit.

## 2025-07-27 ENCOUNTER — HOSPITAL ENCOUNTER (OUTPATIENT)
Age: 55
Discharge: HOME | End: 2025-07-27
Payer: COMMERCIAL

## 2025-07-27 VITALS
WEIGHT: 150 LBS | TEMPERATURE: 98 F | SYSTOLIC BLOOD PRESSURE: 158 MMHG | RESPIRATION RATE: 16 BRPM | HEART RATE: 96 BPM | DIASTOLIC BLOOD PRESSURE: 95 MMHG | OXYGEN SATURATION: 97 % | BODY MASS INDEX: 24.11 KG/M2 | HEIGHT: 66 IN

## 2025-07-27 DIAGNOSIS — K64.9 HEMORRHOIDS, UNSPECIFIED HEMORRHOID TYPE: Primary | ICD-10-CM

## 2025-07-27 LAB
#MXD IC: 0.6 X10ˆ3/UL (ref 0.1–1)
HCT VFR BLD AUTO: 45.4 % (ref 39–53)
HGB BLD-MCNC: 14.6 G/DL (ref 13–17.5)
LYMPHOCYTES # BLD AUTO: 1.1 X10ˆ3/UL (ref 1–4)
LYMPHOCYTES NFR BLD AUTO: 9.6 %
MCH RBC QN AUTO: 27.9 PG (ref 26–34)
MCHC RBC AUTO-ENTMCNC: 32.2 G/DL (ref 31–37)
MCV RBC AUTO: 86.8 FL (ref 80–100)
MIXED CELL %: 4.8 %
NEUTROPHILS # BLD AUTO: 10.1 X10ˆ3/UL (ref 1.5–7.7)
NEUTROPHILS NFR BLD AUTO: 85.6 %
PLATELET # BLD AUTO: 239 X10ˆ3/UL (ref 150–450)
RBC # BLD AUTO: 5.23 X10ˆ6/UL (ref 4.3–5.7)
WBC # BLD AUTO: 11.8 X10ˆ3/UL (ref 4–11)

## 2025-07-27 RX ORDER — HYDROCORTISONE ACETATE 25 MG/1
25 SUPPOSITORY RECTAL 2 TIMES DAILY PRN
Qty: 12 SUPPOSITORY | Refills: 0 | Status: SHIPPED | OUTPATIENT
Start: 2025-07-27 | End: 2025-08-26

## 2025-07-27 NOTE — ED PROVIDER NOTES
Patient Seen in: [facility]       The following individual(s) verbally consented to be recorded using ambient AI listening technology and understand that they can each withdraw their consent to this listening technology at any point by asking the clinician to turn off or pause the recording:    Patient name: Mateo Byrd      History  Chief Complaint   Patient presents with    Rectal Bleeding     Stated Complaint: Urinary issue    Subjective:   HPI     Mateo Byrd is a 55 year old male with bile duct cancer status post Whipple procedure who presents with rectal bleeding.    He has bright red blood in the toilet, primarily when wiping, and a small amount on the stool. The water in the toilet appears discolored, similar to 'food coloring'. No rectal pain or trauma. No recent constipation or straining during bowel movements, although he has experienced constipation in the past.    He is currently taking pantoprazole for gastritis, which was prescribed following his Whipple procedure in April 2024 to manage acid levels post-surgery.    He has a history of bile duct cancer, for which he underwent a Whipple procedure. This involved the removal of part of his pancreas, gallbladder, bile duct, and part of the small intestine. He follows up with his surgeon and oncologist every three months and undergoes regular scans to monitor for remission. He previously experienced symptoms of burning in the stomach area and back pain related to his bile duct cancer, but he has never experienced rectal bleeding before.        Objective:     No pertinent past medical history.            No pertinent past surgical history.              No pertinent social history.            Review of Systems   Constitutional: Negative.    HENT: Negative.     Eyes: Negative.    Respiratory: Negative.     Cardiovascular: Negative.    Gastrointestinal:  Positive for anal bleeding and blood in stool.   Endocrine: Negative.     Genitourinary: Negative.    Musculoskeletal: Negative.    Skin: Negative.    Allergic/Immunologic: Negative.    Neurological: Negative.    Hematological: Negative.    Psychiatric/Behavioral: Negative.         Positive for stated complaint: Urinary issue  Other systems are as noted in HPI.  Constitutional and vital signs reviewed.      All other systems reviewed and negative except as noted above.                  Physical Exam    ED Triage Vitals [07/27/25 1234]   BP (!) 158/95   Pulse 96   Resp 16   Temp 98.1 °F (36.7 °C)   Temp src Oral   SpO2 97 %   O2 Device None (Room air)       Current Vitals:   Vital Signs  BP: (!) 158/95  Pulse: 96  Resp: 16  Temp: 98.1 °F (36.7 °C)  Temp src: Oral    Oxygen Therapy  SpO2: 97 %  O2 Device: None (Room air)            Physical Exam  Vitals and nursing note reviewed. Exam conducted with a chaperone present.   Genitourinary:     Rectum: Guaiac result negative.      Comments: Soft large hemorrhoid appreciated at 5:00 on the rectum.  There is no evidence of bleeding coming out of the rectum.  Skin:     General: Skin is warm.      Capillary Refill: Capillary refill takes less than 2 seconds.   Psychiatric:         Mood and Affect: Mood normal.               ED Course  Labs Reviewed   POCT CBC - Abnormal; Notable for the following components:       Result Value    WBC IC 11.8 (*)     # Neutrophil 10.1 (*)     All other components within normal limits                            MDM     Mateo Byrd is a 55 year old male with bile duct cancer status post Whipple procedure who presents with rectal bleeding.    He has bright red blood in the toilet, primarily when wiping, and a small amount on the stool. The water in the toilet appears discolored, similar to 'food coloring'. No rectal pain or trauma. No recent constipation or straining during bowel movements, although he has experienced constipation in the past.    He is currently taking pantoprazole for gastritis, which was  prescribed following his Whipple procedure in April 2024 to manage acid levels post-surgery.    He has a history of bile duct cancer, for which he underwent a Whipple procedure. This involved the removal of part of his pancreas, gallbladder, bile duct, and part of the small intestine. He follows up with his surgeon and oncologist every three months and undergoes regular scans to monitor for remission. He previously experienced symptoms of burning in the stomach area and back pain related to his bile duct cancer, but he has never experienced rectal bleeding before.  Vital signs: Please see EMR.  Physical exam: Please see exam.  Differential diagnosis: Internal hemorrhoid, external hemorrhoid, GI bleed, anemia.  Per chart review patient recently underwent a CT of the abdomen and pelvis at [facility] without any acute changes at that time.  Patient was diagnosed with history of internal hemorrhoids from a colonoscopy in his past.  Recent Results (from the past 24 hours)   POCT CBC    Collection Time: 07/27/25 12:54 PM   Result Value Ref Range    WBC IC 11.8 (H) 4.0 - 11.0 x10ˆ3/uL    RBC IC 5.23 4.30 - 5.70 X10ˆ6/uL    HGB IC 14.6 13.0 - 17.5 g/dL    HCT IC 45.4 39.0 - 53.0 %    MCV IC 86.8 80.0 - 100.0 fL    MCH IC 27.9 26.0 - 34.0 pg    MCHC IC 32.2 31.0 - 37.0 g/dL    PLT .0 150.0 - 450.0 X10ˆ3/uL    # Neutrophil 10.1 (H) 1.5 - 7.7 X10ˆ3/uL    # Lymphocyte 1.1 1.0 - 4.0 X10ˆ3/uL    # Mixed Cells 0.6 0.1 - 1.0 X10ˆ3/uL    Neutrophil % 85.6 %    Lymphocyte % 9.6 %    Mixed Cell % 4.8 %     Based on physical exam and HPI will diagnosed with hemorrhoid.  This likely caused patient to see blood in the toilet and on his stool when he wiped.  Patient is to follow-up with his gastroenterologist.  Will prescribe Anusol suppositories to help treat the hemorrhoid.  ED precautions given.        Medical Decision Making  Mateo Byrd is a 55 year old male with bile duct cancer status post Whipple procedure  who presents with rectal bleeding.    He has bright red blood in the toilet, primarily when wiping, and a small amount on the stool. The water in the toilet appears discolored, similar to 'food coloring'. No rectal pain or trauma. No recent constipation or straining during bowel movements, although he has experienced constipation in the past.    He is currently taking pantoprazole for gastritis, which was prescribed following his Whipple procedure in April 2024 to manage acid levels post-surgery.    He has a history of bile duct cancer, for which he underwent a Whipple procedure. This involved the removal of part of his pancreas, gallbladder, bile duct, and part of the small intestine. He follows up with his surgeon and oncologist every three months and undergoes regular scans to monitor for remission. He previously experienced symptoms of burning in the stomach area and back pain related to his bile duct cancer, but he has never experienced rectal bleeding before.    Problems Addressed:  Hemorrhoids, unspecified hemorrhoid type: acute illness or injury    Amount and/or Complexity of Data Reviewed  External Data Reviewed: radiology and notes.  Labs: ordered. Decision-making details documented in ED Course.     Details: Recent Results (from the past 24 hours)  -POCT CBC:   Collection Time: 07/27/25 12:54 PM       Result                      Value             Ref Range           WBC IC                      11.8 (H)          4.0 - 11.0 x*       RBC IC                      5.23              4.30 - 5.70 *       HGB IC                      14.6              13.0 - 17.5 *       HCT IC                      45.4              39.0 - 53.0 %       MCV IC                      86.8              80.0 - 100.0*       MCH IC                      27.9              26.0 - 34.0 *       MCHC IC                     32.2              31.0 - 37.0 *       PLT IC                      239.0             150.0 - 450.*       # Neutrophil                 10.1 (H)          1.5 - 7.7 X1*       # Lymphocyte                1.1               1.0 - 4.0 X1*       # Mixed Cells               0.6               0.1 - 1.0 X1*       Neutrophil %                85.6              %                   Lymphocyte %                9.6               %                   Mixed Cell %                4.8               %                  Risk  Prescription drug management.        Disposition and Plan     Clinical Impression:  1. Hemorrhoids, unspecified hemorrhoid type         Disposition:  Discharge  7/27/2025 12:58 pm    Follow-up:  Gene Galarza MD  [address] [place] [id number] [phone]    Schedule an appointment as soon as possible for a visit             Medications Prescribed:  Current Discharge Medication List        START taking these medications    Details   hydrocortisone 25 MG Rectal Suppos Place 1 suppository (25 mg total) rectally 2 (two) times daily as needed for Hemorrhoids.  Qty: 12 suppository, Refills: 0                   Supplementary Documentation:

## 2025-07-27 NOTE — DISCHARGE INSTRUCTIONS
VISIT SUMMARY:  Today, you came in because you noticed bright red blood in the toilet, especially when wiping, and a small amount on your stool. You have no pain or trauma, and you haven't had recent constipation or straining during bowel movements. You are currently taking pantoprazole for gastritis following your Whipple procedure in April 2024. You have a history of bile duct cancer and are in remission, with regular follow-ups with your surgeon and oncologist.    YOUR PLAN:  -RECTAL BLEEDING: Rectal bleeding means there is blood coming from your rectum, which could be due to various reasons. Since you have no pain or trauma, and this is your first episode, we will perform a rectal examination to identify the source of the bleeding and check your hemoglobin levels to ensure you are not anemic.        INSTRUCTIONS:  Please follow-up with your gastroenterologist in regard being your stool changes you experienced today.  If you develop any worsening symptoms please go to the ER for further evaluation.    Contains text generated by [employer]

## 2025-08-19 ENCOUNTER — OFFICE VISIT (OUTPATIENT)
Facility: LOCATION | Age: 55
End: 2025-08-19
Attending: INTERNAL MEDICINE

## 2025-08-19 VITALS
TEMPERATURE: 98 F | OXYGEN SATURATION: 99 % | RESPIRATION RATE: 16 BRPM | BODY MASS INDEX: 25.26 KG/M2 | WEIGHT: 157.19 LBS | SYSTOLIC BLOOD PRESSURE: 136 MMHG | HEIGHT: 65.98 IN | DIASTOLIC BLOOD PRESSURE: 83 MMHG | HEART RATE: 57 BPM

## 2025-08-19 DIAGNOSIS — C22.1 CHOLANGIOCARCINOMA (HCC): ICD-10-CM

## 2025-08-19 LAB
ALBUMIN SERPL-MCNC: 4.7 G/DL (ref 3.2–4.8)
ALBUMIN/GLOB SERPL: 2 (ref 1–2)
ALP LIVER SERPL-CCNC: 112 U/L (ref 45–117)
ALT SERPL-CCNC: 34 U/L (ref 10–49)
ANION GAP SERPL CALC-SCNC: 6 MMOL/L (ref 0–18)
AST SERPL-CCNC: 26 U/L (ref ?–34)
BASOPHILS # BLD AUTO: 0.02 X10(3) UL (ref 0–0.2)
BASOPHILS NFR BLD AUTO: 0.3 %
BILIRUB SERPL-MCNC: 0.5 MG/DL (ref 0.3–1.2)
BUN BLD-MCNC: 14 MG/DL (ref 9–23)
CALCIUM BLD-MCNC: 10.1 MG/DL (ref 8.7–10.6)
CANCER AG19-9 SERPL-ACNC: 15.9 U/ML (ref ?–35)
CHLORIDE SERPL-SCNC: 103 MMOL/L (ref 98–112)
CO2 SERPL-SCNC: 28 MMOL/L (ref 21–32)
CREAT BLD-MCNC: 1.13 MG/DL (ref 0.7–1.3)
EGFRCR SERPLBLD CKD-EPI 2021: 76 ML/MIN/1.73M2 (ref 60–?)
EOSINOPHIL # BLD AUTO: 0.13 X10(3) UL (ref 0–0.7)
EOSINOPHIL NFR BLD AUTO: 2.1 %
ERYTHROCYTE [DISTWIDTH] IN BLOOD BY AUTOMATED COUNT: 12.9 %
GLOBULIN PLAS-MCNC: 2.4 G/DL (ref 2–3.5)
GLUCOSE BLD-MCNC: 80 MG/DL (ref 70–99)
HCT VFR BLD AUTO: 45.3 % (ref 39–53)
HGB BLD-MCNC: 15.3 G/DL (ref 13–17.5)
IMM GRANULOCYTES # BLD AUTO: 0.01 X10(3) UL (ref 0–1)
IMM GRANULOCYTES NFR BLD: 0.2 %
LYMPHOCYTES # BLD AUTO: 1.93 X10(3) UL (ref 1–4)
LYMPHOCYTES NFR BLD AUTO: 31.3 %
MCH RBC QN AUTO: 28.7 PG (ref 26–34)
MCHC RBC AUTO-ENTMCNC: 33.8 G/DL (ref 31–37)
MCV RBC AUTO: 84.8 FL (ref 80–100)
MONOCYTES # BLD AUTO: 0.58 X10(3) UL (ref 0.1–1)
MONOCYTES NFR BLD AUTO: 9.4 %
NEUTROPHILS # BLD AUTO: 3.5 X10 (3) UL (ref 1.5–7.7)
NEUTROPHILS # BLD AUTO: 3.5 X10(3) UL (ref 1.5–7.7)
NEUTROPHILS NFR BLD AUTO: 56.7 %
OSMOLALITY SERPL CALC.SUM OF ELEC: 283 MOSM/KG (ref 275–295)
PLATELET # BLD AUTO: 230 10(3)UL (ref 150–450)
POTASSIUM SERPL-SCNC: 4.3 MMOL/L (ref 3.5–5.1)
PROT SERPL-MCNC: 7.1 G/DL (ref 5.7–8.2)
RBC # BLD AUTO: 5.34 X10(6)UL (ref 4.3–5.7)
SODIUM SERPL-SCNC: 137 MMOL/L (ref 136–145)
WBC # BLD AUTO: 6.2 X10(3) UL (ref 4–11)

## (undated) DEVICE — 3M™ RED DOT™ MONITORING ELECTRODE WITH FOAM TAPE AND STICKY GEL, 50/BAG, 20/CASE, 72/PLT 2570: Brand: RED DOT™

## (undated) DEVICE — ACROBAT 2 CALIBRATED TIP WIRE GUIDE: Brand: ACROBAT

## (undated) DEVICE — KIT CUSTOM ENDOPROCEDURE STERIS

## (undated) DEVICE — BITEBLOCK ENDOSCP 60FR MAXI STRP

## (undated) DEVICE — 10FT COMBINED O2 DELIVERY/CO2 MONITORING. FILTER WITH MICROSTREAM TYPE LUER: Brand: DUAL ADULT NASAL CANNULA

## (undated) DEVICE — SYRINGE MED 10ML LL TIP W/O SFTY DISP

## (undated) DEVICE — 1200CC GUARDIAN II: Brand: GUARDIAN

## (undated) DEVICE — SINGLE USE DISTAL COVER MAJ-2315: Brand: SINGLE USE DISTAL COVER

## (undated) DEVICE — ENDOSCOPIC ULTRASOUND FINE NEEDLE BIOPSY (FNB) DEVICE: Brand: ACQUIRE

## (undated) DEVICE — KIT VLV 5 PC AIR H2O SUCT BX ENDOGATOR CONN

## (undated) DEVICE — CANNULATING SPHINCTEROTOME: Brand: AUTOTOME™ RX 44

## (undated) DEVICE — SNAPLOC WIRE GUIDE LOCKING DEVICE: Brand: SNAPLOC

## (undated) NOTE — Clinical Note
FYI: TCM completed. NCM attempted to schedule TCM/HFU, patient declined will call office. TE to PCP office re: appointment. Thank you.

## (undated) NOTE — LETTER
[address]  [address]  Authorization for Surgical Operation and Procedure     Date:___________                                                                                                         Time:__________  I hereby authorize Surgeon(s):  Gene Galarza MD, my physician and his/her assistants (if applicable), which may include medical students, residents, and/or fellows, to perform the following surgical operation/ procedure and administer such anesthesia as may be determined necessary by my physician:  Operation/Procedure name (s) Procedure(s):  ENDOSCOPIC ULTRASOUND (EUS), ENDOSCOPIC RETROGRADE CHOLANGIOPANCREATOGRAPHY (ERCP)  ENDOSCOPIC RETROGRADE CHOLANGIOPANCREATOGRAPHY (ERCP) on Mateo Byrd   2.   I recognize that during the surgical operation/procedure, unforeseen conditions may necessitate additional or different procedures than those listed above.  I, therefore, further authorize and request that the above-named surgeon, assistants, or designees perform such procedures as are, in their judgment, necessary and desirable.    3.   My surgeon/physician has discussed prior to my surgery the potential benefits, risks and side effects of this procedure; the likelihood of achieving goals; and potential problems that might occur during recuperation.  They also discussed reasonable alternatives to the procedure, including risks, benefits, and side effects related to the alternatives and risks related to not receiving this procedure.  I have had all my questions answered and I acknowledge that no guarantee has been made as to the result that may be obtained.    4.   Should the need arise during my operation/procedure, which includes change of level of care prior to discharge, I also consent to the administration of blood and/or blood products.  Further, I understand that despite careful testing and screening of blood or blood products by collecting agencies,  I may still be subject to ill effects as a result of receiving a blood transfusion and/or blood products.  The following are some, but not all, of the potential risks that can occur: fever and allergic reactions, hemolytic reactions, transmission of diseases such as Hepatitis, AIDS and Cytomegalovirus (CMV) and fluid overload.  In the event that I wish to have an autologous transfusion of my own blood, or a directed donor transfusion, I will discuss this with my physician.  Check only if Refusing Blood or Blood Products  I understand refusal of blood or blood products as deemed necessary by my physician may have serious consequences to my condition to include possible death. I hereby assume responsibility for my refusal and release the hospital, its personnel, and my physicians from any responsibility for the consequences of my refusal.          o  Refuse      5.   I authorize the use of any specimen, organs, tissues, body parts or foreign objects that may be removed from my body during the operation/procedure for diagnosis, research or teaching purposes and their subsequent disposal by hospital authorities.  I also authorize the release of specimen test results and/or written reports to my treating physician on the hospital medical staff or other referring or consulting physicians involved in my care, at the discretion of the Pathologist or my treating physician.    6.   I consent to the photographing or videotaping of the operations or procedures to be performed, including appropriate portions of my body for medical, scientific, or educational purposes, provided my identity is not revealed by the pictures or by descriptive texts accompanying them.  If the procedure has been photographed/videotaped, the surgeon will obtain the original picture, image, videotape or CD.  The hospital will not be responsible for storage, release or maintenance of the picture, image, tape or CD.    7.   I consent to the presence of a   or observers in the operating room as deemed necessary by my physician or their designees.    8.   I recognize that in the event my procedure results in extended X-Ray/fluoroscopy time, I may develop a skin reaction.    9. If I have a Do Not Attempt Resuscitation (DNAR) order in place, that status will be suspended while in the operating room, procedural suite, and during the recovery period unless otherwise explicitly stated by me (or a person authorized to consent on my behalf). The surgeon or my attending physician will determine when the applicable recovery period ends for purposes of reinstating the DNAR order.  10. Patients having a sterilization procedure: I understand that if the procedure is successful the results will be permanent and it will therefore be impossible for me to inseminate, conceive, or bear children.  I also understand that the procedure is intended to result in sterility, although the result has not been guaranteed.   11. I acknowledge that my physician has explained sedation/analgesia administration to me including the risk and benefits I consent to the administration of sedation/analgesia as may be necessary or desirable in the judgment of my physician.    I CERTIFY THAT I HAVE READ AND FULLY UNDERSTAND THE ABOVE CONSENT TO OPERATION and/or OTHER PROCEDURE.    _________________________________________  __________________________________  Signature of Patient     Signature of Responsible Person         ___________________________________         Printed Name of Responsible Person           _________________________________                 Relationship to Patient  _________________________________________  ______________________________  Signature of Witness          Date  Time      Patient Name: Mateo Hoeliudrene     : 1970                 Printed: 2024     Medical Record #: [id number]                     Page 2 of 3                                     [address]  [address]    Consent for Anesthesia    I, Mateo Byrd agree to be cared for by an anesthesiologist, who is specially trained to monitor me and give me medicine to put me to sleep or keep me comfortable during my procedure    I understand that my anesthesiologist is not an employee or agent of [facility] or [employer]. He or she works for [employer].    As the patient asking for anesthesia services, I agree to:  Allow the anesthesiologist (anesthesia doctor) to give me medicine and do additional procedures as necessary. Some examples are: Starting or using an “IV” to give me medicine, fluids or blood during my procedure, and having a breathing tube placed to help me breathe when I’m asleep (intubation). In the event that my heart stops working properly, I understand that my anesthesiologist will make every effort to sustain my life, unless otherwise directed by [facility] Do Not Resuscitate documents.  Tell my anesthesia doctor before my procedure:  If I am pregnant.  The last time that I ate or drank.  All of the medicines I take (including prescriptions, herbal supplements, and pills I can buy without a prescription (including street drugs/illegal medications). Failure to inform my anesthesiologist about these medicines may increase my risk of anesthetic complications.  If I am allergic to anything or have had a reaction to anesthesia before.  I understand how the anesthesia medicine will help me (benefits).  I understand that with any type of anesthesia medicine there are risks:  The most common risks are: nausea, vomiting, sore throat, muscle soreness, damage to my eyes, mouth, or teeth (from breathing tube placement).  Rare risks include: remembering what happened during my procedure, allergic reactions to medications, injury to my airway, heart, lungs, vision, nerves, or muscles and in extremely rare  instances death.  My doctor has explained to me other choices available to me for my care (alternatives).  Pregnant Patients (“epidural”):  I understand that the risks of having an epidural (medicine given into my back to help control pain during labor), include itching, low blood pressure, difficulty urinating, headache or slowing of the baby’s heart. Very rare risks include infection, bleeding, seizure, irregular heart rhythms and nerve injury.  Regional Anesthesia (“spinal”, “epidural”, & “nerve blocks”):  I understand that rare but potential complications include headache, bleeding, infection, seizure, irregular heart rhythms, and nerve injury.    I can change my mind about having anesthesia services at any time before I get the medicine.    _____________________________________________________________________________  Patient (or Representative) Signature/Relationship to Patient  Date   Time    _____________________________________________________________________________   Name (if used)    Language/Organization   Time    _____________________________________________________________________________  Anesthesiologist Signature     Date   Time  I have discussed the procedure and information above with the patient (or patient’s representative) and answered their questions. The patient or their representative has agreed to have anesthesia services.    _____________________________________________________________________________  Witness        Date   Time  I have verified that the signature is that of the patient or patient’s representative, and that it was signed before the procedure  Patient Name: Mateo Byrd     : 1970                 Printed: 2024     Medical Record #: [id number]                     Page 3 of 3

## (undated) NOTE — ED AVS SNAPSHOT
Maryse Lackey   MRN: [id number]    Department:  [facility] Emergency Department   Date of Visit:  12/1/2019           Disclosure     Insurance plans vary and the physician(s) referred by the ER may not be covered by your plan.  Please contact tell this physician (or your personal doctor if your instructions are to return to your personal doctor) about any new or lasting problems. The primary care or specialist physician will see patients referred from the [facility] Emergency Department.  Marce Noyola